# Patient Record
Sex: MALE | Race: WHITE | NOT HISPANIC OR LATINO | Employment: OTHER | URBAN - METROPOLITAN AREA
[De-identification: names, ages, dates, MRNs, and addresses within clinical notes are randomized per-mention and may not be internally consistent; named-entity substitution may affect disease eponyms.]

---

## 2017-01-19 ENCOUNTER — LAB REQUISITION (OUTPATIENT)
Dept: LAB | Facility: HOSPITAL | Age: 72
End: 2017-01-19
Payer: MEDICARE

## 2017-01-19 ENCOUNTER — ALLSCRIPTS OFFICE VISIT (OUTPATIENT)
Dept: OTHER | Facility: OTHER | Age: 72
End: 2017-01-19

## 2017-01-19 DIAGNOSIS — L98.9 DISORDER OF SKIN OR SUBCUTANEOUS TISSUE: ICD-10-CM

## 2017-01-19 PROCEDURE — 88305 TISSUE EXAM BY PATHOLOGIST: CPT | Performed by: DERMATOLOGY

## 2017-01-30 ENCOUNTER — GENERIC CONVERSION - ENCOUNTER (OUTPATIENT)
Dept: OTHER | Facility: OTHER | Age: 72
End: 2017-01-30

## 2017-02-20 ENCOUNTER — ALLSCRIPTS OFFICE VISIT (OUTPATIENT)
Dept: OTHER | Facility: OTHER | Age: 72
End: 2017-02-20

## 2017-03-21 ENCOUNTER — ALLSCRIPTS OFFICE VISIT (OUTPATIENT)
Dept: OTHER | Facility: OTHER | Age: 72
End: 2017-03-21

## 2017-07-27 ENCOUNTER — ALLSCRIPTS OFFICE VISIT (OUTPATIENT)
Dept: OTHER | Facility: OTHER | Age: 72
End: 2017-07-27

## 2017-07-27 PROCEDURE — 88305 TISSUE EXAM BY PATHOLOGIST: CPT | Performed by: DERMATOLOGY

## 2017-07-28 ENCOUNTER — LAB REQUISITION (OUTPATIENT)
Dept: LAB | Facility: HOSPITAL | Age: 72
End: 2017-07-28
Payer: MEDICARE

## 2017-07-28 DIAGNOSIS — L98.9 DISORDER OF SKIN OR SUBCUTANEOUS TISSUE: ICD-10-CM

## 2017-08-11 ENCOUNTER — GENERIC CONVERSION - ENCOUNTER (OUTPATIENT)
Dept: OTHER | Facility: OTHER | Age: 72
End: 2017-08-11

## 2017-10-04 ENCOUNTER — LAB REQUISITION (OUTPATIENT)
Dept: LAB | Facility: HOSPITAL | Age: 72
End: 2017-10-04
Payer: MEDICARE

## 2017-10-04 DIAGNOSIS — C44.619 BASAL CELL CARCINOMA OF SKIN OF LEFT UPPER LIMB, INCLUDING SHOULDER: ICD-10-CM

## 2017-10-04 PROCEDURE — 88305 TISSUE EXAM BY PATHOLOGIST: CPT | Performed by: DERMATOLOGY

## 2017-10-13 ENCOUNTER — GENERIC CONVERSION - ENCOUNTER (OUTPATIENT)
Dept: OTHER | Facility: OTHER | Age: 72
End: 2017-10-13

## 2017-10-16 PROCEDURE — 88305 TISSUE EXAM BY PATHOLOGIST: CPT | Performed by: DERMATOLOGY

## 2017-10-17 ENCOUNTER — LAB REQUISITION (OUTPATIENT)
Dept: LAB | Facility: HOSPITAL | Age: 72
End: 2017-10-17
Payer: MEDICARE

## 2017-10-17 DIAGNOSIS — L98.9 DISORDER OF SKIN OR SUBCUTANEOUS TISSUE: ICD-10-CM

## 2017-10-25 ENCOUNTER — GENERIC CONVERSION - ENCOUNTER (OUTPATIENT)
Dept: OTHER | Facility: OTHER | Age: 72
End: 2017-10-25

## 2017-11-14 ENCOUNTER — LAB REQUISITION (OUTPATIENT)
Dept: LAB | Facility: HOSPITAL | Age: 72
End: 2017-11-14
Payer: MEDICARE

## 2017-11-14 DIAGNOSIS — C44.629 SQUAMOUS CELL CARCINOMA OF SKIN OF LEFT UPPER LIMB, INCLUDING SHOULDER: ICD-10-CM

## 2017-11-14 PROCEDURE — 88305 TISSUE EXAM BY PATHOLOGIST: CPT | Performed by: DERMATOLOGY

## 2017-11-21 ENCOUNTER — GENERIC CONVERSION - ENCOUNTER (OUTPATIENT)
Dept: OTHER | Facility: OTHER | Age: 72
End: 2017-11-21

## 2017-11-28 ENCOUNTER — LAB REQUISITION (OUTPATIENT)
Dept: LAB | Facility: HOSPITAL | Age: 72
End: 2017-11-28
Payer: MEDICARE

## 2017-11-28 DIAGNOSIS — L98.9 DISORDER OF SKIN OR SUBCUTANEOUS TISSUE: ICD-10-CM

## 2017-11-28 PROCEDURE — 88305 TISSUE EXAM BY PATHOLOGIST: CPT | Performed by: DERMATOLOGY

## 2017-12-05 ENCOUNTER — GENERIC CONVERSION - ENCOUNTER (OUTPATIENT)
Dept: OTHER | Facility: OTHER | Age: 72
End: 2017-12-05

## 2018-01-12 NOTE — RESULT NOTES
Message   appt made     Verified Results  (1) TISSUE EXAM [id number] 03:37PM Vinita Mitchell Order Number: [id number]     Test Name Result Flag Reference   LAB AP CASE REPORT (Report)     Surgical Pathology Report             Case: [id number]                   Authorizing Provider: Zachary Shaffer MD     Collected:      01/19/2017 1537        Pathologist:      Jerry Stuart MD      Received:      01/23/2017 1152        Specimens:  A) - Skin, Other, Upper back                                      B) - Skin, Other, Lower back   LAB AP FINAL DIAGNOSIS (Report)     A  Skin, Upper back, shave biopsy:  - At least hypertrophic actinic keratosis with focal proliferative and   acantholytic features  See Note  -- Entire base of lesion not visualized; underlying malignancy cannot be   excluded  B  Skin, Lower back, shave biopsy:  - Basal cell carcinoma, nodular type, extending to the peripheral border   and base of biopsy  Interpretation performed at [place] [address]  Electronically signed by Jerry Stuart MD on 1/26/2017 at 11:40 AM   LAB AP NOTE (Report)     Seen only on the initial level in specimen A, there are focal   proliferative features incompletely visualized at the base of the biopsy  Early superficially invasive squamous cell carcinoma arising in a   hypertrophic actinic keratosis cannot be completely excluded  If lesion   remains or recurs, consideration for excision to ensure complete removal   of the lesion is recommended  LAB AP SURGICAL ADDITIONAL INFORMATION (Report)     These tests were developed and their performance characteristics   determined by [facility] or [employer]  They may not be cleared or approved by the [place] S  Food and   Drug Administration  The FDA has determined that such clearance or   approval is not necessary  These tests are used for clinical purposes     They should not be regarded as investigational or for research  This   laboratory has been approved by [place] [id number], designated as a high-complexity   laboratory and is qualified to perform these tests  LAB AP GROSS DESCRIPTION (Report)     A  The specimen is received in formalin, labeled with the patient's name   and hospital number, and is designated skin shave upper back  The   specimen consists of a tan superficial shave of skin measuring 0 7 x 0 4 x   0 1 cm  The skin surface exhibits a tan-yellow keratotic papule measuring   0 6 x 0 3 x 0 2 cm  The margin is inked blue  The skin surface is inked   red  The specimen is bisected lengthwise  Entirely submitted  One   cassette  B  The specimen is received in formalin, labeled with the patient's name   and hospital number, and is designated skin shave of lower back  The   specimen consists of a tan superficial shave of skin measuring 0 8 x 0 6 x   0 1 cm  The skin surface appears keratotic  The margin is inked blue  The   skin surface is inked red  The specimen is bisected lengthwise  Entirely   submitted  One cassette  Note: The estimated total formalin fixation time based upon information   provided by the submitting clinician and the standard processing schedule   is over 72 hours  MAC   LAB AP CLINICAL INFORMATION      TW Order Number: [id number]  A   R/O SCC  B  R/O BCC

## 2018-01-13 NOTE — RESULT NOTES
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,    extending to deep reticular dermis (2 mm deep, Zachary's level IV)  - No perineural or lymph-vascular invasion identified   - Scar and changes consistent with prior biopsy site  - Resection margins (peripheral and deep) negative for malignancy  - 7th Ed  AJCC Stage- at least Stage I- pT1, pNX    - Adjacent/associated hypertrophic actinic keratosis  Interpretation performed at [facility], [address] [place] [address]  Electronically signed by Kd Ang MD on 11/20/2017 at 8:24 PM   LAB AP SURGICAL ADDITIONAL INFORMATION (Report)     All controls performed with the immunohistochemical stains reported above   reacted appropriately  These tests were developed and their performance   characteristics determined by [address] [place] [address] or   [facility]  They may not be cleared or approved by the U S  Food and Drug Administration  The FDA has determined that such clearance   or approval is not necessary  These tests are used for clinical purposes  They should not be regarded as investigational or for research  This   laboratory has been approved by [place] [id number], designated as a high-complexity   laboratory and is qualified to perform these tests  LAB AP GROSS DESCRIPTION (Report)     A   The specimen is received in formalin, labeled with the patient's name   and hospital number, and is designated left elbow, is an unoriented   elliptical resection of skin with a minimal amount of attached fat   measuring 2 8 x 1 3 cm and is excised to a depth of 0 2 cm  The epidermal   surface is tan, wrinkled, and contains a 0 9 x 0 6 cm tan-white, firm, and   ill-defined nodule that is 0 3 cm away from the nearest resection margin   which is inked green  The specimen is serially sectioned parallel to the   short axis revealing tan cut surfaces  The specimen is entirely submitted   with the tips in cassette A1 and the remaining tissue in cassettes A2-A5  A3-A4 contain lesion  Each cassette contains two pieces  Note: The estimated total formalin fixation time based upon information   provided by the submitting clinician and the standard processing schedule   is less than 72 hours   Layla CORLEY AP CLINICAL INFORMATION      TW Order Number: [id number]  [facility] check margins

## 2018-01-16 NOTE — RESULT NOTES
Verified Results  (1) TISSUE EXAM [id number] 11:54AM Mame Knight Order Number: [id number]     Test Name Result Flag Reference   LAB AP CASE REPORT (Report)     Surgical Pathology Report             Case: [id number]                   Authorizing Provider: Javier Madden MD     Collected:      10/16/2017           Pathologist:      Tomás Mahan MD      Received:      10/18/2017 1313        Specimens:  A) - Skin, Other, Left elbow                                      B) - Skin, Other, Right forearm   LAB AP FINAL DIAGNOSIS (Report)     A  Skin, Left elbow, shave biopsy:  - Invasive squamous cell carcinoma, well to moderately differentiated,   present at    the peripheral border and base of biopsy  B  Skin, Right forearm, shave biopsy:  - 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 and   foci    suspicious for invasion, extending to base of biopsy  -- Entire base of lesion not visualized  Interpretation performed at [place] [address]  Electronically signed by Tomás Mahan MD on 10/20/2017 at 11:54 AM   LAB AP SURGICAL ADDITIONAL INFORMATION (Report)     All controls performed with the immunohistochemical stains reported above   reacted appropriately  These tests were developed and their performance   characteristics determined by [facility] or   [facility] [employer]  They may not be cleared or approved by the [place] S  Food and Drug Administration  The FDA has determined that such clearance   or approval is not necessary  These tests are used for clinical purposes  They should not be regarded as investigational or for research  This   laboratory has been approved by [place] [id number], designated as a high-complexity   laboratory and is qualified to perform these tests  LAB AP GROSS DESCRIPTION (Report)     A   The specimen is received in formalin, labeled with the patient's name   and hospital number, and is designated skin shave left elbow  The   specimen consists of a tan superficial shave of skin measuring 0 8 x 0 6 x   0 1 cm  The skin surface is covered by tan to pale tan keratotic   tissue/material averaging 2 mm in thickness  The margin of resection is   inked green  The skin surface is inked red  The specimen is bisected   lengthwise  Entirely submitted  One cassette  B  The specimen is received in formalin, labeled with the patient's name   and hospital number, and is designated skin shave right forearm  The   specimen consists of a tan superficial shave of skin measuring 0 8 x 0 6 x   0 1 cm  The skin surface exhibits a tan keratotic papule measuring 0 5 x   0 3 x 0 2 cm  The margin of resection is inked green  The skin surface is   inked red  The specimen is bisected lengthwise  Entirely submitted  One   cassette  Note: The estimated total formalin fixation time based upon information   provided by the submitting clinician and the standard processing schedule   is less than 72 hours   MAC   LAB AP CLINICAL INFORMATION      TW Order Number: [id number]  SCC suspected R/O

## 2018-01-23 NOTE — RESULT NOTES
Verified Results  (1) TISSUE EXAM [id number] 03:48PM Juana Martines Order Number: [id number]     Test Name Result Flag Reference   LAB AP CASE REPORT (Report)     Surgical Pathology Report             Case: [id number]                   Authorizing Provider: Keith Hatch MD     Collected:      11/28/2017 1548        Pathologist:      Glen Campos MD      Received:      11/30/2017 0935        Specimen:  Skin, Other, Left cheek   LAB AP FINAL DIAGNOSIS (Report)     A  Skin, Left cheek, shave biopsy:  - Predominant hypertrophic and proliferative actinic keratosis with   follicular extension    and few detached atypical squamous nests at the base of the biopsy,   cannot exclude   early/superficially invasive squamous cell carcinoma  Interpretation performed at [facility], [address] [place] [address]   [id number]  Electronically signed by Glen Campos MD on 12/5/2017 at 9:35 AM   LAB AP NOTE      Excision to ensure complete removal of the lesion is recommended  LAB AP SURGICAL ADDITIONAL INFORMATION (Report)     All controls performed with the immunohistochemical stains reported above   reacted appropriately  These tests were developed and their performance   characteristics determined by [facility] or   [address] [place] [address]  They may not be cleared or approved by the [place]  Food and Drug Administration  The FDA has determined that such clearance   or approval is not necessary  These tests are used for clinical purposes  They should not be regarded as investigational or for research  This   laboratory has been approved by [place] [id number], designated as a high-complexity   laboratory and is qualified to perform these tests  LAB AP GROSS DESCRIPTION (Report)     A  The specimen is received in formalin, labeled with the patient's name   and hospital number, and is designated left cheek  The specimen consists   of a white skin shave measuring 0 7 x 0 4 by less than 0 1 cm  The   epithelial surface exhibits a white papule measuring 0 1 x 0 1 cm which   abuts the nearest peripheral margin  Bisected and entirely submitted in   one cassette  Note: The estimated total formalin fixation time based upon information   provided by the submitting clinician and the standard processing schedule   is over 72 hours   [place]   LAB AP CLINICAL INFORMATION      TW Order Number: [id number]  R/O SCC

## 2018-03-14 ENCOUNTER — OFFICE VISIT (OUTPATIENT)
Dept: DERMATOLOGY | Facility: CLINIC | Age: 73
End: 2018-03-14
Payer: MEDICARE

## 2018-03-14 DIAGNOSIS — L57.0 ACTINIC KERATOSIS: Primary | ICD-10-CM

## 2018-03-14 DIAGNOSIS — Z85.828 HISTORY OF SKIN CANCER: ICD-10-CM

## 2018-03-14 DIAGNOSIS — L82.1 SEBORRHEIC KERATOSIS: ICD-10-CM

## 2018-03-14 DIAGNOSIS — Z13.89 SCREENING FOR SKIN CONDITION: ICD-10-CM

## 2018-03-14 DIAGNOSIS — L98.9 CHANGING SKIN LESION: ICD-10-CM

## 2018-03-14 PROCEDURE — 99213 OFFICE O/P EST LOW 20 MIN: CPT | Performed by: DERMATOLOGY

## 2018-03-14 PROCEDURE — 17003 DESTRUCT PREMALG LES 2-14: CPT | Performed by: DERMATOLOGY

## 2018-03-14 PROCEDURE — 88305 TISSUE EXAM BY PATHOLOGIST: CPT | Performed by: PATHOLOGY

## 2018-03-14 PROCEDURE — 17000 DESTRUCT PREMALG LESION: CPT | Performed by: DERMATOLOGY

## 2018-03-14 PROCEDURE — 11100 PR BIOPSY OF SKIN LESION: CPT | Performed by: DERMATOLOGY

## 2018-03-14 NOTE — PATIENT INSTRUCTIONS
Skin lesion probable recurrent basal cell carcinoma will plan on complete excision if positive  Actinic Keratosis:  Patient advised lesions are precancers  These should resolve with cryosurgery if not to let us know sun block recommended  Seborrheic keratosis patient reassured these are normal growths we acquire with age no treatment needed  History of skin cancer in no recurrence nothing else atypical sunblock recommended follow-up in 6 months  Screening for dermatologic disorders nothing else of concern noted on complete exam follow-up in 6 months  Wound care instructions given to patient  Treatment with Cryotherapy    The doctor has treated your skin with nitrogen, which is 320 degrees Fahrenheit below zero  He has given the treated area "frostbite "    Stinging should subside within a few hours  You can take Tylenol for pain, if needed  Over the next few days, it is normal if the area becomes reddened, a blood blister, or swollen with fluid  If the lesion treated was near the eye - you could get a swollen eye over the next few days  Do not panic! This is all temporary, and will resolve with time  There is no special treatment - just keep the area clean  Makeup and BandAids can be used, if preferred  When the area starts to dry up and peel off, using Vaseline can help healing  It usually takes up to a month for it to heal   Some lesions are recurrent and may require repeat treatments  If a lesion has not healed in one month, please don't hesitate to contact us  If you have any further questions that are not answered here, please call us  [phone]    Thank you for allowing us to care for you

## 2018-03-14 NOTE — PROGRESS NOTES
[address] [facility] DERMATOLOGY  [address] [place] [address]     MRN: [id number] : 1945  Encounter: [id number]  Patient Information: Felisa Adams  Chief complaint:  Skin cancer recheck as well as recheck of actinic keratosis biopsied in November    History of present illness:  26-year-old male with history of numerous skin cancers who had not seen since November  Patient had a lesion biopsied on the left cheek that showed an actinic keratosis was not able to come in because of the weather  Patient due for overall checkup as well  Past Medical History:   Diagnosis Date    Asthma      Past Surgical History:   Procedure Laterality Date    CATARACT EXTRACTION      CO REPAIR ING HERNIA,5+Y/O,REDUCIBL Left 2016    Procedure: INGUINAL HERNIA REPAIR ;  Surgeon: Jason Diaz MD;  Location: [facility];  Service: General     Social History   History   Alcohol Use No     History   Drug Use No     History   Smoking Status    Former Smoker   Smokeless Tobacco    Former User     No family history on file  Meds/Allergies   No Known Allergies    Meds:  Prior to Admission medications    Medication Sig Start Date End Date Taking? Authorizing Provider   ezetimibe (ZETIA) 10 mg tablet Take 10 mg by mouth daily  Yes Historical Provider, MD   fluticasone-salmeterol (ADVAIR) 250-50 mcg/dose inhaler Inhale 1 puff every 12 (twelve) hours  Yes Historical Provider, MD   rosuvastatin (CRESTOR) 10 MG tablet Take 10 mg by mouth daily     Yes Historical Provider, MD       Subjective:     Review of Systems:    General: negative for - chills, fatigue, fever,  weight gain or weight loss  Psychological: negative for - anxiety, behavioral disorder, concentration difficulties, decreased libido, depression, irritability, memory difficulties, mood swings, sleep disturbances or suicidal ideation  ENT: negative for - hearing difficulties , nasal congestion, nasal discharge, oral lesions, sinus pain, sneezing, sore throat  Allergy and Immunology: negative for - hives, insect bite sensitivity,  Hematological and Lymphatic: negative for - bleeding problems, blood clots,bruising, swollen lymph nodes  Endocrine: negative for - hair pattern changes, hot flashes, malaise/lethargy, mood swings, palpitations, polydipsia/polyuria, skin changes, temperature intolerance or unexpected weight change  Respiratory: negative for - cough, hemoptysis, orthopnea, shortness of breath, or wheezing  Cardiovascular: negative for - chest pain, dyspnea on exertion, edema,  Gastrointestinal: negative for - abdominal pain, nausea/vomiting  Genito-Urinary: negative for - dysuria, incontinence, irregular/heavy menses or urinary frequency/urgency  Musculoskeletal: negative for - gait disturbance, joint pain, joint stiffness, joint swelling, muscle pain, muscular weakness  Dermatological:  As in HPI  Neurological: negative for confusion, dizziness, headaches, impaired coordination/balance, memory loss, numbness/tingling, seizures, speech problems, tremors or weakness       Objective: There were no vitals taken for this visit      Physical Exam:    General Appearance:    Alert, cooperative, no distress   Head:    Normocephalic, without obvious abnormality, atraumatic           Skin:   A full skin exam was performed including scalp, head scalp, eyes, ears, nose, lips, neck, chest, axilla, abdomen, back, buttocks, bilateral upper extremities, bilateral lower extremities, hands, feet, fingers, toes, fingernails, and toenails 7 mm indurated keratotic pearly nodule noted on the mid back possibly at the site of the scar of the previous curettage scaling erythematous areas noted below normal keratotic papules with greasy stuck on appearance other lesions we had previously excised appear without recurrence nothing else atypical noted except overall dry skin     Physical Exam   Constitutional:       HENT:   Head: Shave Biopsy Procedure Note    Pre-operative Diagnosis: basal cell cancer    Plan:  1  Instructed to keep the wound dry and covered for 24 and clean thereafter  2  Warning signs of infection were reviewed  3  Recommended that the patient use OTC acetaminophen as needed for pain  4  Return  Pending results of biopsy(ies)    Locations:mid back    Indications: basal cell cancer    Anesthesia: Lidocaine 1% without epinephrine without added sodium bicarbonate    Procedure Details     Patient informed of the risks (including bleeding and infection) and benefits of the   procedure and Verbal informed consent obtained  The lesion and surrounding area were given a sterile prep using alcohol and draped in the usual sterile fashion  A Blue blade razor was used to obtain a specimen  Hemostasis achieved with aluminum chloride  Petrolatum and a sterile dressing applied  The specimen was sent for pathologic examination  The patient tolerated the procedure(s) well  Complications:  none  Cryotherapy Procedure Note    Pre-operative Diagnosis: actinic keratosis    Plan:  1  Instructed to keep the area dry and clean thereafter  Apply petrolatum if area gets crusty  2  Recommended that the patient use acetaminophen  as needed for pain  Locations: face arms    Indications: Destruction of actinic keratosis x 9    Patient informed of risks (permanent scarring, infection, light or dark discoloration, bleeding, infection, weakness, numbness and recurrence of the lesion) and benefits of the procedure and verbal informed consent obtained  The areas are treated with liquid nitrogen therapy, frozen until ice ball extended 2 mm beyond lesion, allowed to thaw, and treated again  The patient tolerated procedure well  The patient was instructed on post-op care, warned that there may be blister formation, redness and pain   Recommend OTC analgesia as needed for pain     Condition:  Stable    Complications:  none  Assessment:     1  Actinic keratosis     2  Seborrheic keratosis     3  Changing skin lesion     4  Screening for skin condition     5  History of skin cancer           Plan:    Skin lesion probable recurrent basal cell carcinoma will plan on complete excision if positive  Actinic Keratosis:  Patient advised lesions are precancers  These should resolve with cryosurgery if not to let us know sun block recommended  Seborrheic keratosis patient reassured these are normal growths we acquire with age no treatment needed  History of skin cancer in no recurrence nothing else atypical sunblock recommended follow-up in 6 months  Screening for dermatologic disorders nothing else of concern noted on complete exam follow-up in 6 months    Katelin Dominguez MD  3/14/2018,1:02 PM    Portions of the record may have been created with voice recognition software   Occasional wrong word or "sound a like" substitutions may have occurred due to the inherent limitations of voice recognition software   Read the chart carefully and recognize, using context, where substitutions have occurred

## 2018-04-09 ENCOUNTER — PROCEDURE VISIT (OUTPATIENT)
Dept: DERMATOLOGY | Facility: CLINIC | Age: 73
End: 2018-04-09
Payer: MEDICARE

## 2018-04-09 DIAGNOSIS — C44.529 SQUAMOUS CELL CARCINOMA OF BACK: Primary | ICD-10-CM

## 2018-04-09 PROCEDURE — 11602 EXC TR-EXT MAL+MARG 1.1-2 CM: CPT | Performed by: DERMATOLOGY

## 2018-04-09 PROCEDURE — 88305 TISSUE EXAM BY PATHOLOGIST: CPT | Performed by: PATHOLOGY

## 2018-04-09 PROCEDURE — 12032 INTMD RPR S/A/T/EXT 2.6-7.5: CPT | Performed by: DERMATOLOGY

## 2018-04-09 NOTE — PROGRESS NOTES
[address]ERMATOLOGY  [address]  [address] [place] [address]     MRN: [id number] : 1945  Encounter: [id number]  Patient Information: Mary Shafer    Subjective:     59-year-old male presents for excision of previously biopsied invasive squamous cell carcinoma mid     Objective: There were no vitals taken for this visit  Physical Exam:    General Appearance:    Alert, cooperative, no distress   Skin:    Previous area of biopsy noted     Procedure name: Excision with intermediate layered closure        Location:  Mid back  Diagnosis: Squamous cell carcinoma    Size of lesion: 7  mm    Margins:4  mm    Size + Margins: 15  mm    I explained the diagnosis to the patient and we recommend an excision of the lesion for diagnosis and/or treatment  Potential complications include, but are not limited to: scarring, bleeding, infection, incomplete removal, recurrence, and nerve damage  The risks, benefits, and alternatives were discussed with the patient in detail  The location of the tumor was identified, circled, and confirmed by the patient  The correct patient, site, and procedure were confirmed  Anesthesia: 1% xylocaine with 1:100,000 epinephrine 6 cc  The patient was brought into the room, prepped and draped sterilely in the usual manner and anesthesia was administered by local infiltration  A fusiform shape was drawn around the lesion, and the margins were incised to the level of the subcutaneous fat with a number 15cc Bard-Gwyn blade  The tissue was removed with sharp and blunt dissection  The lateral margins of the resulting defect were undermined with sharp and blunt dissection  Hemostasis was achieved with electrocautery  The deeper layers of the defect, including subcutaneous fat and fascia, had to be approximated to reduce tension on the suture line  Layered wound closure was performed    The wound was cleaned with saline, dried off, petroleum applied, and the wound was covered with a pressure dressing  The patient was given detailed verbal and written instructions on postoperative care  Suture for adipose/fascial/dermal layer closure: 4-0  vicryl 3 sutures interrupted    Suture used to approximate epidermis: 4-0  nylon  7sutures interrupted    Final wound length: 3 8 cm    Follow-up in: 2  weeks  Patient tolerated procedure well without complications  Assessment:     1  Squamous cell carcinoma of back           Plan:   Wound care instructions given to patient        Prior to Admission medications    Medication Sig Start Date End Date Taking? Authorizing Provider   ezetimibe (ZETIA) 10 mg tablet Take 10 mg by mouth daily  Historical Provider, MD   fluticasone-salmeterol (ADVAIR) 250-50 mcg/dose inhaler Inhale 1 puff every 12 (twelve) hours  Historical Provider, MD   rosuvastatin (CRESTOR) 10 MG tablet Take 10 mg by mouth daily  Historical Provider, MD     No Known Allergies    Cristy Bateman MD  4/9/2018,2:29 PM    Portions of the record may have been created with voice recognition software   Occasional wrong word or "sound a like" substitutions may have occurred due to the inherent limitations of voice recognition software   Read the chart carefully and recognize, using context, where substitutions have occurred

## 2018-04-23 ENCOUNTER — CLINICAL SUPPORT (OUTPATIENT)
Dept: DERMATOLOGY | Facility: CLINIC | Age: 73
End: 2018-04-23

## 2018-04-23 DIAGNOSIS — C44.529 SQUAMOUS CELL CARCINOMA OF BACK: Primary | ICD-10-CM

## 2018-04-23 PROCEDURE — 99024 POSTOP FOLLOW-UP VISIT: CPT | Performed by: DERMATOLOGY

## 2018-04-23 NOTE — PROGRESS NOTES
[address] [facility] [address] [facility] DERMATOLOGY  [address] [address]     MRN: [id number] : 1945  Encounter: [id number]  Patient Information: Ariana Marcelino    Subjective:     68-year-old male presents for follow-up for previous excised basal cell carcinoma mid back  Objective: There were no vitals taken for this visit  Physical Exam:    General Appearance:    Alert, cooperative, no distress   Skin:    Previous site of excision healing well  Procedure:  Suture removal  Site of excision:  Mid back  Wound was cleansed with saline  Wound is intact  Sutures removed  Steri-Strips applied  Biopsy revealed  Basal cell cancer margins clear     Assessment:     1  Squamous cell carcinoma of back           Plan:    previous site of excision healing well margins clear      Prior to Admission medications    Medication Sig Start Date End Date Taking? Authorizing Provider   ezetimibe (ZETIA) 10 mg tablet Take 10 mg by mouth daily  Historical Provider, MD   fluticasone-salmeterol (ADVAIR) 250-50 mcg/dose inhaler Inhale 1 puff every 12 (twelve) hours  Historical Provider, MD   rosuvastatin (CRESTOR) 10 MG tablet Take 10 mg by mouth daily  Historical Provider, MD     No Known Allergies    Jesusita Salomon MD  ,2:97 PM    Portions of the record may have been created with voice recognition software   Occasional wrong word or "sound a like" substitutions may have occurred due to the inherent limitations of voice recognition software   Read the chart carefully and recognize, using context, where substitutions have occurred

## 2018-04-23 NOTE — PATIENT INSTRUCTIONS
STERI-STRIPS (BUTTERFLY) POST SURGERY        Steri-Strips have been placed over your incision site to give added support  Please continue to bathe the area as usual     Eventually the Steri-Strips will begin to peel off on the ends  Snip the raised ends off with scissors and let the rest fall off on their own  If you have any questions, please call our office   [phone]

## 2018-08-14 ENCOUNTER — OFFICE VISIT (OUTPATIENT)
Dept: DERMATOLOGY | Facility: CLINIC | Age: 73
End: 2018-08-14
Payer: MEDICARE

## 2018-08-14 DIAGNOSIS — Z13.89 SCREENING FOR SKIN CONDITION: ICD-10-CM

## 2018-08-14 DIAGNOSIS — L82.1 VERRUCOUS KERATOSIS: Primary | ICD-10-CM

## 2018-08-14 DIAGNOSIS — L23.7 POISON IVY: ICD-10-CM

## 2018-08-14 DIAGNOSIS — Z85.828 HISTORY OF SKIN CANCER: ICD-10-CM

## 2018-08-14 DIAGNOSIS — L82.1 SEBORRHEIC KERATOSIS: ICD-10-CM

## 2018-08-14 PROCEDURE — 17110 DESTRUCTION B9 LES UP TO 14: CPT | Performed by: DERMATOLOGY

## 2018-08-14 PROCEDURE — 99213 OFFICE O/P EST LOW 20 MIN: CPT | Performed by: DERMATOLOGY

## 2018-08-14 RX ORDER — LANOLIN ALCOHOL/MO/W.PET/CERES
CREAM (GRAM) TOPICAL DAILY
COMMUNITY

## 2018-08-14 RX ORDER — MULTIVITAMIN
1 CAPSULE ORAL DAILY
COMMUNITY

## 2018-08-14 NOTE — PROGRESS NOTES
[address]  [address]  [place]  [phone]  [phone]     MRN: [id number] : 1945  Encounter: [id number]  Patient Information: Seth Mensah  Chief complaint:3 month checkup    History of present illness:  65-year-old male presents for overall skin check previous history numerous skin cancers complains of numerous growths on his skin also concerned regarding a rash that developed after doing yard work  Past Medical History:   Diagnosis Date    Asthma      Past Surgical History:   Procedure Laterality Date    CATARACT EXTRACTION      TN REPAIR ING HERNIA,5+Y/O,REDUCIBL Left 2016    Procedure: INGUINAL HERNIA REPAIR ;  Surgeon: Alexsandra Rendon MD;  [facility];  Service: General     Social History   History   Alcohol Use No     History   Drug Use No     History   Smoking Status    Former Smoker   Smokeless Tobacco    Former User     History reviewed  No pertinent family history  Meds/Allergies   No Known Allergies    Meds:  Prior to Admission medications    Medication Sig Start Date End Date Taking? Authorizing Provider   cyanocobalamin (VITAMIN B-12) 1,000 mcg tablet Take by mouth daily   Yes Historical Provider, MD   ezetimibe (ZETIA) 10 mg tablet Take 10 mg by mouth daily  Yes Historical Provider, MD   fluticasone-salmeterol (ADVAIR) 250-50 mcg/dose inhaler Inhale 1 puff every 12 (twelve) hours  Yes Historical Provider, MD   Multiple Vitamin (MULTIVITAMIN) capsule Take 1 capsule by mouth daily   Yes Historical Provider, MD   rosuvastatin (CRESTOR) 10 MG tablet Take 10 mg by mouth daily     Yes Historical Provider, MD       Subjective:     Review of Systems:    General: negative for - chills, fatigue, fever,  weight gain or weight loss  Psychological: negative for - anxiety, behavioral disorder, concentration difficulties, decreased libido, depression, irritability, memory difficulties, mood swings, sleep disturbances or suicidal ideation  ENT: negative for - hearing difficulties , nasal congestion, nasal discharge, oral lesions, sinus pain, sneezing, sore throat  Allergy and Immunology: negative for - hives, insect bite sensitivity,  Hematological and Lymphatic: negative for - bleeding problems, blood clots,bruising, swollen lymph nodes  Endocrine: negative for - hair pattern changes, hot flashes, malaise/lethargy, mood swings, palpitations, polydipsia/polyuria, skin changes, temperature intolerance or unexpected weight change  Respiratory: negative for - cough, hemoptysis, orthopnea, shortness of breath, or wheezing  Cardiovascular: negative for - chest pain, dyspnea on exertion, edema,  Gastrointestinal: negative for - abdominal pain, nausea/vomiting  Genito-Urinary: negative for - dysuria, incontinence, irregular/heavy menses or urinary frequency/urgency  Musculoskeletal: negative for - gait disturbance, joint pain, joint stiffness, joint swelling, muscle pain, muscular weakness  Dermatological:  As in HPI  Neurological: negative for confusion, dizziness, headaches, impaired coordination/balance, memory loss, numbness/tingling, seizures, speech problems, tremors or weakness       Objective: There were no vitals taken for this visit      Physical Exam:    General Appearance:    Alert, cooperative, no distress   Head:    Normocephalic, without obvious abnormality, atraumatic           Skin:   A full skin exam was performed including scalp, head scalp, eyes, ears, nose, lips, neck, chest, axilla, abdomen, back, buttocks, bilateral upper extremities, bilateral lower extremities, hands, feet, fingers, toes, fingernails, and toenails  Verrucous keratotic papules noted on the dorsum of his hands upper arms and right shin no mole keratotic papules with greasy stuck on appearance previous sites of skin cancer well healed without recurrence erythema scaling well-demarcated patches noted on the right arm streaking Cryotherapy Procedure Note    Pre-operative Diagnosis:  Verrucous keratosis    Plan:  1  Instructed to keep the area dry and clean thereafter  Apply petrolatum if area gets crusty  2  Recommended that the patient use acetaminophen  as needed for pain  Locations:  Arms dorsum of the  Hands and right shin    Indications: Destruction of  Verrucous keratosis x6    Patient informed of risks (permanent scarring, infection, light or dark discoloration, bleeding, infection, weakness, numbness and recurrence of the lesion) and benefits of the procedure and verbal informed consent obtained  The areas are treated with liquid nitrogen therapy, frozen until ice ball extended 2 mm beyond lesion, allowed to thaw, and treated again  The patient tolerated procedure well  The patient was instructed on post-op care, warned that there may be blister formation, redness and pain  Recommend OTC analgesia as needed for pain  Condition:  Stable    Complications:  none  Assessment:     1  Verrucous keratosis     2  Screening for skin condition     3  History of skin cancer     4  Poison ivy     5   Seborrheic keratosis           Plan:    verrucous keratosis lesion treated because the patient concern and irritation   poison ivy will go ahead treat with topical steroids follow-up if any worsening noted  Seborrheic keratosis patient reassured these are normal growths we acquire with age no treatment needed  History of skin cancer in no recurrence nothing else atypical sunblock recommended follow-up in 3 months  Screening for dermatologic disorders nothing else of concern noted on complete exam follow-up in 3 months    Sumaya Britt MD  8/14/2018,2:20 PM    Portions of the record may have been created with voice recognition software   Occasional wrong word or "sound a like" substitutions may have occurred due to the inherent limitations of voice recognition software   Read the chart carefully and recognize, using context, where substitutions have occurred

## 2018-08-14 NOTE — PATIENT INSTRUCTIONS
verrucous keratosis lesion treated because the patient concern and irritation   poison ivy will go ahead treat with topical steroids follow-up if any worsening noted  Seborrheic keratosis patient reassured these are normal growths we acquire with age no treatment needed  History of skin cancer in no recurrence nothing else atypical sunblock recommended follow-up in 3 months  Screening for dermatologic disorders nothing else of concern noted on complete exam follow-up in 3 months  Treatment with Cryotherapy    The doctor has treated your skin with nitrogen, which is 320 degrees Fahrenheit below zero  He has given the treated area "frostbite "    Stinging should subside within a few hours  You can take Tylenol for pain, if needed  Over the next few days, it is normal if the area becomes reddened, a blood blister, or swollen with fluid  If the lesion treated was near the eye - you could get a swollen eye over the next few days  Do not panic! This is all temporary, and will resolve with time  There is no special treatment - just keep the area clean  Makeup and BandAids can be used, if preferred  When the area starts to dry up and peel off, using Vaseline can help healing  It usually takes up to a month for it to heal   Some lesions are recurrent and may require repeat treatments  If a lesion has not healed in one month, please don't hesitate to contact us  If you have any further questions that are not answered here, please call us  [phone]    Thank you for allowing us to care for you

## 2019-05-17 ENCOUNTER — OFFICE VISIT (OUTPATIENT)
Dept: DERMATOLOGY | Facility: CLINIC | Age: 74
End: 2019-05-17
Payer: MEDICARE

## 2019-05-17 DIAGNOSIS — Z13.89 SCREENING FOR SKIN CONDITION: ICD-10-CM

## 2019-05-17 DIAGNOSIS — L98.9 UNKNOWN SKIN LESION: ICD-10-CM

## 2019-05-17 DIAGNOSIS — L82.1 SEBORRHEIC KERATOSIS: Primary | ICD-10-CM

## 2019-05-17 DIAGNOSIS — Z85.828 HISTORY OF SKIN CANCER: ICD-10-CM

## 2019-05-17 PROCEDURE — 88305 TISSUE EXAM BY PATHOLOGIST: CPT | Performed by: PATHOLOGY

## 2019-05-17 PROCEDURE — 99213 OFFICE O/P EST LOW 20 MIN: CPT | Performed by: DERMATOLOGY

## 2019-05-17 PROCEDURE — 11102 TANGNTL BX SKIN SINGLE LES: CPT | Performed by: DERMATOLOGY

## 2019-05-17 PROCEDURE — 11103 TANGNTL BX SKIN EA SEP/ADDL: CPT | Performed by: DERMATOLOGY

## 2019-05-22 ENCOUNTER — TELEPHONE (OUTPATIENT)
Dept: DERMATOLOGY | Facility: CLINIC | Age: 74
End: 2019-05-22

## 2019-06-12 ENCOUNTER — TELEPHONE (OUTPATIENT)
Dept: DERMATOLOGY | Facility: CLINIC | Age: 74
End: 2019-06-12

## 2019-06-19 ENCOUNTER — PROCEDURE VISIT (OUTPATIENT)
Dept: DERMATOLOGY | Facility: CLINIC | Age: 74
End: 2019-06-19
Payer: MEDICARE

## 2019-06-19 DIAGNOSIS — C44.629 SQUAMOUS CELL CANCER OF SKIN OF LEFT FOREARM: Primary | ICD-10-CM

## 2019-06-19 DIAGNOSIS — C44.619 BASAL CELL CARCINOMA OF LEFT FOREARM: ICD-10-CM

## 2019-06-19 PROCEDURE — 11602 EXC TR-EXT MAL+MARG 1.1-2 CM: CPT | Performed by: DERMATOLOGY

## 2019-06-19 PROCEDURE — 12034 INTMD RPR S/TR/EXT 7.6-12.5: CPT | Performed by: DERMATOLOGY

## 2019-06-19 PROCEDURE — 88305 TISSUE EXAM BY PATHOLOGIST: CPT | Performed by: PATHOLOGY

## 2019-06-19 PROCEDURE — 11603 EXC TR-EXT MAL+MARG 2.1-3 CM: CPT | Performed by: DERMATOLOGY

## 2019-06-28 ENCOUNTER — OFFICE VISIT (OUTPATIENT)
Dept: DERMATOLOGY | Facility: CLINIC | Age: 74
End: 2019-06-28

## 2019-06-28 DIAGNOSIS — D04.62 SQUAMOUS CELL CARCINOMA IN SITU (SCCIS) OF SKIN OF LEFT FOREARM: ICD-10-CM

## 2019-06-28 DIAGNOSIS — C44.619 BASAL CELL CARCINOMA (BCC) OF SKIN OF LEFT WRIST: Primary | ICD-10-CM

## 2019-06-28 PROCEDURE — 99024 POSTOP FOLLOW-UP VISIT: CPT | Performed by: DERMATOLOGY

## 2019-07-01 ENCOUNTER — TELEPHONE (OUTPATIENT)
Dept: DERMATOLOGY | Facility: CLINIC | Age: 74
End: 2019-07-01

## 2019-07-02 ENCOUNTER — TELEPHONE (OUTPATIENT)
Dept: DERMATOLOGY | Facility: CLINIC | Age: 74
End: 2019-07-02

## 2019-07-02 ENCOUNTER — PROCEDURE VISIT (OUTPATIENT)
Dept: DERMATOLOGY | Facility: CLINIC | Age: 74
End: 2019-07-02
Payer: MEDICARE

## 2019-07-02 DIAGNOSIS — C44.722 SQUAMOUS CELL CARCINOMA OF RIGHT LOWER LEG: ICD-10-CM

## 2019-07-02 DIAGNOSIS — D04.62 SQUAMOUS CELL CARCINOMA IN SITU (SCCIS) OF SKIN OF LEFT FOREARM: Primary | ICD-10-CM

## 2019-07-02 PROCEDURE — 17260 DSTRJ MAL LES T/A/L 0.5 CM/<: CPT | Performed by: DERMATOLOGY

## 2019-07-02 RX ORDER — SODIUM FLUORIDE 6.1 MG/ML
GEL, DENTIFRICE DENTAL
Refills: 6 | COMMUNITY
Start: 2019-05-15

## 2019-07-02 NOTE — PROGRESS NOTES
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 and minimally invasive squamous cell carcinoma right shin lesions appear non indurated and minimal insert     Objective: There were no vitals taken for this visit  Physical Exam:    General Appearance:    Alert, cooperative, no distress   Skin:   Previous sites of biopsy noted  Procedure: Curettage & Electrodessication of squamous cell carcinoma in situ/squamous cell carcinoma minimally invasive  The reasons for the procedure were explained to the patient  The benefits and risks of the procedure were explained to the patient, including bleeding, infection, incomplete removal, prolonged anesthesia (weeks to months) and rarely nerve damage  The patient is aware that a scar will result from the procedure  The consent for the procedure was obtained verbally and in writing  Lesion Site:  Left forearm Curetted Area Size (mm): 4                       Right shin                                                4  Using a sterile curette, the appropriate area was curetted  The area was curetted and electrodesiccated x3  Final defect size: 5/5 respectively    The wound was left to heal by secondary intention  The wound was cleansed then covered with a dressing  Wound care instructions were verbally given and in writing  I performed the entire procedure  Patient tolerated procedure well  Assessment:     1  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 Authorizing Provider   cyanocobalamin (VITAMIN B-12) 1,000 mcg tablet Take by mouth daily   Yes Historical Provider, MD   ezetimibe (ZETIA) 10 mg tablet Take 10 mg by mouth daily  Yes Historical Provider, MD   fluticasone-salmeterol (ADVAIR) 250-50 mcg/dose inhaler Inhale 1 puff every 12 (twelve) hours  Yes Historical Provider, MD   Multiple Vitamin (MULTIVITAMIN) capsule Take 1 capsule by mouth daily   Yes Historical Provider, MD   PREVIDENT 5000 DRY MOUTH 1 1 % GEL BRUSH IN PLACE OF YOUR REGULAR TOOTHPASTE AT NIGHT  5/15/19  Yes Historical Provider, MD   rosuvastatin (CRESTOR) 10 MG tablet Take 10 mg by mouth daily  Yes Historical Provider, MD   fluocinonide (LIDEX) 0 05 % cream Apply topically 2 (two) times a day To poison ivy until clear  Patient not taking: Reported on 7/2/2019 8/14/18   Karena Harper MD     No Known Allergies    Karena Harper MD  7/2/2019,2:33 PM    Portions of the record may have been created with voice recognition software   Occasional wrong word or "sound a like" substitutions may have occurred due to the inherent limitations of voice recognition software   Read the chart carefully and recognize, using context, where substitutions have occurred

## 2019-07-02 NOTE — PATIENT INSTRUCTIONS
Wound care instructions given to patient  Patient also will be going for Mohs surgery for the previously excised basal cell left wrist with margin involvement  Wound care instructions given to patient

## 2019-07-02 NOTE — TELEPHONE ENCOUNTER
Patient came in for a procedure today    I gave them the information about the Mohs surgery scheduled for July 23, 2019 at 9:00 am

## 2019-07-16 ENCOUNTER — TELEPHONE (OUTPATIENT)
Dept: DERMATOLOGY | Facility: CLINIC | Age: 74
End: 2019-07-16

## 2019-07-16 ENCOUNTER — OFFICE VISIT (OUTPATIENT)
Dept: DERMATOLOGY | Facility: CLINIC | Age: 74
End: 2019-07-16
Payer: MEDICARE

## 2019-07-16 DIAGNOSIS — C44.722 SQUAMOUS CELL CARCINOMA OF RIGHT LOWER LEG: ICD-10-CM

## 2019-07-16 DIAGNOSIS — C44.629 SQUAMOUS CELL CANCER OF SKIN OF LEFT FOREARM: ICD-10-CM

## 2019-07-16 DIAGNOSIS — L23.7 POISON IVY: Primary | ICD-10-CM

## 2019-07-16 PROCEDURE — 99212 OFFICE O/P EST SF 10 MIN: CPT | Performed by: DERMATOLOGY

## 2019-07-16 NOTE — PROGRESS NOTES
[address] [facility] DERMATOLOGY  [address] [place] [address]  [phone]  [phone]     MRN: [id number] : 1945  Encounter: [id number]  Patient Information: Sharif Mckinney  Chief complaint:  Patient presents for recheck of his previously curetted areas and  Poison ivy  History of present illness:  41-year-old male presents for concerns regarding previously curetted area on his left upper arm and right shin wife was concerned about potential nonhealing correctly   Also patient is concerned developed some poison ivy after pulling some weeds  Past Medical History:   Diagnosis Date    Asthma     Malignant neoplasm of skin     Nonmelanoma skin cancer     Last assessed 2017      Past Surgical History:   Procedure Laterality Date    CATARACT EXTRACTION      MI REPAIR ING HERNIA,5+Y/O,REDUCIBL Left 2016    Procedure: INGUINAL HERNIA REPAIR ;  Surgeon: Rob Patel MD;  [facility];  Service: General     Social History   Social History     Substance and Sexual Activity   Alcohol Use No     Social History     Substance and Sexual Activity   Drug Use No     Social History     Tobacco Use   Smoking Status Former Smoker   Smokeless Tobacco Former User     No family history on file  Meds/Allergies   No Known Allergies    Meds:  Prior to Admission medications    Medication Sig Start Date End Date Taking? Authorizing Provider   cyanocobalamin (VITAMIN B-12) 1,000 mcg tablet Take by mouth daily    Historical Provider, MD   ezetimibe (ZETIA) 10 mg tablet Take 10 mg by mouth daily  Historical Provider, MD   fluocinonide (LIDEX) 0 05 % cream Apply topically 2 (two) times a day To poison ivy until clear  Patient not taking: Reported on 2019   Leatha Martel MD   fluticasone-salmeterol (ADVAIR) 250-50 mcg/dose inhaler Inhale 1 puff every 12 (twelve) hours      Historical Provider, MD   Multiple Vitamin (MULTIVITAMIN) capsule Take 1 capsule by mouth daily    Historical Provider, MD   PREVIDENT 5000 DRY MOUTH 1 1 % GEL BRUSH IN PLACE OF YOUR REGULAR TOOTHPASTE AT NIGHT  5/15/19   Historical Provider, MD   rosuvastatin (CRESTOR) 10 MG tablet Take 10 mg by mouth daily  Historical Provider, MD       Subjective:     Review of Systems:    General: negative for - chills, fatigue, fever,  weight gain or weight loss  Psychological: negative for - anxiety, behavioral disorder, concentration difficulties, decreased libido, depression, irritability, memory difficulties, mood swings, sleep disturbances or suicidal ideation  ENT: negative for - hearing difficulties , nasal congestion, nasal discharge, oral lesions, sinus pain, sneezing, sore throat  Allergy and Immunology: negative for - hives, insect bite sensitivity,  Hematological and Lymphatic: negative for - bleeding problems, blood clots,bruising, swollen lymph nodes  Endocrine: negative for - hair pattern changes, hot flashes, malaise/lethargy, mood swings, palpitations, polydipsia/polyuria, skin changes, temperature intolerance or unexpected weight change  Respiratory: negative for - cough, hemoptysis, orthopnea, shortness of breath, or wheezing  Cardiovascular: negative for - chest pain, dyspnea on exertion, edema,  Gastrointestinal: negative for - abdominal pain, nausea/vomiting  Genito-Urinary: negative for - dysuria, incontinence, irregular/heavy menses or urinary frequency/urgency  Musculoskeletal: negative for - gait disturbance, joint pain, joint stiffness, joint swelling, muscle pain, muscular weakness  Dermatological:  As in HPI  Neurological: negative for confusion, dizziness, headaches, impaired coordination/balance, memory loss, numbness/tingling, seizures, speech problems, tremors or weakness       Objective: There were no vitals taken for this visit      Physical Exam:    General Appearance:    Alert, cooperative, no distress   Head:    Normocephalic, without obvious abnormality, atraumatic Skin:   A full skin exam was performed including scalp, head scalp, eyes, ears, nose, lips, neck, chest, axilla, abdomen, back, buttocks, bilateral upper extremities, bilateral lower extremities, hands, feet, fingers, toes, fingernails, and toenails previous site of curettage healing well without problems sign of infection scaling erythematous widespread areas of erythema scaling on the forearms and arms     Assessment:     1  Poison ivy     2  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     3  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

## 2019-07-16 NOTE — PATIENT INSTRUCTIONS
Previous sites of skin cancer healing well continue same treatment poison ivy will go ahead treat with topical steroids  Patient has of fluocinonide from previous eruption we also will  Plan  follow-up in a few weeks to see how things look

## 2019-07-29 ENCOUNTER — OFFICE VISIT (OUTPATIENT)
Dept: FAMILY MEDICINE CLINIC | Facility: MEDICAL CENTER | Age: 74
End: 2019-07-29
Payer: MEDICARE

## 2019-07-29 VITALS
HEIGHT: 63 IN | OXYGEN SATURATION: 98 % | HEART RATE: 72 BPM | DIASTOLIC BLOOD PRESSURE: 72 MMHG | WEIGHT: 122 LBS | BODY MASS INDEX: 21.62 KG/M2 | SYSTOLIC BLOOD PRESSURE: 110 MMHG

## 2019-07-29 DIAGNOSIS — Z13.1 SCREENING FOR DIABETES MELLITUS: ICD-10-CM

## 2019-07-29 DIAGNOSIS — Z12.5 SCREENING FOR PROSTATE CANCER: ICD-10-CM

## 2019-07-29 DIAGNOSIS — Z13.29 SCREENING FOR THYROID DISORDER: ICD-10-CM

## 2019-07-29 DIAGNOSIS — J45.40 MODERATE PERSISTENT ASTHMA WITHOUT COMPLICATION: Primary | ICD-10-CM

## 2019-07-29 DIAGNOSIS — E78.2 MIXED HYPERLIPIDEMIA: ICD-10-CM

## 2019-07-29 PROBLEM — Z85.828 HISTORY OF SKIN CANCER: Status: RESOLVED | Noted: 2018-03-14 | Resolved: 2019-07-29

## 2019-07-29 PROBLEM — L98.9 CHANGING SKIN LESION: Status: RESOLVED | Noted: 2017-01-19 | Resolved: 2019-07-29

## 2019-07-29 PROBLEM — L23.7 POISON IVY: Status: RESOLVED | Noted: 2018-08-14 | Resolved: 2019-07-29

## 2019-07-29 PROBLEM — Z13.89 SCREENING FOR SKIN CONDITION: Status: RESOLVED | Noted: 2018-03-14 | Resolved: 2019-07-29

## 2019-07-29 PROCEDURE — 99204 OFFICE O/P NEW MOD 45 MIN: CPT | Performed by: FAMILY MEDICINE

## 2019-07-29 NOTE — PROGRESS NOTES
Assessment/Plan:    No problem-specific Assessment & Plan notes found for this encounter  Diagnoses and all orders for this visit:    Moderate persistent asthma without complication  -     CBC and differential; Future    Mixed hyperlipidemia  -     Lipid Panel with Direct LDL reflex; Future  -     Comprehensive metabolic panel; Future    Screening for prostate cancer  -     PSA, Total Screen; Future    Screening for thyroid disorder  -     TSH, 3rd generation with Free T4 reflex; Future    Screening for diabetes mellitus  -     Comprehensive metabolic panel; Future          Subjective:      Patient ID: Renita Wan is a 75 y o  male  Retired , served in the [address] patient  Lives with wife  76 yo Exercises frequently    Past medical history, past surgical history, family medical history, social history, and medication list were all reviewed  The following portions of the patient's history were reviewed and updated as appropriate: allergies, current medications, past family history, past medical history, past social history, past surgical history and problem list     Review of Systems   Constitutional: Negative for activity change, fatigue and fever  HENT: Negative for congestion, ear discharge, ear pain, postnasal drip, rhinorrhea, sinus pain, sneezing and sore throat  Eyes: Negative for photophobia, pain, discharge and redness  Respiratory: Negative for apnea, cough, shortness of breath and wheezing  Cardiovascular: Negative for chest pain and palpitations  Gastrointestinal: Negative for abdominal pain, blood in stool, constipation, diarrhea, nausea and vomiting  Endocrine: Negative for polydipsia, polyphagia and polyuria  Genitourinary: Negative for decreased urine volume, difficulty urinating, discharge, dysuria, frequency, penile pain and urgency  Musculoskeletal: Negative for arthralgias, gait problem, joint swelling and neck pain     Skin: Negative for color change and rash  Neurological: Negative for dizziness, tremors, seizures, weakness and headaches  Psychiatric/Behavioral: Negative for agitation and sleep disturbance  The patient is not nervous/anxious  Objective:      /72 (BP Location: Left arm, Patient Position: Sitting, Cuff Size: Adult)   Pulse 72   Ht 5' 3" (1 6 m)   Wt 55 3 kg (122 lb)   SpO2 98%   BMI 21 61 kg/m²          Physical Exam   Constitutional: He is oriented to person, place, and time  Vital signs are normal  He appears well-developed and well-nourished  He is cooperative  HENT:   Head: Normocephalic  Right Ear: External ear normal    Left Ear: External ear normal    Nose: Nose normal    Mouth/Throat: Oropharynx is clear and moist    Eyes: Pupils are equal, round, and reactive to light  Conjunctivae, EOM and lids are normal    Neck: Normal range of motion  Neck supple  Carotid bruit is not present  No thyromegaly present  Cardiovascular: Normal rate, regular rhythm, S1 normal, S2 normal, normal heart sounds, intact distal pulses and normal pulses  No murmur heard  Pulmonary/Chest: Effort normal and breath sounds normal  No respiratory distress  He has no wheezes  He has no rales  Abdominal: Soft  Normal appearance and bowel sounds are normal  He exhibits no mass  There is no hepatosplenomegaly  There is no tenderness  Musculoskeletal: Normal range of motion  Lymphadenopathy:     He has no cervical adenopathy  Neurological: He is alert and oriented to person, place, and time  He has normal strength and normal reflexes  No cranial nerve deficit or sensory deficit  Skin: Skin is warm, dry and intact  No rash noted  No pallor  Psychiatric: He has a normal mood and affect  His behavior is normal  Judgment and thought content normal  Cognition and memory are normal    Nursing note and vitals reviewed

## 2019-07-30 ENCOUNTER — TELEPHONE (OUTPATIENT)
Dept: DERMATOLOGY | Facility: CLINIC | Age: 74
End: 2019-07-30

## 2019-07-31 NOTE — ASSESSMENT & PLAN NOTE
Patient currently uses Advair inhaler daily  He has very little to none use of rescue inhaler  Continue Advair, continue routine follow-up

## 2019-07-31 NOTE — ASSESSMENT & PLAN NOTE
Patient is taking rosuvastatin for hyperlipidemia  He is new patient for me  He is due for lipid profile and liver enzymes  Continue rosuvastatin, low-fat diet

## 2019-08-06 ENCOUNTER — OFFICE VISIT (OUTPATIENT)
Dept: DERMATOLOGY | Facility: CLINIC | Age: 74
End: 2019-08-06
Payer: MEDICARE

## 2019-08-06 DIAGNOSIS — C44.629 SQUAMOUS CELL CANCER OF SKIN OF LEFT FOREARM: ICD-10-CM

## 2019-08-06 DIAGNOSIS — L98.9 UNKNOWN SKIN LESION: Primary | ICD-10-CM

## 2019-08-06 DIAGNOSIS — C44.722 SQUAMOUS CELL CARCINOMA OF RIGHT LOWER LEG: ICD-10-CM

## 2019-08-06 PROCEDURE — 88305 TISSUE EXAM BY PATHOLOGIST: CPT | Performed by: PATHOLOGY

## 2019-08-06 PROCEDURE — 99212 OFFICE O/P EST SF 10 MIN: CPT | Performed by: DERMATOLOGY

## 2019-08-06 PROCEDURE — 11102 TANGNTL BX SKIN SINGLE LES: CPT | Performed by: DERMATOLOGY

## 2019-08-06 NOTE — PATIENT INSTRUCTIONS
Skin lesion of the looks unusual await results of biopsy to see if we get better delineate the process infiltrative malignancy    Squamous cell in situ of both the left forearm and right lower leg both appear to be healing well at present will monitor these closely for any recurrence in addition patient will be undergoing the Mohs surgery in a month for the left wrist   There are also numerous other actinic keratosis noted at present will hold off on intervention until lesion on the wrist is taking care

## 2019-08-06 NOTE — PROGRESS NOTES
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 as well as the 1 on the left forearm both areas appear to be healing  However he also concerned regarding lesion on the right upper arm that suddenly developed and has been itching patient denies any insect bites or any issue that has occurred  Also numerous other scaling areas noted on his forearms     Objective: There were no vitals taken for this visit  Physical Exam:    General Appearance:    Alert, cooperative, no distress   Skin:   Previous site of excision left wrist somewhat indurated area on the left forearm appears to be healing the several other scaling erythematous areas on both forearms indurated 6 mm nodule noted on the right upper arm and previous site of curettage right shin appears to be healed area still seem slightly indurated      Shave Biopsy Procedure Note    Pre-operative Diagnosis:  Rule out malignancy versus insect bite    Plan:  1  Instructed to keep the wound dry and covered for 24 and clean thereafter  2  Warning signs of infection were reviewed  3  Recommended that the patient use OTC acetaminophen as needed for pain  4  Return  Pending results of biopsy(ies)    Locations:  Right upper arm    Indications:  Itchy irritated new lesions    Anesthesia: Lidocaine 1% with epinephrine without added sodium bicarbonate    Procedure Details     Patient informed of the risks (including bleeding and infection) and benefits of the   procedure and Verbal informed consent obtained  The lesion and surrounding area were given a sterile prep using alcohol and draped in the usual sterile fashion   A Blue blade razor was used to obtain a specimen  Hemostasis achieved with aluminum chloride  Petrolatum and a sterile dressing applied  The specimen was sent for pathologic examination  The patient tolerated the procedure(s) well  Complications:  none  Assessment:     1  Unknown skin lesion     2  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     3  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           Plan:   Skin lesion of the looks unusual await results of biopsy to see if we get better delineate the process infiltrative malignancy  Squamous cell in situ of both the left forearm and right lower leg both appear to be healing well at present will monitor these closely for any recurrence in addition patient will be undergoing the Mohs surgery in a month for the left wrist   There are also numerous other actinic keratosis noted at present will hold off on intervention until lesion on the wrist is taking care      Prior to Admission medications    Medication Sig Start Date End Date Taking? Authorizing Provider   cyanocobalamin (VITAMIN B-12) 1,000 mcg tablet Take by mouth daily   Yes Historical Provider, MD   ezetimibe (ZETIA) 10 mg tablet Take 10 mg by mouth daily  Yes Historical Provider, MD   fluticasone-salmeterol (ADVAIR) 250-50 mcg/dose inhaler Inhale 1 puff every 12 (twelve) hours  Yes Historical Provider, MD   Multiple Vitamin (MULTIVITAMIN) capsule Take 1 capsule by mouth daily   Yes Historical Provider, MD   PREVIDENT 5000 DRY MOUTH 1 1 % GEL BRUSH IN PLACE OF YOUR REGULAR TOOTHPASTE AT NIGHT  5/15/19  Yes Historical Provider, MD   rosuvastatin (CRESTOR) 10 MG tablet Take 10 mg by mouth daily     Yes Historical Provider, MD   fluocinonide (LIDEX) 0 05 % cream APPLY TOPICALLY 2 (TWO) TIMES A DAY TO POISON JAQUAN UNTIL CLEAR 8/2/19   Historical Provider, MD     No Known Allergies    Ledy Carver MD  8/6/2019,3:04 PM    Portions of the record may have been created with voice recognition software   Occasional wrong word or "sound a like" substitutions may have occurred due to the inherent limitations of voice recognition software   Read the chart carefully and recognize, using context, where substitutions have occurred

## 2019-08-07 ENCOUNTER — APPOINTMENT (OUTPATIENT)
Dept: LAB | Facility: MEDICAL CENTER | Age: 74
End: 2019-08-07
Payer: MEDICARE

## 2019-08-07 DIAGNOSIS — Z13.29 SCREENING FOR THYROID DISORDER: ICD-10-CM

## 2019-08-07 DIAGNOSIS — E78.2 MIXED HYPERLIPIDEMIA: ICD-10-CM

## 2019-08-07 DIAGNOSIS — Z12.5 SCREENING FOR PROSTATE CANCER: ICD-10-CM

## 2019-08-07 DIAGNOSIS — Z13.1 SCREENING FOR DIABETES MELLITUS: ICD-10-CM

## 2019-08-07 DIAGNOSIS — J45.40 MODERATE PERSISTENT ASTHMA WITHOUT COMPLICATION: ICD-10-CM

## 2019-08-07 LAB
ALBUMIN SERPL BCP-MCNC: 3.2 G/DL (ref 3.5–5)
ALP SERPL-CCNC: 52 U/L (ref 46–116)
ALT SERPL W P-5'-P-CCNC: 41 U/L (ref 12–78)
ANION GAP SERPL CALCULATED.3IONS-SCNC: 4 MMOL/L (ref 4–13)
AST SERPL W P-5'-P-CCNC: 20 U/L (ref 5–45)
BASOPHILS # BLD AUTO: 0.08 THOUSANDS/ΜL (ref 0–0.1)
BASOPHILS NFR BLD AUTO: 1 % (ref 0–1)
BILIRUB SERPL-MCNC: 0.69 MG/DL (ref 0.2–1)
BUN SERPL-MCNC: 9 MG/DL (ref 5–25)
CALCIUM SERPL-MCNC: 8.9 MG/DL (ref 8.3–10.1)
CHLORIDE SERPL-SCNC: 104 MMOL/L (ref 100–108)
CHOLEST SERPL-MCNC: 125 MG/DL (ref 50–200)
CO2 SERPL-SCNC: 27 MMOL/L (ref 21–32)
CREAT SERPL-MCNC: 0.83 MG/DL (ref 0.6–1.3)
EOSINOPHIL # BLD AUTO: 0.35 THOUSAND/ΜL (ref 0–0.61)
EOSINOPHIL NFR BLD AUTO: 4 % (ref 0–6)
ERYTHROCYTE [DISTWIDTH] IN BLOOD BY AUTOMATED COUNT: 12.7 % (ref 11.6–15.1)
GFR SERPL CREATININE-BSD FRML MDRD: 86 ML/MIN/1.73SQ M
GLUCOSE P FAST SERPL-MCNC: 84 MG/DL (ref 65–99)
HCT VFR BLD AUTO: 40.7 % (ref 36.5–49.3)
HDLC SERPL-MCNC: 47 MG/DL (ref 40–60)
HGB BLD-MCNC: 13.2 G/DL (ref 12–17)
IMM GRANULOCYTES # BLD AUTO: 0.04 THOUSAND/UL (ref 0–0.2)
IMM GRANULOCYTES NFR BLD AUTO: 0 % (ref 0–2)
LDLC SERPL CALC-MCNC: 66 MG/DL (ref 0–100)
LYMPHOCYTES # BLD AUTO: 1.47 THOUSANDS/ΜL (ref 0.6–4.47)
LYMPHOCYTES NFR BLD AUTO: 16 % (ref 14–44)
MCH RBC QN AUTO: 31.8 PG (ref 26.8–34.3)
MCHC RBC AUTO-ENTMCNC: 32.4 G/DL (ref 31.4–37.4)
MCV RBC AUTO: 98 FL (ref 82–98)
MONOCYTES # BLD AUTO: 0.75 THOUSAND/ΜL (ref 0.17–1.22)
MONOCYTES NFR BLD AUTO: 8 % (ref 4–12)
NEUTROPHILS # BLD AUTO: 6.33 THOUSANDS/ΜL (ref 1.85–7.62)
NEUTS SEG NFR BLD AUTO: 71 % (ref 43–75)
NRBC BLD AUTO-RTO: 0 /100 WBCS
PLATELET # BLD AUTO: 162 THOUSANDS/UL (ref 149–390)
PMV BLD AUTO: 11 FL (ref 8.9–12.7)
POTASSIUM SERPL-SCNC: 4.3 MMOL/L (ref 3.5–5.3)
PROT SERPL-MCNC: 6.4 G/DL (ref 6.4–8.2)
PSA SERPL-MCNC: 0.4 NG/ML (ref 0–4)
RBC # BLD AUTO: 4.15 MILLION/UL (ref 3.88–5.62)
SODIUM SERPL-SCNC: 135 MMOL/L (ref 136–145)
TRIGL SERPL-MCNC: 61 MG/DL
TSH SERPL DL<=0.05 MIU/L-ACNC: 1.93 UIU/ML (ref 0.36–3.74)
WBC # BLD AUTO: 9.02 THOUSAND/UL (ref 4.31–10.16)

## 2019-08-07 PROCEDURE — 36415 COLL VENOUS BLD VENIPUNCTURE: CPT

## 2019-08-07 PROCEDURE — 80053 COMPREHEN METABOLIC PANEL: CPT

## 2019-08-07 PROCEDURE — 85025 COMPLETE CBC W/AUTO DIFF WBC: CPT

## 2019-08-07 PROCEDURE — 84443 ASSAY THYROID STIM HORMONE: CPT

## 2019-08-07 PROCEDURE — 80061 LIPID PANEL: CPT

## 2019-08-07 PROCEDURE — G0103 PSA SCREENING: HCPCS

## 2019-09-17 ENCOUNTER — PROCEDURE VISIT (OUTPATIENT)
Dept: DERMATOLOGY | Facility: CLINIC | Age: 74
End: 2019-09-17
Payer: MEDICARE

## 2019-09-17 DIAGNOSIS — C44.622 SQUAMOUS CELL SKIN CANCER, UPPER ARM, RIGHT: Primary | ICD-10-CM

## 2019-09-17 PROCEDURE — 11602 EXC TR-EXT MAL+MARG 1.1-2 CM: CPT | Performed by: DERMATOLOGY

## 2019-09-17 PROCEDURE — 88305 TISSUE EXAM BY PATHOLOGIST: CPT | Performed by: PATHOLOGY

## 2019-09-17 PROCEDURE — 12034 INTMD RPR S/TR/EXT 7.6-12.5: CPT | Performed by: DERMATOLOGY

## 2019-09-17 NOTE — PATIENT INSTRUCTIONS
Lesions x2 excise the 2nd lesion since it is large in since he was last year we elected to go ahead and remove it completely at this point prior to biopsy  Also lesion noted on the right dorsum hand will plan on biopsy at his next visit  Wound care instructions given to patient

## 2019-09-17 NOTE — PROGRESS NOTES
[address]  [address] [facility]  [place] [address]  [phone]  [phone]     MRN: [id number] : 1945  Encounter: [id number]  Patient Information: Sharif Mckinney    Subjective:     67-year-old male presents for follow-up previously biopsied squamous cell carcinoma right upper arm the lesion that was slightly superior that we noted last time appears to be growing and more active there also was another spot noted on the dorsum of his right hand patient also underwent Mohs surgery last week for the squamous cell carcinoma on left wrist     Objective: There were no vitals taken for this visit  Physical Exam:    General Appearance:    Alert, cooperative, no distress   Skin:   Previous site of biopsy noted in addition there is a 6 mm keratotic nodule noted more proximal and a 6 mm keratotic nodule noted on dorsum of right hand  We elected to go ahead and excise both lesions on the right upper arm at this time    Procedure name: Excision with intermediate layered closure        Location:  Distal right upper arm    Diagnosis: Squamous cell carcinoma    Size of lesion: 6 mm    Margins: 4 mm    Size + Margins: 14 mm    I explained the diagnosis to the patient and we recommend an excision of the lesion for diagnosis and/or treatment  Potential complications include, but are not limited to: scarring, bleeding, infection, incomplete removal, recurrence, and nerve damage  The risks, benefits, and alternatives were discussed with the patient in detail  The location of the tumor was identified, circled, and confirmed by the patient  The correct patient, site, and procedure were confirmed  Anesthesia: 1% xylocaine with 1:100,000 epinephrine 4 cc  The patient was brought into the room, prepped and draped sterilely in the usual manner and anesthesia was administered by local infiltration    A fusiform shape was drawn around the lesion, and the margins were incised to the level of the subcutaneous fat with a number 15cc Bard-Gwyn blade  The tissue was removed with sharp and blunt dissection  The lateral margins of the resulting defect were undermined with sharp and blunt dissection  Hemostasis was achieved with electrocautery  The deeper layers of the defect, including subcutaneous fat and fascia, had to be approximated to reduce tension on the suture line  Layered wound closure was performed  The wound was cleaned with saline, dried off, petroleum applied, and the wound was covered with a pressure dressing  The patient was given detailed verbal and written instructions on postoperative care  Suture for adipose/fascial/dermal layer closure: 4-0  vicryl 3 sutures interrupted    Suture used to approximate epidermis: 4-0  nylon 7 sutures interrupted    Final wound length: 4 5 cm    Follow-up in: 10 days  Patient tolerated procedure well without complications  Procedure name: Excision with intermediate layered closure        Location:  Right upper arm proximal    Diagnosis: Squamous cell carcinoma presumed    Size of lesion: 7  mm    Margins: 4 mm    Size + Margins: 15 mm    I explained the diagnosis to the patient and we recommend an excision of the lesion for diagnosis and/or treatment  Potential complications include, but are not limited to: scarring, bleeding, infection, incomplete removal, recurrence, and nerve damage  The risks, benefits, and alternatives were discussed with the patient in detail  The location of the tumor was identified, circled, and confirmed by the patient  The correct patient, site, and procedure were confirmed  Anesthesia: 1% xylocaine with 1:100,000 epinephrine 5 cc  The patient was brought into the room, prepped and draped sterilely in the usual manner and anesthesia was administered by local infiltration    A fusiform shape was drawn around the lesion, and the margins were incised to the level of the subcutaneous fat with a number 15cc Bard-Gwyn blade  The tissue was removed with sharp and blunt dissection  The lateral margins of the resulting defect were undermined with sharp and blunt dissection  Hemostasis was achieved with electrocautery  The deeper layers of the defect, including subcutaneous fat and fascia, had to be approximated to reduce tension on the suture line  Layered wound closure was performed  The wound was cleaned with saline, dried off, petroleum applied, and the wound was covered with a pressure dressing  The patient was given detailed verbal and written instructions on postoperative care  Suture for adipose/fascial/dermal layer closure: 4-0  vicryl 3 sutures interrupted    Suture used to approximate epidermis: 5-0  nylon 7 sutures interrupted    Final wound length: 4 3 cm    Follow-up in: 10 days  Patient tolerated procedure well without complications  Assessment:     1  Squamous cell skin cancer, upper arm, right           Plan:   Lesions x2 excise the 2nd lesion since it is large in since he was last year we elected to go ahead and remove it completely at this point prior to biopsy  Also lesion noted on the right dorsum hand will plan on biopsy at his next visit      Prior to Admission medications    Medication Sig Start Date End Date Taking? Authorizing Provider   cyanocobalamin (VITAMIN B-12) 1,000 mcg tablet Take by mouth daily   Yes Historical Provider, MD   ezetimibe (ZETIA) 10 mg tablet Take 10 mg by mouth daily  Yes Historical Provider, MD   fluocinonide (LIDEX) 0 05 % cream APPLY TOPICALLY 2 (TWO) TIMES A DAY TO POISON JAQUAN UNTIL CLEAR 8/2/19  Yes Historical Provider, MD   fluticasone-salmeterol (ADVAIR) 250-50 mcg/dose inhaler Inhale 1 puff every 12 (twelve) hours     Yes Historical Provider, MD   Multiple Vitamin (MULTIVITAMIN) capsule Take 1 capsule by mouth daily   Yes Historical Provider, MD   PREVIDENT 5000 DRY MOUTH 1 1 % GEL BRUSH IN PLACE OF YOUR REGULAR TOOTHPASTE AT NIGHT  5/15/19  Yes Historical Provider, MD   rosuvastatin (CRESTOR) 10 MG tablet Take 10 mg by mouth daily  Yes Historical Provider, MD     No Known Allergies    Liana Gitelman, MD  9/17/2019,2:38 PM    Portions of the record may have been created with voice recognition software   Occasional wrong word or "sound a like" substitutions may have occurred due to the inherent limitations of voice recognition software   Read the chart carefully and recognize, using context, where substitutions have occurred

## 2019-09-27 ENCOUNTER — CLINICAL SUPPORT (OUTPATIENT)
Dept: DERMATOLOGY | Facility: CLINIC | Age: 74
End: 2019-09-27
Payer: MEDICARE

## 2019-09-27 DIAGNOSIS — L98.9 UNKNOWN SKIN LESION: ICD-10-CM

## 2019-09-27 DIAGNOSIS — C44.629 SQUAMOUS CELL CANCER OF SKIN OF LEFT FOREARM: ICD-10-CM

## 2019-09-27 DIAGNOSIS — C44.622 SQUAMOUS CELL SKIN CANCER, UPPER ARM, RIGHT: ICD-10-CM

## 2019-09-27 PROCEDURE — 88305 TISSUE EXAM BY PATHOLOGIST: CPT | Performed by: PATHOLOGY

## 2019-09-27 PROCEDURE — 99024 POSTOP FOLLOW-UP VISIT: CPT | Performed by: DERMATOLOGY

## 2019-09-27 PROCEDURE — 11102 TANGNTL BX SKIN SINGLE LES: CPT | Performed by: DERMATOLOGY

## 2019-09-27 NOTE — PATIENT INSTRUCTIONS
Previous sites of excision patient is to continue same treatment await results of biopsy will discuss with him regarding the area on the arm when available and probable squamous cell carcinoma will go ahead and schedule him today for excision

## 2019-09-27 NOTE — PROGRESS NOTES
[address]  [address] [facility] [address]  [phone]  [phone]     MRN: [id number] : 1945  Encounter: [id number]  Patient Information: Ирина Garza    Subjective:     18-year-old male presents for suture removal from previously excised squamous cell carcinoma right upper also concerning regarding lesion we noted on his right hand and his previous Mohs surgical site     Objective: There were no vitals taken for this visit  Physical Exam:    General Appearance:    Alert, cooperative, no distress   Skin:   Previous site excision healing well keratotic 7 mm nodule noted on the right dorsum of the hand with a central core the area of previous excision shows a area of wound necrosis is some area of opening but overall no sign of infection        Procedure:  Suture removal  Site of excision:  Right upper arm x2  Wound was cleansed with saline  Wound is intact  Sutures removed  Steri-Strips applied  Biopsy still pending  Shave Biopsy Procedure Note    Pre-operative Diagnosis:  Squamous cell carcinoma  Plan:  1  Instructed to keep the wound dry and covered for 24 and clean thereafter  2  Warning signs of infection were reviewed  3  Recommended that the patient use OTC acetaminophen as needed for pain  4  Return  Pending results of biopsy(ies)    Locations:  Right dorsum of the hand    Indications:  Nonhealing lesion    Anesthesia: Lidocaine 1% with epinephrine without added sodium bicarbonate    Procedure Details     Patient informed of the risks (including bleeding and infection) and benefits of the   procedure and Verbal informed consent obtained  The lesion and surrounding area were given a sterile prep using alcohol and draped in the usual sterile fashion  A Blue blade razor was used to obtain a specimen  Hemostasis achieved with aluminum chloride  Petrolatum and a sterile dressing applied    The specimen was sent for pathologic examination  The patient tolerated the procedure(s) well  Complications:  none  Assessment:     1  Squamous cell skin cancer, upper arm, right     2  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 Authorizing Provider   cyanocobalamin (VITAMIN B-12) 1,000 mcg tablet Take by mouth daily   Yes Historical Provider, MD   ezetimibe (ZETIA) 10 mg tablet Take 10 mg by mouth daily  Yes Historical Provider, MD   fluocinonide (LIDEX) 0 05 % cream APPLY TOPICALLY 2 (TWO) TIMES A DAY TO POISON JAQUAN UNTIL CLEAR 8/2/19  Yes Historical Provider, MD   fluticasone-salmeterol (ADVAIR) 250-50 mcg/dose inhaler Inhale 1 puff every 12 (twelve) hours  Yes Historical Provider, MD   Multiple Vitamin (MULTIVITAMIN) capsule Take 1 capsule by mouth daily   Yes Historical Provider, MD   PREVIDENT 5000 DRY MOUTH 1 1 % GEL BRUSH IN PLACE OF YOUR REGULAR TOOTHPASTE AT NIGHT  5/15/19  Yes Historical Provider, MD   rosuvastatin (CRESTOR) 10 MG tablet Take 10 mg by mouth daily  Yes Historical Provider, MD     No Known Allergies    Nida Baeza MD  9/27/2019,12:38 PM    Portions of the record may have been created with voice recognition software   Occasional wrong word or "sound a like" substitutions may have occurred due to the inherent limitations of voice recognition software   Read the chart carefully and recognize, using context, where substitutions have occurred

## 2019-10-28 ENCOUNTER — PROCEDURE VISIT (OUTPATIENT)
Dept: DERMATOLOGY | Facility: CLINIC | Age: 74
End: 2019-10-28
Payer: MEDICARE

## 2019-10-28 DIAGNOSIS — C44.622 SQUAMOUS CELL CARCINOMA OF DORSUM OF RIGHT HAND: Primary | ICD-10-CM

## 2019-10-28 PROCEDURE — 88305 TISSUE EXAM BY PATHOLOGIST: CPT | Performed by: STUDENT IN AN ORGANIZED HEALTH CARE EDUCATION/TRAINING PROGRAM

## 2019-10-28 PROCEDURE — 11622 EXC S/N/H/F/G MAL+MRG 1.1-2: CPT | Performed by: DERMATOLOGY

## 2019-10-28 PROCEDURE — 12042 INTMD RPR N-HF/GENIT2.6-7.5: CPT | Performed by: DERMATOLOGY

## 2019-10-28 NOTE — PROGRESS NOTES
[address]  [address] [facility] [address]  [phone]  [phone]     MRN: [id number] : 1945  Encounter: [id number]  Patient Information: Cb Lucero    Subjective:     41-year-old male presents for planned excision of previously biopsied squamous cell carcinoma right dorsum of the hand   Objective: There were no vitals taken for this visit  Physical Exam:    General Appearance:    Alert, cooperative, no distress   Skin:   Previous site of biopsy noted    Procedure name: Excision with intermediate layered closure        Location:  Right dorsum of the hand    Diagnosis: Squamous cell carcinoma    Size of lesion: 7 mm    Margins: 4 mm    Size + Margins: 15 mm    I explained the diagnosis to the patient and we recommend an excision of the lesion for diagnosis and/or treatment  Potential complications include, but are not limited to: scarring, bleeding, infection, incomplete removal, recurrence, and nerve damage  The risks, benefits, and alternatives were discussed with the patient in detail  The location of the tumor was identified, circled, and confirmed by the patient  The correct patient, site, and procedure were confirmed  Anesthesia: 1% xylocaine with 1:100,000 epinephrine 6 cc  The patient was brought into the room, prepped and draped sterilely in the usual manner and anesthesia was administered by local infiltration  A fusiform shape was drawn around the lesion, and the margins were incised to the level of the subcutaneous fat with a number 15cc Bard-Gwyn blade  The tissue was removed with sharp and blunt dissection  The lateral margins of the resulting defect were undermined with sharp and blunt dissection  Hemostasis was achieved with electrocautery  The deeper layers of the defect, including subcutaneous fat and fascia, had to be approximated to reduce tension on the suture line    Layered wound closure was performed  The wound was cleaned with saline, dried off, petroleum applied, and the wound was covered with a pressure dressing  The patient was given detailed verbal and written instructions on postoperative care  Suture for adipose/fascial/dermal layer closure: 4-0  vicryl 3sutures interrupted    Suture used to approximate epidermis: 5-0  nylon 6sutures interrupted    Final wound length: 4 0 cm    Follow-up in: 10 days  Patient tolerated procedure well without complications  Assessment:     1  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 Authorizing Provider   cyanocobalamin (VITAMIN B-12) 1,000 mcg tablet Take by mouth daily    Historical Provider, MD   ezetimibe (ZETIA) 10 mg tablet Take 10 mg by mouth daily  Historical Provider, MD   fluocinonide (LIDEX) 0 05 % cream APPLY TOPICALLY 2 (TWO) TIMES A DAY TO POISON JAQUAN UNTIL CLEAR 8/2/19   Historical Provider, MD   fluticasone-salmeterol (ADVAIR) 250-50 mcg/dose inhaler Inhale 1 puff every 12 (twelve) hours  Historical Provider, MD   Multiple Vitamin (MULTIVITAMIN) capsule Take 1 capsule by mouth daily    Historical Provider, MD   PREVIDENT 5000 DRY MOUTH 1 1 % GEL BRUSH IN PLACE OF YOUR REGULAR TOOTHPASTE AT NIGHT  5/15/19   Historical Provider, MD   rosuvastatin (CRESTOR) 10 MG tablet Take 10 mg by mouth daily  Historical Provider, MD     No Known Allergies    Sammi Duggan MD  10/28/2019,2:28 PM    Portions of the record may have been created with voice recognition software   Occasional wrong word or "sound a like" substitutions may have occurred due to the inherent limitations of voice recognition software   Read the chart carefully and recognize, using context, where substitutions have occurred

## 2019-11-07 ENCOUNTER — CLINICAL SUPPORT (OUTPATIENT)
Dept: DERMATOLOGY | Facility: CLINIC | Age: 74
End: 2019-11-07
Payer: MEDICARE

## 2019-11-07 DIAGNOSIS — L57.0 ACTINIC KERATOSIS: ICD-10-CM

## 2019-11-07 DIAGNOSIS — C44.622 SQUAMOUS CELL CARCINOMA OF DORSUM OF RIGHT HAND: Primary | ICD-10-CM

## 2019-11-07 PROCEDURE — 99024 POSTOP FOLLOW-UP VISIT: CPT | Performed by: DERMATOLOGY

## 2019-11-07 PROCEDURE — 17000 DESTRUCT PREMALG LESION: CPT | Performed by: DERMATOLOGY

## 2019-11-07 PROCEDURE — 17003 DESTRUCT PREMALG LES 2-14: CPT | Performed by: DERMATOLOGY

## 2019-11-07 NOTE — PATIENT INSTRUCTIONS
Squamous cell carcinoma well-healed no further treatment needed  Actinic Keratosis:  Patient advised lesions are precancers  These should resolve with cryosurgery if not to let us know sun block recommended  Treatment with Cryotherapy    The doctor has treated your skin with nitrogen, which is 320 degrees Fahrenheit below zero  He has given the treated area "frostbite "    Stinging should subside within a few hours  You can take Tylenol for pain, if needed  Over the next few days, it is normal if the area becomes reddened, a blood blister, or swollen with fluid  If the lesion treated was near the eye - you could get a swollen eye over the next few days  Do not panic! This is all temporary, and will resolve with time  There is no special treatment - just keep the area clean  Makeup and BandAids can be used, if preferred  When the area starts to dry up and peel off, using Vaseline can help healing  It usually takes up to a month for it to heal   Some lesions are recurrent and may require repeat treatments  If a lesion has not healed in one month, please don't hesitate to contact us  If you have any further questions that are not answered here, please call us  [phone]    Thank you for allowing us to care for you

## 2019-11-07 NOTE — PROGRESS NOTES
[address]  [facility]  [place] [address]  [phone]  [phone]     MRN: [id number] : 1945  Encounter: [id number]  Patient Information: Ting Guzmán    Subjective:     41-year-old male presents for previously excised squamous cell carcinoma right dorsum of the hand  Also complaining numerous other scaling areas on his hand as well as on his arms     Objective: There were no vitals taken for this visit  Physical Exam:    General Appearance:    Alert, cooperative, no distress   Skin:  Previous site of excision healing well numerous scaling erythematous areas noted below normal keratotic papules with greasy stuck on appearance  Physical Exam   Constitutional:          Procedure:  Suture removal  Site of excision:  Right dorsum of the hand  Wound was cleansed with saline  Wound is intact  Sutures removed  Steri-Strips applied  Biopsy revealed squamous cell carcinoma margins clear    Cryotherapy Procedure Note    Pre-operative Diagnosis: actinic keratosis    Plan:  1  Instructed to keep the area dry and clean thereafter  Apply petrolatum if area gets crusty  2  Recommended that the patient use acetaminophen  as needed for pain  Locations:  Dorsum of the hands and dorsum of the arms    Indications: Destruction of actinic keratosis times 8    Patient informed of risks (permanent scarring, infection, light or dark discoloration, bleeding, infection, weakness, numbness and recurrence of the lesion) and benefits of the procedure and verbal informed consent obtained  The areas are treated with liquid nitrogen therapy, frozen until ice ball extended 2 mm beyond lesion, allowed to thaw, and treated again  The patient tolerated procedure well  The patient was instructed on post-op care, warned that there may be blister formation, redness and pain   Recommend OTC analgesia as needed for pain     Condition:  Stable    Complications:  none  Assessment:     1  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 Authorizing Provider   cyanocobalamin (VITAMIN B-12) 1,000 mcg tablet Take by mouth daily    Historical Provider, MD   ezetimibe (ZETIA) 10 mg tablet Take 10 mg by mouth daily  Historical Provider, MD   fluocinonide (LIDEX) 0 05 % cream APPLY TOPICALLY 2 (TWO) TIMES A DAY TO POISON JAQUAN UNTIL CLEAR 8/2/19   Historical Provider, MD   fluticasone-salmeterol (ADVAIR) 250-50 mcg/dose inhaler Inhale 1 puff every 12 (twelve) hours  Historical Provider, MD   Multiple Vitamin (MULTIVITAMIN) capsule Take 1 capsule by mouth daily    Historical Provider, MD   PREVIDENT 5000 DRY MOUTH 1 1 % GEL BRUSH IN PLACE OF YOUR REGULAR TOOTHPASTE AT NIGHT  5/15/19   Historical Provider, MD   rosuvastatin (CRESTOR) 10 MG tablet Take 10 mg by mouth daily  Historical Provider, MD     No Known Allergies    Emy Vital MD  11/7/2019,9:21 AM    Portions of the record may have been created with voice recognition software   Occasional wrong word or "sound a like" substitutions may have occurred due to the inherent limitations of voice recognition software   Read the chart carefully and recognize, using context, where substitutions have occurred

## 2020-02-12 ENCOUNTER — OFFICE VISIT (OUTPATIENT)
Dept: DERMATOLOGY | Facility: CLINIC | Age: 75
End: 2020-02-12
Payer: MEDICARE

## 2020-02-12 DIAGNOSIS — Z13.89 SCREENING FOR SKIN CONDITION: ICD-10-CM

## 2020-02-12 DIAGNOSIS — L82.1 SEBORRHEIC KERATOSIS: ICD-10-CM

## 2020-02-12 DIAGNOSIS — L98.9 UNKNOWN SKIN LESION: Primary | ICD-10-CM

## 2020-02-12 DIAGNOSIS — Z85.828 HISTORY OF SKIN CANCER: ICD-10-CM

## 2020-02-12 PROCEDURE — 88305 TISSUE EXAM BY PATHOLOGIST: CPT | Performed by: STUDENT IN AN ORGANIZED HEALTH CARE EDUCATION/TRAINING PROGRAM

## 2020-02-12 PROCEDURE — 11103 TANGNTL BX SKIN EA SEP/ADDL: CPT | Performed by: DERMATOLOGY

## 2020-02-12 PROCEDURE — 99213 OFFICE O/P EST LOW 20 MIN: CPT | Performed by: DERMATOLOGY

## 2020-02-12 PROCEDURE — 1160F RVW MEDS BY RX/DR IN RCRD: CPT | Performed by: DERMATOLOGY

## 2020-02-12 PROCEDURE — 11102 TANGNTL BX SKIN SINGLE LES: CPT | Performed by: DERMATOLOGY

## 2020-02-12 PROCEDURE — 1036F TOBACCO NON-USER: CPT | Performed by: DERMATOLOGY

## 2020-02-12 NOTE — PROGRESS NOTES
[facility] DERMATOLOGY  [address] [place] [address] [facility] [address]  [phone]  [phone]     MRN: [id number] : 1945  Encounter: [id number]  Patient Information: Andree Hilario  Chief complaint: Three-month checkup    History of present illness:  71-year-old male with previous history of multiple nonmelanoma skin cancers presents for overall checkup no specific complaints  Past Medical History:   Diagnosis Date    Asthma     Malignant neoplasm of skin     Nonmelanoma skin cancer     Last assessed 2017      Past Surgical History:   Procedure Laterality Date    CATARACT EXTRACTION      WV REPAIR ING HERNIA,5+Y/O,REDUCIBL Left 2016    Procedure: INGUINAL HERNIA REPAIR ;  Surgeon: Kika Mederos MD;  Location: [facility];  Service: General     Social History   Social History     Substance and Sexual Activity   Alcohol Use No     Social History     Substance and Sexual Activity   Drug Use No     Social History     Tobacco Use   Smoking Status Former Smoker   Smokeless Tobacco Former User     Family History   Problem Relation Age of Onset    Coronary artery disease Father     No Known Problems Sister     No Known Problems Brother      Meds/Allergies   No Known Allergies    Meds:  Prior to Admission medications    Medication Sig Start Date End Date Taking? Authorizing Provider   cyanocobalamin (VITAMIN B-12) 1,000 mcg tablet Take by mouth daily   Yes Historical Provider, MD   ezetimibe (ZETIA) 10 mg tablet Take 10 mg by mouth daily  Yes Historical Provider, MD   fluocinonide (LIDEX) 0 05 % cream APPLY TOPICALLY 2 (TWO) TIMES A DAY TO POISON JAQUAN UNTIL CLEAR 19  Yes Historical Provider, MD   fluticasone-salmeterol (ADVAIR) 250-50 mcg/dose inhaler Inhale 1 puff every 12 (twelve) hours     Yes Historical Provider, MD   Multiple Vitamin (MULTIVITAMIN) capsule Take 1 capsule by mouth daily   Yes Historical Provider, MD   PREVIDENT 5000 DRY MOUTH 1 1 % GEL BRUSH IN PLACE OF YOUR REGULAR TOOTHPASTE AT NIGHT  5/15/19  Yes Historical Provider, MD   rosuvastatin (CRESTOR) 10 MG tablet Take 10 mg by mouth daily  Yes Historical Provider, MD       Subjective:     Review of Systems:    General: negative for - chills, fatigue, fever,  weight gain or weight loss  Psychological: negative for - anxiety, behavioral disorder, concentration difficulties, decreased libido, depression, irritability, memory difficulties, mood swings, sleep disturbances or suicidal ideation  ENT: negative for - hearing difficulties , nasal congestion, nasal discharge, oral lesions, sinus pain, sneezing, sore throat  Allergy and Immunology: negative for - hives, insect bite sensitivity,  Hematological and Lymphatic: negative for - bleeding problems, blood clots,bruising, swollen lymph nodes  Endocrine: negative for - hair pattern changes, hot flashes, malaise/lethargy, mood swings, palpitations, polydipsia/polyuria, skin changes, temperature intolerance or unexpected weight change  Respiratory: negative for - cough, hemoptysis, orthopnea, shortness of breath, or wheezing  Cardiovascular: negative for - chest pain, dyspnea on exertion, edema,  Gastrointestinal: negative for - abdominal pain, nausea/vomiting  Genito-Urinary: negative for - dysuria, incontinence, irregular/heavy menses or urinary frequency/urgency  Musculoskeletal: negative for - gait disturbance, joint pain, joint stiffness, joint swelling, muscle pain, muscular weakness  Dermatological:  As in HPI  Neurological: negative for confusion, dizziness, headaches, impaired coordination/balance, memory loss, numbness/tingling, seizures, speech problems, tremors or weakness       Objective: There were no vitals taken for this visit      Physical Exam:    General Appearance:    Alert, cooperative, no distress   Head:    Normocephalic, without obvious abnormality, atraumatic           Skin:   A full skin exam was performed including scalp, head scalp, eyes, ears, nose, lips, neck, chest, axilla, abdomen, back, buttocks, bilateral upper extremities, bilateral lower extremities, hands, feet, fingers, toes, fingernails, and toenails 3 mm slightly indurated papule noted on the right forehead 4 mm keratotic papule noted on the right lower leg 23 mm pearly macule noted left shin and lastly a 24 mm pearly macule noted on the left back skin numerous other scaling areas seen normal keratotic papules greasy stuck appearance nothing else remarkable noted exam                Shave Biopsy Procedure Note    Pre-operative Diagnosis:  Basal cell/squamous cell carcinoma    Plan:  1  Instructed to keep the wound dry and covered for 24 and clean thereafter  2  Warning signs of infection were reviewed  3  Recommended that the patient use OTC acetaminophen as needed for pain  4  Return  Pending results of biopsy(ies)    Locations:  Right forehead right shin left shin and left back    Indications:  Suspicious lesions    Anesthesia: Lidocaine 1% without epinephrine without added sodium bicarbonate    Procedure Details     Patient informed of the risks (including bleeding and infection) and benefits of the   procedure and Verbal informed consent obtained  The lesion and surrounding area were given a sterile prep using alcohol and draped in the usual sterile fashion  A Blue blade razor was used to obtain a specimen  Hemostasis achieved with aluminum chloride  Petrolatum and a sterile dressing applied  The specimen was sent for pathologic examination  The patient tolerated the procedure(s) well  Complications:  none  Assessment:     1  Unknown skin lesion     2  Seborrheic keratosis     3  Screening for skin condition     4   History of skin cancer           Plan:   Skin lesions area on the right forehead would require excision the other lesions may be amenable to curettage even though they are quite large  Seborrheic keratosis patient reassured these are normal growths we acquire with age no treatment needed  History of skin cancer in no recurrence nothing else atypical sunblock recommended follow-up in 6 months  Screening for dermatologic disorders nothing else of concern noted on complete exam follow-up in 6 months    Breana Shaikh MD  2/12/2020,2:18 PM    Portions of the record may have been created with voice recognition software   Occasional wrong word or "sound a like" substitutions may have occurred due to the inherent limitations of voice recognition software   Read the chart carefully and recognize, using context, where substitutions have occurred

## 2020-03-18 ENCOUNTER — PROCEDURE VISIT (OUTPATIENT)
Dept: DERMATOLOGY | Facility: CLINIC | Age: 75
End: 2020-03-18
Payer: MEDICARE

## 2020-03-18 DIAGNOSIS — C44.519 BASAL CELL CARCINOMA (BCC) OF BACK: ICD-10-CM

## 2020-03-18 DIAGNOSIS — C44.719 BASAL CELL CARCINOMA (BCC) OF LEFT LOWER LEG: ICD-10-CM

## 2020-03-18 DIAGNOSIS — C44.329 SQUAMOUS CELL CARCINOMA OF FOREHEAD: Primary | ICD-10-CM

## 2020-03-18 DIAGNOSIS — C44.722 SQUAMOUS CELL CARCINOMA OF RIGHT LOWER LEG: ICD-10-CM

## 2020-03-18 PROCEDURE — 11642 EXC F/E/E/N/L MAL+MRG 1.1-2: CPT | Performed by: DERMATOLOGY

## 2020-03-18 PROCEDURE — 88305 TISSUE EXAM BY PATHOLOGIST: CPT | Performed by: STUDENT IN AN ORGANIZED HEALTH CARE EDUCATION/TRAINING PROGRAM

## 2020-03-18 PROCEDURE — 17260 DSTRJ MAL LES T/A/L 0.5 CM/<: CPT | Performed by: DERMATOLOGY

## 2020-03-18 PROCEDURE — 12052 INTMD RPR FACE/MM 2.6-5.0 CM: CPT | Performed by: DERMATOLOGY

## 2020-03-18 PROCEDURE — 17263 DSTRJ MAL LES T/A/L 2.1-3.0: CPT | Performed by: DERMATOLOGY

## 2020-03-18 NOTE — PROGRESS NOTES
[facility] DERMATOLOGY  [address] [place] [address]  [facility] [address]  [phone]  [phone]     MRN: [id number] : 1945  Encounter: [id number]  Patient Information: Judith Zarate    Subjective:     56-year-old male presents for planned treatment of previously biopsied squamous cell carcinoma right forehead invasive squamous cell cool right chin basal cell carcinoma left shin and basal cell carcinoma left back     Objective: There were no vitals taken for this visit  Physical Exam:    General Appearance:    Alert, cooperative, no distress   Skin:   Previous sites of biopsies noted none of the areas appear indurated may be slightly on the right forehead    Procedure name: Excision with intermediate layered closure        Location:  Right forehead    Diagnosis: Squamous cell carcinoma    Size of lesion: 3 mm    Margins: 4 mm    Size + Margins: 11 mm    I explained the diagnosis to the patient and we recommend an excision of the lesion for diagnosis and/or treatment  Potential complications include, but are not limited to: scarring, bleeding, infection, incomplete removal, recurrence, and nerve damage  The risks, benefits, and alternatives were discussed with the patient in detail  The location of the tumor was identified, circled, and confirmed by the patient  The correct patient, site, and procedure were confirmed  Anesthesia: 1% xylocaine with 1:100,000 epinephrine 4 cc  The patient was brought into the room, prepped and draped sterilely in the usual manner and anesthesia was administered by local infiltration  A fusiform shape was drawn around the lesion, and the margins were incised to the level of the subcutaneous fat with a number 15cc Bard-Gwyn blade  The tissue was removed with sharp and blunt dissection  The lateral margins of the resulting defect were undermined with sharp and blunt dissection    Hemostasis was achieved with electrocautery  The deeper layers of the defect, including subcutaneous fat and fascia, had to be approximated to reduce tension on the suture line  Layered wound closure was performed  The wound was cleaned with saline, dried off, petroleum applied, and the wound was covered with a pressure dressing  The patient was given detailed verbal and written instructions on postoperative care  Suture for adipose/fascial/dermal layer closure: 5-0  monocryl 2 sutures interrupted    Suture used to approximate epidermis: 5-0  monocryl 6sutures interrupted    Final wound length: 3 1 cm    Follow-up in: 7 days  Patient tolerated procedure well without complications  Procedure: Curettage & Electrodessication invasive squamous cell carcinoma right shin basal cell carcinoma left shin and basal cell carcinoma left  The reasons for the procedure were explained to the patient  The benefits and risks of the procedure were explained to the patient, including bleeding, infection, incomplete removal, prolonged anesthesia (weeks to months) and rarely nerve damage  The patient is aware that a scar will result from the procedure  The consent for the procedure was obtained verbally and in writing  Lesion Site:  Right shin Curetted Area Size (mm): 4                       Left kasper                                                23                       Left back                                              24  Using a sterile curette, the appropriate area was curetted  The area was curetted and electrodesiccated x3  Final defect size: 5/26/26mm respectively    The wound was left to heal by secondary intention  The wound was cleansed then covered with a dressing  Wound care instructions were verbally given and in writing  I performed the entire procedure  Patient tolerated procedure well  Assessment:     1  Squamous cell carcinoma of forehead     2  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     3   Basal cell carcinoma (BCC) of left lower leg     4  Basal cell carcinoma (BCC) of back           Plan:   Wound care instructions given to patient        Prior to Admission medications    Medication Sig Start Date End Date Taking? Authorizing Provider   cyanocobalamin (VITAMIN B-12) 1,000 mcg tablet Take by mouth daily   Yes Historical Provider, MD   ezetimibe (ZETIA) 10 mg tablet Take 10 mg by mouth daily  Yes Historical Provider, MD   fluocinonide (LIDEX) 0 05 % cream APPLY TOPICALLY 2 (TWO) TIMES A DAY TO POISON JAQUAN UNTIL CLEAR 8/2/19  Yes Historical Provider, MD   fluticasone-salmeterol (ADVAIR) 250-50 mcg/dose inhaler Inhale 1 puff every 12 (twelve) hours  Yes Historical Provider, MD   Multiple Vitamin (MULTIVITAMIN) capsule Take 1 capsule by mouth daily   Yes Historical Provider, MD   PREVIDENT 5000 DRY MOUTH 1 1 % GEL BRUSH IN PLACE OF YOUR REGULAR TOOTHPASTE AT NIGHT  5/15/19  Yes Historical Provider, MD   rosuvastatin (CRESTOR) 10 MG tablet Take 10 mg by mouth daily  Yes Historical Provider, MD     No Known Allergies    Nohemi Green MD  3/18/2020,12:08 PM    Portions of the record may have been created with voice recognition software   Occasional wrong word or "sound a like" substitutions may have occurred due to the inherent limitations of voice recognition software   Read the chart carefully and recognize, using context, where substitutions have occurred

## 2020-03-25 ENCOUNTER — OFFICE VISIT (OUTPATIENT)
Dept: DERMATOLOGY | Facility: CLINIC | Age: 75
End: 2020-03-25

## 2020-03-25 VITALS — TEMPERATURE: 98.3 F

## 2020-03-25 DIAGNOSIS — C44.519 BASAL CELL CARCINOMA (BCC) OF BACK: ICD-10-CM

## 2020-03-25 DIAGNOSIS — C44.329 SQUAMOUS CELL CARCINOMA OF FOREHEAD: Primary | ICD-10-CM

## 2020-03-25 DIAGNOSIS — C44.719 BASAL CELL CARCINOMA (BCC) OF LEFT LOWER LEG: ICD-10-CM

## 2020-03-25 DIAGNOSIS — C44.722 SQUAMOUS CELL CARCINOMA OF RIGHT LOWER LEG: ICD-10-CM

## 2020-03-25 PROCEDURE — 1160F RVW MEDS BY RX/DR IN RCRD: CPT | Performed by: DERMATOLOGY

## 2020-03-25 PROCEDURE — 99024 POSTOP FOLLOW-UP VISIT: CPT | Performed by: DERMATOLOGY

## 2020-03-25 NOTE — PROGRESS NOTES
[address]  [facility]  [place] [address]  [phone]  [phone]     MRN: [id number] : 1945  Encounter: [id number]  Patient Information: Eloina Rosa    Subjective:     39-year-old male presents for previously excised squamous cell carcinoma right forehead and suture removal also to recheck the wounds on both his shins and back from curettage     Objective:   Temp 98 3 °F (36 8 °C) (Tympanic)     Physical Exam:    General Appearance:    Alert, cooperative, no distress   Skin:  Previous site of excision healing well the other curetted areas are healing no sign of infection  Procedure:  Suture removal  Site of excision:  Right forehead  Wound was cleansed with saline  Wound is intact  Sutures removed  Steri-Strips applied  Biopsy revealed squamous cell carcinoma margins clear     Assessment:     1  Squamous cell carcinoma of forehead     2  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     3  Basal cell carcinoma (BCC) of left lower leg     4  Basal cell carcinoma (BCC) of back           Plan:   Excision site healing well no further treatment needed continue treatment for the other lesions until we see patient back in another month      Prior to Admission medications    Medication Sig Start Date End Date Taking? Authorizing Provider   cyanocobalamin (VITAMIN B-12) 1,000 mcg tablet Take by mouth daily   Yes Historical Provider, MD   ezetimibe (ZETIA) 10 mg tablet Take 10 mg by mouth daily  Yes Historical Provider, MD   fluocinonide (LIDEX) 0 05 % cream APPLY TOPICALLY 2 (TWO) TIMES A DAY TO POISON JAQUAN UNTIL CLEAR 19  Yes Historical Provider, MD   fluticasone-salmeterol (ADVAIR) 250-50 mcg/dose inhaler Inhale 1 puff every 12 (twelve) hours     Yes Historical Provider, MD   Multiple Vitamin (MULTIVITAMIN) capsule Take 1 capsule by mouth daily   Yes Historical Provider, MD   PREVIDENT 5000 DRY MOUTH 1 1 % GEL BRUSH IN PLACE OF YOUR REGULAR TOOTHPASTE AT NIGHT  5/15/19  Yes Historical Provider, MD   rosuvastatin (CRESTOR) 10 MG tablet Take 10 mg by mouth daily  Yes Historical Provider, MD     No Known Allergies    Giovanni Laboy MD  3/25/2020,10:47 AM    Portions of the record may have been created with voice recognition software   Occasional wrong word or "sound a like" substitutions may have occurred due to the inherent limitations of voice recognition software   Read the chart carefully and recognize, using context, where substitutions have occurred

## 2020-03-25 NOTE — PATIENT INSTRUCTIONS
Excision site healing well no further treatment needed continue treatment for the other lesions until we see patient back in another month  STERI-STRIPS (BUTTERFLY) POST SURGERY        Steri-Strips have been placed over your incision site to give added support  Please continue to bathe the area as usual     Eventually the Steri-Strips will begin to peel off on the ends  Snip the raised ends off with scissors and let the rest fall off on their own  If you have any questions, please call our office   [phone]

## 2020-03-30 ENCOUNTER — OFFICE VISIT (OUTPATIENT)
Dept: FAMILY MEDICINE CLINIC | Facility: MEDICAL CENTER | Age: 75
End: 2020-03-30
Payer: MEDICARE

## 2020-03-30 DIAGNOSIS — E78.2 MIXED HYPERLIPIDEMIA: ICD-10-CM

## 2020-03-30 DIAGNOSIS — J45.40 MODERATE PERSISTENT ASTHMA WITHOUT COMPLICATION: ICD-10-CM

## 2020-03-30 DIAGNOSIS — Z00.00 PREVENTATIVE HEALTH CARE: Primary | ICD-10-CM

## 2020-03-30 DIAGNOSIS — Z12.11 SCREENING FOR MALIGNANT NEOPLASM OF COLON: ICD-10-CM

## 2020-03-30 PROCEDURE — 99214 OFFICE O/P EST MOD 30 MIN: CPT | Performed by: FAMILY MEDICINE

## 2020-03-30 PROCEDURE — G0439 PPPS, SUBSEQ VISIT: HCPCS | Performed by: FAMILY MEDICINE

## 2020-03-30 PROCEDURE — 1036F TOBACCO NON-USER: CPT | Performed by: FAMILY MEDICINE

## 2020-03-30 RX ORDER — EZETIMIBE 10 MG/1
10 TABLET ORAL DAILY
Qty: 90 TABLET | Refills: 3 | Status: SHIPPED | OUTPATIENT
Start: 2020-03-30 | End: 2021-03-24

## 2020-03-30 RX ORDER — ROSUVASTATIN CALCIUM 10 MG/1
10 TABLET, COATED ORAL DAILY
Qty: 90 TABLET | Refills: 3 | Status: SHIPPED | OUTPATIENT
Start: 2020-03-30 | End: 2021-03-24

## 2020-04-07 DIAGNOSIS — J45.40 MODERATE PERSISTENT ASTHMA WITHOUT COMPLICATION: ICD-10-CM

## 2020-05-13 ENCOUNTER — TELEMEDICINE (OUTPATIENT)
Dept: DERMATOLOGY | Facility: CLINIC | Age: 75
End: 2020-05-13
Payer: MEDICARE

## 2020-05-13 DIAGNOSIS — C44.722 SQUAMOUS CELL CARCINOMA OF RIGHT LOWER LEG: Primary | ICD-10-CM

## 2020-05-13 DIAGNOSIS — C44.719 BASAL CELL CARCINOMA (BCC) OF LEFT LOWER LEG: ICD-10-CM

## 2020-05-13 DIAGNOSIS — C44.519 BASAL CELL CARCINOMA (BCC) OF BACK: ICD-10-CM

## 2020-05-13 PROCEDURE — 99213 OFFICE O/P EST LOW 20 MIN: CPT | Performed by: DERMATOLOGY

## 2020-06-09 ENCOUNTER — TELEPHONE (OUTPATIENT)
Dept: DERMATOLOGY | Facility: CLINIC | Age: 75
End: 2020-06-09

## 2020-06-09 ENCOUNTER — OFFICE VISIT (OUTPATIENT)
Dept: DERMATOLOGY | Facility: CLINIC | Age: 75
End: 2020-06-09
Payer: MEDICARE

## 2020-06-09 VITALS — TEMPERATURE: 99.5 F

## 2020-06-09 DIAGNOSIS — Z13.89 SCREENING FOR SKIN CONDITION: ICD-10-CM

## 2020-06-09 DIAGNOSIS — Z85.828 HISTORY OF SKIN CANCER: ICD-10-CM

## 2020-06-09 DIAGNOSIS — L82.1 SEBORRHEIC KERATOSIS: ICD-10-CM

## 2020-06-09 DIAGNOSIS — D69.2 SOLAR PURPURA (HCC): Primary | ICD-10-CM

## 2020-06-09 PROCEDURE — 99214 OFFICE O/P EST MOD 30 MIN: CPT | Performed by: DERMATOLOGY

## 2020-06-09 PROCEDURE — 1160F RVW MEDS BY RX/DR IN RCRD: CPT | Performed by: DERMATOLOGY

## 2020-06-09 PROCEDURE — 1036F TOBACCO NON-USER: CPT | Performed by: DERMATOLOGY

## 2020-09-28 NOTE — RESULT NOTES
2120 - Patient much more lethargic when came in to give medications. Would open eyes but couldn't keep them open. All vital signs stable.     2127 - Talked with Dr. Calle, he said to get ABG and BMP stat. Orders placed.    2245 - Talked with Dr. Calle about BMP and ABG results. He asked to have the ICU MD come see him.    2250 - Talked with Dr. Negron, he said he will come and look at patient.    2315 - Dr. Negron came to see patient with alert CASIMIRO Corea. Gave a dose of narcan, patient slightly more alert after dose. Dr. Negron also ordered repeat BMP, stat chest xray, and to hold all medications that can affect LOC. Also notified Dr. Negron of elevated temp. Orders entered.    0025 - Dr. Negron notified of lab results and completed chest xray. No new orders. Will continue to monitor     0315 - temp increased to 101.3, gave ice bath and placed ice packs. Patient too lethargic still to swallow medications.     0400 - temp continues to be 101.3. Talked with Dr. Calle he said ok to order tylenol suppository. Also asked for blood cultures. Orders carried out. Asked if Dr. Calle wanted ammonia level for the lethargy or KUB since patient's abdomen is very distended but he declined at this time. Alert CASIMIRO Corea updated on plan of care. Also ordered urine culture as well.    0550 - morning labs: procalcitonin 7.70, WBC 16.2, and creatinine 1.26. Talked with Dr. Calle, started zosyn Q8 hours. Updated Alert CASIMIRO Corea as well.    Verified Results  (1) TISSUE EXAM [id number] 11:02AM Sendy Ashby     Test Name Result Flag Reference   LAB AP CASE REPORT (Report)     Surgical Pathology Report             Case: [id number]                   Authorizing Provider: Cristy Bateman MD     Collected:      07/27/2017           Pathologist:      Maeve Richmond MD      Received:      07/28/2017 1314        Specimens:  A) - Skin, Other, Right Forehead                                    B) - Skin, Other, Left Forearm                                     C) - Skin, Other, Left Hand   LAB AP FINAL DIAGNOSIS (Report)     A  Skin, Right Forehead, shave biopsy:  - Few detached atypical squamous nests suspicious for early superficially   invasive    squamous cell carcinoma arising in a predominant proliferative and   acantholytic    actinic keratosis with adnexal extension and extending to peripheral   border and    base of biopsy  B  Skin, Left Forearm, shave biopsy:  - 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,    present at peripheral border and base of biopsy   - No invasive carcinoma identified  C  Skin, Left Hand, shave biopsy:  - Superficial/multicentric basal cell carcinoma, extending to peripheral   border and    base of biopsy  Interpretation performed at [facility], [address] [place] [address]  Electronically signed by Maeve Richmond MD on 8/1/2017 at 11:02 AM   LAB AP SURGICAL ADDITIONAL INFORMATION (Report)     All controls performed with the immunohistochemical stains reported above   reacted appropriately  These tests were developed and their performance   characteristics determined by [facility] or   [facility]  They may not be cleared or approved by the [place]  Food and Drug Administration  The FDA has determined that such clearance   or approval is not necessary  These tests are used for clinical purposes     They should not be regarded as investigational or for research  This   laboratory has been approved by [facility], designated as a high-complexity   laboratory and is qualified to perform these tests  LAB AP GROSS DESCRIPTION (Report)     A  The specimen is received in formalin, labeled with the patient's name   and hospital number, and is designated right forehead shave  The   specimen consists of one tan focally hairbearing shave of skin which   measures 0 5 x 0 5 x 0 1 cm  The surface exhibits a partially keratotic   and pigmented macule which measures 0 4 x 0 3 cm and comes within 0 1 cm   of nearest peripheral margin  The margin of resection is inked green, and   the surface tips are inked red  Entirely submitted  One cassette,   bisected  B  The specimen is received in formalin, labeled with the patient's name   and hospital number, and is designated left forearm shave  The specimen   consists of one tan focally hairbearing, keratotic skin papule which   measures 0 7 x 0 5 x 0 2 cm  The margin of resection is inked green, and   the surface tips are inked red  Entirely submitted  One cassette, bisected   lengthwise  C  The specimen is received in formalin, labeled with the patient's name   and hospital number, and is designated left hand shave  The specimen   consists of one tan non-hairbearing shave of skin which measures 0 8 x 0 5   x 0 1 cm  The apparent surface is partially keratotic  The margin of   resection is inked green, and the surface tips are inked red  Please note;   the specimen was found lodged in the lid of the container, and not fully   submerged in formalin  Entirely submitted  One cassette, bisected  Note: The estimated total formalin fixation time based upon information   provided by the submitting clinician and the standard processing schedule   is over 72 hours   MAS   LAB AP CLINICAL INFORMATION R/O SCC x 3

## 2020-10-27 ENCOUNTER — OFFICE VISIT (OUTPATIENT)
Dept: DERMATOLOGY | Facility: CLINIC | Age: 75
End: 2020-10-27
Payer: MEDICARE

## 2020-10-27 VITALS — TEMPERATURE: 97.5 F

## 2020-10-27 DIAGNOSIS — Z13.89 SCREENING FOR SKIN CONDITION: ICD-10-CM

## 2020-10-27 DIAGNOSIS — L98.9 UNKNOWN SKIN LESION: Primary | ICD-10-CM

## 2020-10-27 DIAGNOSIS — L82.1 SEBORRHEIC KERATOSIS: ICD-10-CM

## 2020-10-27 DIAGNOSIS — L57.0 ACTINIC KERATOSIS: ICD-10-CM

## 2020-10-27 DIAGNOSIS — Z85.828 HISTORY OF SKIN CANCER: ICD-10-CM

## 2020-10-27 PROCEDURE — 88305 TISSUE EXAM BY PATHOLOGIST: CPT | Performed by: STUDENT IN AN ORGANIZED HEALTH CARE EDUCATION/TRAINING PROGRAM

## 2020-10-27 PROCEDURE — 11102 TANGNTL BX SKIN SINGLE LES: CPT | Performed by: DERMATOLOGY

## 2020-10-27 PROCEDURE — 99213 OFFICE O/P EST LOW 20 MIN: CPT | Performed by: DERMATOLOGY

## 2020-10-27 PROCEDURE — 17003 DESTRUCT PREMALG LES 2-14: CPT | Performed by: DERMATOLOGY

## 2020-10-27 PROCEDURE — 17000 DESTRUCT PREMALG LESION: CPT | Performed by: DERMATOLOGY

## 2020-11-05 ENCOUNTER — PROCEDURE VISIT (OUTPATIENT)
Dept: DERMATOLOGY | Facility: CLINIC | Age: 75
End: 2020-11-05
Payer: MEDICARE

## 2020-11-05 VITALS — TEMPERATURE: 97.2 F

## 2020-11-05 DIAGNOSIS — D04.62 SQUAMOUS CELL CARCINOMA IN SITU (SCCIS) OF SKIN OF LEFT UPPER ARM: Primary | ICD-10-CM

## 2020-11-05 PROCEDURE — 12032 INTMD RPR S/A/T/EXT 2.6-7.5: CPT | Performed by: DERMATOLOGY

## 2020-11-05 PROCEDURE — 88305 TISSUE EXAM BY PATHOLOGIST: CPT | Performed by: STUDENT IN AN ORGANIZED HEALTH CARE EDUCATION/TRAINING PROGRAM

## 2020-11-05 PROCEDURE — 11602 EXC TR-EXT MAL+MARG 1.1-2 CM: CPT | Performed by: DERMATOLOGY

## 2020-11-12 ENCOUNTER — TELEPHONE (OUTPATIENT)
Dept: DERMATOLOGY | Facility: CLINIC | Age: 75
End: 2020-11-12

## 2020-11-16 ENCOUNTER — OFFICE VISIT (OUTPATIENT)
Dept: DERMATOLOGY | Facility: CLINIC | Age: 75
End: 2020-11-16

## 2020-11-16 VITALS — TEMPERATURE: 98.8 F

## 2020-11-16 DIAGNOSIS — D04.62 SQUAMOUS CELL CARCINOMA IN SITU (SCCIS) OF SKIN OF LEFT UPPER ARM: Primary | ICD-10-CM

## 2020-11-16 PROCEDURE — 99024 POSTOP FOLLOW-UP VISIT: CPT | Performed by: DERMATOLOGY

## 2021-01-23 DIAGNOSIS — Z23 ENCOUNTER FOR IMMUNIZATION: ICD-10-CM

## 2021-01-30 ENCOUNTER — IMMUNIZATIONS (OUTPATIENT)
Dept: FAMILY MEDICINE CLINIC | Facility: HOSPITAL | Age: 76
End: 2021-01-30

## 2021-01-30 DIAGNOSIS — Z23 ENCOUNTER FOR IMMUNIZATION: Primary | ICD-10-CM

## 2021-01-30 PROCEDURE — 91301 SARS-COV-2 / COVID-19 MRNA VACCINE (MODERNA) 100 MCG: CPT

## 2021-01-30 PROCEDURE — 0011A SARS-COV-2 / COVID-19 MRNA VACCINE (MODERNA) 100 MCG: CPT

## 2021-02-25 ENCOUNTER — IMMUNIZATIONS (OUTPATIENT)
Dept: FAMILY MEDICINE CLINIC | Facility: HOSPITAL | Age: 76
End: 2021-02-25

## 2021-02-25 DIAGNOSIS — Z23 ENCOUNTER FOR IMMUNIZATION: Primary | ICD-10-CM

## 2021-02-25 PROCEDURE — 91301 SARS-COV-2 / COVID-19 MRNA VACCINE (MODERNA) 100 MCG: CPT

## 2021-02-25 PROCEDURE — 0012A SARS-COV-2 / COVID-19 MRNA VACCINE (MODERNA) 100 MCG: CPT

## 2021-03-03 ENCOUNTER — OFFICE VISIT (OUTPATIENT)
Dept: DERMATOLOGY | Facility: CLINIC | Age: 76
End: 2021-03-03
Payer: MEDICARE

## 2021-03-03 VITALS — TEMPERATURE: 96.3 F

## 2021-03-03 DIAGNOSIS — Z13.89 SCREENING FOR SKIN CONDITION: ICD-10-CM

## 2021-03-03 DIAGNOSIS — L82.1 SEBORRHEIC KERATOSIS: ICD-10-CM

## 2021-03-03 DIAGNOSIS — Z85.828 HISTORY OF SKIN CANCER: ICD-10-CM

## 2021-03-03 DIAGNOSIS — L57.0 ACTINIC KERATOSIS: Primary | ICD-10-CM

## 2021-03-03 PROCEDURE — 99213 OFFICE O/P EST LOW 20 MIN: CPT | Performed by: DERMATOLOGY

## 2021-03-03 PROCEDURE — 17003 DESTRUCT PREMALG LES 2-14: CPT | Performed by: DERMATOLOGY

## 2021-03-03 PROCEDURE — 17000 DESTRUCT PREMALG LESION: CPT | Performed by: DERMATOLOGY

## 2021-03-03 NOTE — PROGRESS NOTES
[address]  1 [facility] [address] [address]  [phone]  [phone]     MRN: [id number] : 1945  Encounter: [id number]  Patient Information: Yue Veras  Chief complaint: 6 month check up    History of present illness:  34-year-old male with previous history of multiple non melanoma skin cancer presents for overall checkup  Past Medical History:   Diagnosis Date    Asthma     Malignant neoplasm of skin     Nonmelanoma skin cancer     Last assessed 2017      Past Surgical History:   Procedure Laterality Date    CATARACT EXTRACTION      RI REPAIR ING HERNIA,5+Y/O,REDUCIBL Left 2016    Procedure: INGUINAL HERNIA REPAIR ;  Surgeon: Michaela Bowden MD;  [facility];  Service: General     Social History   Social History     Substance and Sexual Activity   Alcohol Use No     Social History     Substance and Sexual Activity   Drug Use Never     Social History     Tobacco Use   Smoking Status Former Smoker   Smokeless Tobacco Former User     Family History   Problem Relation Age of Onset    Coronary artery disease Father     No Known Problems Sister     No Known Problems Brother     Prostate cancer Brother      Meds/Allergies   No Known Allergies    Meds:  Prior to Admission medications    Medication Sig Start Date End Date Taking? Authorizing Provider   cyanocobalamin (VITAMIN B-12) 1,000 mcg tablet Take by mouth daily   Yes Historical Provider, MD   ezetimibe (ZETIA) 10 mg tablet Take 1 tablet (10 mg total) by mouth daily 3/30/20  Yes Carol Ann Grant MD   fluticasone-salmeterol (ADVAIR, [facility]) 250-50 mcg/dose inhaler Inhale 1 puff every 12 (twelve) hours 3/30/20  Yes Carol Ann Grant MD   Multiple Vitamin (MULTIVITAMIN) capsule Take 1 capsule by mouth daily   Yes Historical Provider, MD   PREVIDENT 5000 DRY MOUTH 1 1 % GEL BRUSH IN PLACE OF YOUR REGULAR TOOTHPASTE AT NIGHT   5/15/19  Yes Historical Provider, MD   rosuvastatin (CRESTOR) 10 MG tablet Take 1 tablet (10 mg total) by mouth daily 3/30/20  Yes Sheryl Barajas MD   fluocinonide (LIDEX) 0 05 % cream APPLY TOPICALLY 2 (TWO) TIMES A DAY TO POISON JAQUAN UNTIL CLEAR 8/2/19   Historical Provider, MD   fluticasone-salmeterol (ADVAIR, WIXELA) 250-50 mcg/dose inhaler Inhale 1 puff 2 (two) times a day Rinse mouth after use    Patient not taking: Reported on 3/3/2021 4/8/20   Sheryl Barajas MD       Subjective:     Review of Systems:    General: negative for - chills, fatigue, fever,  weight gain or weight loss  Psychological: negative for - anxiety, behavioral disorder, concentration difficulties, decreased libido, depression, irritability, memory difficulties, mood swings, sleep disturbances or suicidal ideation  ENT: negative for - hearing difficulties , nasal congestion, nasal discharge, oral lesions, sinus pain, sneezing, sore throat  Allergy and Immunology: negative for - hives, insect bite sensitivity,  Hematological and Lymphatic: negative for - bleeding problems, blood clots,bruising, swollen lymph nodes  Endocrine: negative for - hair pattern changes, hot flashes, malaise/lethargy, mood swings, palpitations, polydipsia/polyuria, skin changes, temperature intolerance or unexpected weight change  Respiratory: negative for - cough, hemoptysis, orthopnea, shortness of breath, or wheezing  Cardiovascular: negative for - chest pain, dyspnea on exertion, edema,  Gastrointestinal: negative for - abdominal pain, nausea/vomiting  Genito-Urinary: negative for - dysuria, incontinence, irregular/heavy menses or urinary frequency/urgency  Musculoskeletal: negative for - gait disturbance, joint pain, joint stiffness, joint swelling, muscle pain, muscular weakness  Dermatological:  As in HPI  Neurological: negative for confusion, dizziness, headaches, impaired coordination/balance, memory loss, numbness/tingling, seizures, speech problems, tremors or weakness       Objective:   Temp (!) 96 3 °F (35 7 °C)     Physical Exam:    General Appearance:    Alert, cooperative, no distress   Head:    Normocephalic, without obvious abnormality, atraumatic           Skin:   A full skin exam was performed including scalp, head scalp, eyes, ears, nose, lips, neck, chest, axilla, abdomen, back, buttocks, bilateral upper extremities, bilateral lower extremities, hands, feet, fingers, toes, fingernails, and toenails previous sites skin cancer well healed without recurrence numerous scaling erythematous keratotic areas some slightly indurated noted on the dorsum of the arms hands and upper back area no distinct lesion that looks definitively atypical normal keratotic papules with greasy stuck on appearance nothing else of concern noted  Physical Exam  Constitutional:             Cryotherapy Procedure Note    Pre-operative Diagnosis: actinic keratosis    Plan:  1  Instructed to keep the area dry and clean thereafter  Apply petrolatum if area gets crusty  2  Recommended that the patient use acetaminophen  as needed for pain  Locations:  Dorsum of the arms chest and left upper back    Indications: Destruction of actinic keratoses times 11  Patient informed of risks (permanent scarring, infection, light or dark discoloration, bleeding, infection, weakness, numbness and recurrence of the lesion) and benefits of the procedure and verbal informed consent obtained  The areas are treated with liquid nitrogen therapy, frozen until ice ball extended 2 mm beyond lesion, allowed to thaw, and treated again  The patient tolerated procedure well  The patient was instructed on post-op care, warned that there may be blister formation, redness and pain  Recommend OTC analgesia as needed for pain  Condition:  Stable    Complications:  none  Assessment:     1  Actinic keratosis     2  Seborrheic keratosis     3  Screening for skin condition     4   History of skin cancer           Plan:   Actinic Keratosis:  Patient advised lesions are precancers  These should resolve with cryosurgery if not to let us know sun block recommended  Seborrheic keratosis patient reassured these are normal growths we acquire with age no treatment needed  History of skin cancer in no recurrence nothing else atypical sunblock recommended follow-up in 6 months  Screening for dermatologic disorders nothing else of concern noted on complete exam follow-up in 6 months    Oneyda Doyle MD  3/3/2021,2:17 PM    Portions of the record may have been created with voice recognition software   Occasional wrong word or "sound a like" substitutions may have occurred due to the inherent limitations of voice recognition software   Read the chart carefully and recognize, using context, where substitutions have occurred

## 2021-03-03 NOTE — PATIENT INSTRUCTIONS
Actinic Keratosis:  Patient advised lesions are precancers  These should resolve with cryosurgery if not to let us know sun block recommended  Seborrheic keratosis patient reassured these are normal growths we acquire with age no treatment needed  History of skin cancer in no recurrence nothing else atypical sunblock recommended follow-up in 6 months  Screening for dermatologic disorders nothing else of concern noted on complete exam follow-up in 6 months  Treatment with Cryotherapy    The doctor has treated your skin with nitrogen, which is 320 degrees Fahrenheit below zero  He has given the treated area "frostbite "    Stinging should subside within a few hours  You can take Tylenol for pain, if needed  Over the next few days, it is normal if the area becomes reddened, a blood blister, or swollen with fluid  If the lesion treated was near the eye - you could get a swollen eye over the next few days  Do not panic! This is all temporary, and will resolve with time  There is no special treatment - just keep the area clean  Makeup and BandAids can be used, if preferred  When the area starts to dry up and peel off, using Vaseline can help healing  It usually takes up to a month for it to heal   Some lesions are recurrent and may require repeat treatments  If a lesion has not healed in one month, please don't hesitate to contact us  If you have any further questions that are not answered here, please call us  [phone]    Thank you for allowing us to care for you

## 2021-03-24 DIAGNOSIS — Z12.5 PROSTATE CANCER SCREENING: ICD-10-CM

## 2021-03-24 DIAGNOSIS — R31.21 ASYMPTOMATIC MICROSCOPIC HEMATURIA: ICD-10-CM

## 2021-03-24 DIAGNOSIS — E78.2 MIXED HYPERLIPIDEMIA: ICD-10-CM

## 2021-03-24 DIAGNOSIS — Z12.5 SCREENING FOR PROSTATE CANCER: Primary | ICD-10-CM

## 2021-03-24 DIAGNOSIS — Z00.00 PREVENTATIVE HEALTH CARE: ICD-10-CM

## 2021-03-24 DIAGNOSIS — Z00.00 HEALTHCARE MAINTENANCE: ICD-10-CM

## 2021-03-24 DIAGNOSIS — Z84.2 FAMILY HISTORY OF PROSTATE PROBLEMS: ICD-10-CM

## 2021-03-24 DIAGNOSIS — Z13.1 SCREENING FOR DIABETES MELLITUS: ICD-10-CM

## 2021-03-24 RX ORDER — ROSUVASTATIN CALCIUM 10 MG/1
TABLET, COATED ORAL
Qty: 90 TABLET | Refills: 1 | Status: SHIPPED | OUTPATIENT
Start: 2021-03-24 | End: 2021-08-13

## 2021-03-24 RX ORDER — EZETIMIBE 10 MG/1
TABLET ORAL
Qty: 90 TABLET | Refills: 1 | Status: SHIPPED | OUTPATIENT
Start: 2021-03-24 | End: 2021-08-13

## 2021-03-25 NOTE — TELEPHONE ENCOUNTER
Per SB--I sent in his Rx but he is due for an appt and BW  Needs CBC, CMP,LP  TSh, PSA   Can make with Dr Hollis Pu

## 2021-03-29 ENCOUNTER — APPOINTMENT (OUTPATIENT)
Dept: LAB | Facility: MEDICAL CENTER | Age: 76
End: 2021-03-29
Payer: MEDICARE

## 2021-03-29 LAB
ALBUMIN SERPL BCP-MCNC: 4 G/DL (ref 3.5–5)
ALP SERPL-CCNC: 55 U/L (ref 46–116)
ALT SERPL W P-5'-P-CCNC: 43 U/L (ref 12–78)
ANION GAP SERPL CALCULATED.3IONS-SCNC: 5 MMOL/L (ref 4–13)
AST SERPL W P-5'-P-CCNC: 26 U/L (ref 5–45)
BASOPHILS # BLD AUTO: 0.1 THOUSANDS/ΜL (ref 0–0.1)
BASOPHILS NFR BLD AUTO: 2 % (ref 0–1)
BILIRUB SERPL-MCNC: 0.83 MG/DL (ref 0.2–1)
BUN SERPL-MCNC: 10 MG/DL (ref 5–25)
CALCIUM SERPL-MCNC: 9.3 MG/DL (ref 8.3–10.1)
CHLORIDE SERPL-SCNC: 104 MMOL/L (ref 100–108)
CHOLEST SERPL-MCNC: 166 MG/DL (ref 50–200)
CO2 SERPL-SCNC: 28 MMOL/L (ref 21–32)
CREAT SERPL-MCNC: 0.86 MG/DL (ref 0.6–1.3)
EOSINOPHIL # BLD AUTO: 0.48 THOUSAND/ΜL (ref 0–0.61)
EOSINOPHIL NFR BLD AUTO: 8 % (ref 0–6)
ERYTHROCYTE [DISTWIDTH] IN BLOOD BY AUTOMATED COUNT: 12.5 % (ref 11.6–15.1)
GFR SERPL CREATININE-BSD FRML MDRD: 84 ML/MIN/1.73SQ M
GLUCOSE P FAST SERPL-MCNC: 78 MG/DL (ref 65–99)
HCT VFR BLD AUTO: 46.3 % (ref 36.5–49.3)
HDLC SERPL-MCNC: 55 MG/DL
HGB BLD-MCNC: 15 G/DL (ref 12–17)
IMM GRANULOCYTES # BLD AUTO: 0.02 THOUSAND/UL (ref 0–0.2)
IMM GRANULOCYTES NFR BLD AUTO: 0 % (ref 0–2)
LDLC SERPL CALC-MCNC: 94 MG/DL (ref 0–100)
LYMPHOCYTES # BLD AUTO: 1.88 THOUSANDS/ΜL (ref 0.6–4.47)
LYMPHOCYTES NFR BLD AUTO: 29 % (ref 14–44)
MCH RBC QN AUTO: 31.4 PG (ref 26.8–34.3)
MCHC RBC AUTO-ENTMCNC: 32.4 G/DL (ref 31.4–37.4)
MCV RBC AUTO: 97 FL (ref 82–98)
MONOCYTES # BLD AUTO: 0.51 THOUSAND/ΜL (ref 0.17–1.22)
MONOCYTES NFR BLD AUTO: 8 % (ref 4–12)
NEUTROPHILS # BLD AUTO: 3.41 THOUSANDS/ΜL (ref 1.85–7.62)
NEUTS SEG NFR BLD AUTO: 53 % (ref 43–75)
NONHDLC SERPL-MCNC: 111 MG/DL
NRBC BLD AUTO-RTO: 0 /100 WBCS
PLATELET # BLD AUTO: 231 THOUSANDS/UL (ref 149–390)
PMV BLD AUTO: 10.6 FL (ref 8.9–12.7)
POTASSIUM SERPL-SCNC: 4.5 MMOL/L (ref 3.5–5.3)
PROT SERPL-MCNC: 7.8 G/DL (ref 6.4–8.2)
RBC # BLD AUTO: 4.78 MILLION/UL (ref 3.88–5.62)
SODIUM SERPL-SCNC: 137 MMOL/L (ref 136–145)
TRIGL SERPL-MCNC: 83 MG/DL
TSH SERPL DL<=0.05 MIU/L-ACNC: 2.18 UIU/ML (ref 0.36–3.74)
WBC # BLD AUTO: 6.4 THOUSAND/UL (ref 4.31–10.16)

## 2021-03-29 PROCEDURE — 80061 LIPID PANEL: CPT | Performed by: FAMILY MEDICINE

## 2021-03-29 PROCEDURE — 85025 COMPLETE CBC W/AUTO DIFF WBC: CPT | Performed by: FAMILY MEDICINE

## 2021-03-29 PROCEDURE — 36415 COLL VENOUS BLD VENIPUNCTURE: CPT | Performed by: FAMILY MEDICINE

## 2021-03-29 PROCEDURE — 80053 COMPREHEN METABOLIC PANEL: CPT | Performed by: FAMILY MEDICINE

## 2021-03-29 PROCEDURE — 84154 ASSAY OF PSA FREE: CPT | Performed by: FAMILY MEDICINE

## 2021-03-29 PROCEDURE — 84443 ASSAY THYROID STIM HORMONE: CPT | Performed by: FAMILY MEDICINE

## 2021-03-29 PROCEDURE — 84153 ASSAY OF PSA TOTAL: CPT | Performed by: FAMILY MEDICINE

## 2021-04-01 LAB
PSA FREE MFR SERPL: 30 %
PSA FREE SERPL-MCNC: 0.18 NG/ML
PSA SERPL-MCNC: 0.6 NG/ML (ref 0–4)

## 2021-04-07 DIAGNOSIS — J45.40 MODERATE PERSISTENT ASTHMA WITHOUT COMPLICATION: ICD-10-CM

## 2021-04-08 ENCOUNTER — OFFICE VISIT (OUTPATIENT)
Dept: FAMILY MEDICINE CLINIC | Facility: MEDICAL CENTER | Age: 76
End: 2021-04-08
Payer: MEDICARE

## 2021-04-08 VITALS
WEIGHT: 130.6 LBS | DIASTOLIC BLOOD PRESSURE: 68 MMHG | OXYGEN SATURATION: 98 % | HEART RATE: 58 BPM | TEMPERATURE: 98.7 F | BODY MASS INDEX: 22.3 KG/M2 | SYSTOLIC BLOOD PRESSURE: 106 MMHG | HEIGHT: 64 IN

## 2021-04-08 DIAGNOSIS — Z00.00 HEALTHCARE MAINTENANCE: ICD-10-CM

## 2021-04-08 DIAGNOSIS — Z12.5 PROSTATE CANCER SCREENING: ICD-10-CM

## 2021-04-08 DIAGNOSIS — E78.2 MIXED HYPERLIPIDEMIA: Primary | ICD-10-CM

## 2021-04-08 DIAGNOSIS — J45.40 MODERATE PERSISTENT ASTHMA WITHOUT COMPLICATION: ICD-10-CM

## 2021-04-08 PROCEDURE — 99214 OFFICE O/P EST MOD 30 MIN: CPT | Performed by: FAMILY MEDICINE

## 2021-04-08 PROCEDURE — 1124F ACP DISCUSS-NO DSCNMKR DOCD: CPT | Performed by: FAMILY MEDICINE

## 2021-04-08 NOTE — PROGRESS NOTES
Ashutosh Biggs is here for 6 month checkup  He has been doing very well overall  He uses Advair daily  No coughing   He has  no shortness of breath, cough, wheeze  He takes long walks and he does not get any shortness of breath at that time  Wife does not hear any coughing  He got his COVID vaccination  No problems  He continues to take his Zetia and Crestor 10 milligrams  No side effects  His diet is healthy  He exercises with walks once or twice a day  There is no record of pneumonia shots on the chart although wife believes that he did have them  No complaints  O: /78 (BP Location: Left arm, Patient Position: Sitting, Cuff Size: Adult)   Pulse 58   Temp 98 7 °F (37 1 °C)   Ht 5' 4" (1 626 m)   Wt 59 2 kg (130 lb 9 6 oz)   SpO2 98%   BMI 22 42 kg/m²     BP by me 106/68  Physical Exam  Neck:      Musculoskeletal: Normal range of motion and neck supple  Vascular: No carotid bruit  Comments: No thyromegaly  Cardiovascular:      Rate and Rhythm: Normal rate and regular rhythm  Pulses: Normal pulses  Heart sounds: Normal heart sounds  No murmur  Pulmonary:      Effort: Pulmonary effort is normal       Breath sounds: Normal breath sounds  Lymphadenopathy:      Cervical: No cervical adenopathy  Blood work  03/29/2021   CBC CMP lipid profile TSH PSA normal    Assessment   1  Health maintenance -up-to-date with prostate cancer screening  Colorectal cancer screening previously advised  Had COVID vaccination  Wife  Will check the status of his pneumonia vaccinations  Consider Shingrix  2  Hyperlipidemia -well controlled with Zetia and Crestor; Will continue   3  Asthma- presently asymptomatic  Will reduce his Advair to  Once daily  Consider weaning off entirely  Plan    As above  Recheck 6 months

## 2021-06-03 DIAGNOSIS — J45.40 MODERATE PERSISTENT ASTHMA WITHOUT COMPLICATION: ICD-10-CM

## 2021-08-02 DIAGNOSIS — J45.40 MODERATE PERSISTENT ASTHMA WITHOUT COMPLICATION: ICD-10-CM

## 2021-08-10 DIAGNOSIS — E78.2 MIXED HYPERLIPIDEMIA: ICD-10-CM

## 2021-08-13 RX ORDER — ROSUVASTATIN CALCIUM 10 MG/1
TABLET, COATED ORAL
Qty: 90 TABLET | Refills: 1 | Status: SHIPPED | OUTPATIENT
Start: 2021-08-13 | End: 2022-03-08

## 2021-08-13 RX ORDER — EZETIMIBE 10 MG/1
TABLET ORAL
Qty: 90 TABLET | Refills: 1 | Status: SHIPPED | OUTPATIENT
Start: 2021-08-13 | End: 2022-03-08

## 2021-09-21 ENCOUNTER — OFFICE VISIT (OUTPATIENT)
Dept: DERMATOLOGY | Facility: CLINIC | Age: 76
End: 2021-09-21
Payer: MEDICARE

## 2021-09-21 DIAGNOSIS — L82.1 SEBORRHEIC KERATOSIS: ICD-10-CM

## 2021-09-21 DIAGNOSIS — L57.0 ACTINIC KERATOSIS: Primary | ICD-10-CM

## 2021-09-21 DIAGNOSIS — Z13.89 SCREENING FOR SKIN CONDITION: ICD-10-CM

## 2021-09-21 DIAGNOSIS — Z85.828 HISTORY OF SKIN CANCER: ICD-10-CM

## 2021-09-21 PROCEDURE — 17003 DESTRUCT PREMALG LES 2-14: CPT | Performed by: DERMATOLOGY

## 2021-09-21 PROCEDURE — 99213 OFFICE O/P EST LOW 20 MIN: CPT | Performed by: DERMATOLOGY

## 2021-09-21 PROCEDURE — 17000 DESTRUCT PREMALG LESION: CPT | Performed by: DERMATOLOGY

## 2021-09-21 NOTE — PATIENT INSTRUCTIONS
Actinic Keratosis:  Patient advised lesions are precancers  These should resolve with cryosurgery if not to let us know sun block recommended  Seborrheic keratosis patient reassured these are normal growths we acquire with age no treatment needed  History of skin cancer in no recurrence nothing else atypical sunblock recommended follow-up in 6 months  Screening for dermatologic disorders nothing else of concern noted on complete exam follow-up in 6 months  Treatment with Cryotherapy    The doctor has treated your skin with nitrogen, which is 320 degrees Fahrenheit below zero  He has given the treated area "frostbite "    Stinging should subside within a few hours  You can take Tylenol for pain, if needed  Over the next few days, it is normal if the area becomes reddened, a blood blister, or swollen with fluid  If the lesion treated was near the eye - you could get a swollen eye over the next few days  Do not panic! This is all temporary, and will resolve with time  There is no special treatment - just keep the area clean  Makeup and BandAids can be used, if preferred  When the area starts to dry up and peel off, using Vaseline can help healing  It usually takes up to a month for it to heal   Some lesions are recurrent and may require repeat treatments  If a lesion has not healed in one month, please don't hesitate to contact us  If you have any further questions that are not answered here, please call us  [phone]    Thank you for allowing us to care for you

## 2021-09-21 NOTE — PROGRESS NOTES
[address]  1 [facility] [address]  [phone]  [phone]     MRN: [id number] : 1945  Encounter: [id number]  Patient Information: Mary Shafer  Chief complaint: 6 month check up    History of present illness:  50-year-old male with previous history actinic keratosis and nonmelanoma skin cancer presents for overall checkup no complaints noted  Past Medical History:   Diagnosis Date    Asthma     Malignant neoplasm of skin     Nonmelanoma skin cancer     Last assessed 2017      Past Surgical History:   Procedure Laterality Date    CATARACT EXTRACTION      EYE SURGERY      Cataracts    HERNIA REPAIR      [place] REPAIR ING HERNIA,5+Y/O,REDUCIBL Left 2016    Procedure: INGUINAL HERNIA REPAIR ;  Surgeon: Renay Kim MD;  [facility];  Service: General     Social History   Social History     Substance and Sexual Activity   Alcohol Use No     Social History     Substance and Sexual Activity   Drug Use No     Social History     Tobacco Use   Smoking Status Former Smoker    Packs/day: 0 00    Years: 0 00    Pack years: 0 00   Smokeless Tobacco Never Used   Tobacco Comment    [employer]     Family History   Problem Relation Age of Onset    Coronary artery disease Father     No Known Problems Sister     No Known Problems Brother     Prostate cancer Brother      Meds/Allergies   No Known Allergies    Meds:  Prior to Admission medications    Medication Sig Start Date End Date Taking?  Authorizing Provider   Advair Diskus 250-50 MCG/DOSE inhaler INHALE 1 PUFF 2 (TWO) TIMES A DAY RINSE MOUTH AFTER USE 8/3/21   Tangela Bowser MD   cyanocobalamin (VITAMIN B-12) 1,000 mcg tablet Take by mouth daily    Historical Provider, MD   ezetimibe (ZETIA) 10 mg tablet TAKE 1 TABLET BY MOUTH EVERY DAY 21   Tangela Bowser MD   fluocinonide (LIDEX) 0 05 % cream APPLY TOPICALLY 2 (TWO) TIMES A DAY TO POISON IVY UNTIL CLEAR 8/2/19   Historical Provider, MD   Multiple Vitamin (MULTIVITAMIN) capsule Take 1 capsule by mouth daily    Historical Provider, MD   PREVIDENT 5000 DRY MOUTH 1 1 % GEL BRUSH IN PLACE OF YOUR REGULAR TOOTHPASTE AT NIGHT  5/15/19   Historical Provider, MD   rosuvastatin (CRESTOR) 10 MG tablet TAKE 1 TABLET BY MOUTH EVERY DAY 8/13/21   Suellen Parikh MD       Subjective:     Review of Systems:    General: negative for - chills, fatigue, fever,  weight gain or weight loss  Psychological: negative for - anxiety, behavioral disorder, concentration difficulties, decreased libido, depression, irritability, memory difficulties, mood swings, sleep disturbances or suicidal ideation  ENT: negative for - hearing difficulties , nasal congestion, nasal discharge, oral lesions, sinus pain, sneezing, sore throat  Allergy and Immunology: negative for - hives, insect bite sensitivity,  Hematological and Lymphatic: negative for - bleeding problems, blood clots,bruising, swollen lymph nodes  Endocrine: negative for - hair pattern changes, hot flashes, malaise/lethargy, mood swings, palpitations, polydipsia/polyuria, skin changes, temperature intolerance or unexpected weight change  Respiratory: negative for - cough, hemoptysis, orthopnea, shortness of breath, or wheezing  Cardiovascular: negative for - chest pain, dyspnea on exertion, edema,  Gastrointestinal: negative for - abdominal pain, nausea/vomiting  Genito-Urinary: negative for - dysuria, incontinence, irregular/heavy menses or urinary frequency/urgency  Musculoskeletal: negative for - gait disturbance, joint pain, joint stiffness, joint swelling, muscle pain, muscular weakness  Dermatological:  As in HPI  Neurological: negative for confusion, dizziness, headaches, impaired coordination/balance, memory loss, numbness/tingling, seizures, speech problems, tremors or weakness       Objective: There were no vitals taken for this visit      Physical Exam:    General Appearance: Alert, cooperative, no distress   Head:    Normocephalic, without obvious abnormality, atraumatic           Skin:   A full skin exam was performed including scalp, head scalp, eyes, ears, nose, lips, neck, chest, axilla, abdomen, back, buttocks, bilateral upper extremities, bilateral lower extremities, hands, feet, fingers, toes, fingernails, and toenails previous sites skin cancer well healed without recurrence scaling erythematous areas noted below normal keratotic papules with greasy stuck appearance nothing else remarkable noted on complete exam  Physical Exam  Constitutional:        HENT:      Head:       Cryotherapy Procedure Note    Pre-operative Diagnosis: actinic keratosis    Plan:  1  Instructed to keep the area dry and clean thereafter  Apply petrolatum if area gets crusty  2  Recommended that the patient use acetaminophen  as needed for pain  Locations: The left side of face dorsum of the hands and arms    Indications: Destruction of actinic keratoses times 13    Patient informed of risks (permanent scarring, infection, light or dark discoloration, bleeding, infection, weakness, numbness and recurrence of the lesion) and benefits of the procedure and verbal informed consent obtained  The areas are treated with liquid nitrogen therapy, frozen until ice ball extended 2 mm beyond lesion, allowed to thaw, and treated again  The patient tolerated procedure well  The patient was instructed on post-op care, warned that there may be blister formation, redness and pain  Recommend OTC analgesia as needed for pain  Condition:  Stable    Complications:  none  Assessment:     1  Actinic keratosis     2  Seborrheic keratosis     3  History of skin cancer     4  Screening for skin condition           Plan:   Actinic Keratosis:  Patient advised lesions are precancers  These should resolve with cryosurgery if not to let us know sun block recommended    Seborrheic keratosis patient reassured these are normal growths we acquire with age no treatment needed  History of skin cancer in no recurrence nothing else atypical sunblock recommended follow-up in 6 months  Screening for dermatologic disorders nothing else of concern noted on complete exam follow-up in 6 months    Maricruz Burnham MD  9/21/2021,2:40 PM    Portions of the record may have been created with voice recognition software   Occasional wrong word or "sound a like" substitutions may have occurred due to the inherent limitations of voice recognition software   Read the chart carefully and recognize, using context, where substitutions have occurred

## 2021-10-04 DIAGNOSIS — J45.40 MODERATE PERSISTENT ASTHMA WITHOUT COMPLICATION: ICD-10-CM

## 2021-10-29 ENCOUNTER — IMMUNIZATIONS (OUTPATIENT)
Dept: FAMILY MEDICINE CLINIC | Facility: HOSPITAL | Age: 76
End: 2021-10-29

## 2021-10-29 DIAGNOSIS — Z23 ENCOUNTER FOR IMMUNIZATION: Primary | ICD-10-CM

## 2021-10-29 PROCEDURE — 91300 COVID-19 PFIZER VACC 0.3 ML: CPT

## 2021-10-29 PROCEDURE — 0001A COVID-19 PFIZER VACC 0.3 ML: CPT

## 2021-12-03 DIAGNOSIS — J45.40 MODERATE PERSISTENT ASTHMA WITHOUT COMPLICATION: ICD-10-CM

## 2022-02-01 DIAGNOSIS — J45.40 MODERATE PERSISTENT ASTHMA WITHOUT COMPLICATION: ICD-10-CM

## 2022-02-15 ENCOUNTER — OFFICE VISIT (OUTPATIENT)
Dept: FAMILY MEDICINE CLINIC | Facility: MEDICAL CENTER | Age: 77
End: 2022-02-15
Payer: MEDICARE

## 2022-02-15 VITALS
HEART RATE: 58 BPM | SYSTOLIC BLOOD PRESSURE: 130 MMHG | WEIGHT: 123.6 LBS | RESPIRATION RATE: 16 BRPM | HEIGHT: 64 IN | DIASTOLIC BLOOD PRESSURE: 80 MMHG | BODY MASS INDEX: 21.1 KG/M2 | TEMPERATURE: 97.8 F

## 2022-02-15 DIAGNOSIS — E78.2 MIXED HYPERLIPIDEMIA: Primary | ICD-10-CM

## 2022-02-15 DIAGNOSIS — Z13.1 SCREENING FOR DIABETES MELLITUS: ICD-10-CM

## 2022-02-15 DIAGNOSIS — Z12.5 SCREENING FOR PROSTATE CANCER: ICD-10-CM

## 2022-02-15 DIAGNOSIS — Z13.0 SCREENING FOR IRON DEFICIENCY ANEMIA: ICD-10-CM

## 2022-02-15 DIAGNOSIS — Z13.29 SCREENING FOR THYROID DISORDER: ICD-10-CM

## 2022-02-15 PROCEDURE — 99214 OFFICE O/P EST MOD 30 MIN: CPT | Performed by: FAMILY MEDICINE

## 2022-02-15 NOTE — PROGRESS NOTES
Assessment/Plan: Moderate persistent asthma without complication  Advair was adjusted to qd from b i d  He has not had increased symptoms  He never needs a rescue inhaler  Mixed hyperlipidemia  His last LDL was in March 2021  His LDL was 94  Continue rosuvastatin, continue low-fat diet  Continue exercising  Problem List Items Addressed This Visit        Other    Mixed hyperlipidemia - Primary     His last LDL was in March 2021  His LDL was 94  Continue rosuvastatin, continue low-fat diet  Continue exercising  Relevant Orders    Lipid Panel with Direct LDL reflex    Comprehensive metabolic panel      Other Visit Diagnoses     Screening for iron deficiency anemia        Relevant Orders    CBC and differential    Screening for diabetes mellitus        Relevant Orders    Comprehensive metabolic panel    Screening for thyroid disorder        Relevant Orders    TSH, 3rd generation with Free T4 reflex    Screening for prostate cancer        Relevant Orders    PSA, Total Screen            Subjective:      Patient ID: Natalie Crouch is a 65 y o  male  He lives with his wife  He is a retired maritime   He often takes walk with his wife  He needs a PSA  He is up-to-date with colorectal screening  The following portions of the patient's history were reviewed and updated as appropriate: allergies, current medications, past family history, past medical history, past social history, past surgical history and problem list     Review of Systems   Constitutional: Negative  HENT: Negative  Eyes: Negative  Respiratory: Negative  Cardiovascular: Negative  Gastrointestinal: Negative  Genitourinary: Negative  Musculoskeletal: Negative  Neurological: Negative  Psychiatric/Behavioral: Negative            Objective:      /80 (Cuff Size: Standard)   Pulse 58   Temp 97 8 °F (36 6 °C)   Resp 16   Ht 5' 4" (1 626 m)   Wt 56 1 kg (123 lb 9 6 oz) BMI 21 22 kg/m²          Physical Exam  Vitals and nursing note reviewed  Constitutional:       Appearance: Normal appearance  He is well-developed  HENT:      Head: Normocephalic  Right Ear: External ear normal       Left Ear: External ear normal       Nose: Nose normal    Eyes:      General: Lids are normal       Conjunctiva/sclera: Conjunctivae normal       Pupils: Pupils are equal, round, and reactive to light  Neck:      Thyroid: No thyromegaly  Vascular: No carotid bruit  Cardiovascular:      Rate and Rhythm: Normal rate and regular rhythm  Pulses: Normal pulses  Heart sounds: Normal heart sounds, S1 normal and S2 normal  No murmur heard  Pulmonary:      Effort: Pulmonary effort is normal  No respiratory distress  Breath sounds: Normal breath sounds  No wheezing or rales  Abdominal:      General: Bowel sounds are normal       Palpations: Abdomen is soft  There is no mass  Tenderness: There is no abdominal tenderness  Musculoskeletal:         General: Normal range of motion  Cervical back: Normal range of motion and neck supple  Lymphadenopathy:      Cervical: No cervical adenopathy  Skin:     General: Skin is warm and dry  Coloration: Skin is not pale  Findings: No rash  Neurological:      Mental Status: He is alert and oriented to person, place, and time  Cranial Nerves: No cranial nerve deficit  Sensory: No sensory deficit  Deep Tendon Reflexes: Reflexes are normal and symmetric  Psychiatric:         Behavior: Behavior normal  Behavior is cooperative  Thought Content:  Thought content normal          Judgment: Judgment normal

## 2022-02-15 NOTE — ASSESSMENT & PLAN NOTE
His last LDL was in March 2021  His LDL was 94  Continue rosuvastatin, continue low-fat diet  Continue exercising

## 2022-02-15 NOTE — ASSESSMENT & PLAN NOTE
Advair was adjusted to qd from b i d  He has not had increased symptoms  He never needs a rescue inhaler

## 2022-02-22 ENCOUNTER — APPOINTMENT (OUTPATIENT)
Dept: LAB | Facility: MEDICAL CENTER | Age: 77
End: 2022-02-22
Payer: MEDICARE

## 2022-02-22 DIAGNOSIS — E78.2 MIXED HYPERLIPIDEMIA: ICD-10-CM

## 2022-02-22 DIAGNOSIS — Z13.1 SCREENING FOR DIABETES MELLITUS: ICD-10-CM

## 2022-02-22 DIAGNOSIS — Z13.0 SCREENING FOR IRON DEFICIENCY ANEMIA: ICD-10-CM

## 2022-02-22 DIAGNOSIS — Z13.29 SCREENING FOR THYROID DISORDER: ICD-10-CM

## 2022-02-22 DIAGNOSIS — Z12.5 SCREENING FOR PROSTATE CANCER: ICD-10-CM

## 2022-02-22 LAB
ALBUMIN SERPL BCP-MCNC: 3.6 G/DL (ref 3.5–5)
ALP SERPL-CCNC: 60 U/L (ref 46–116)
ALT SERPL W P-5'-P-CCNC: 29 U/L (ref 12–78)
ANION GAP SERPL CALCULATED.3IONS-SCNC: 4 MMOL/L (ref 4–13)
AST SERPL W P-5'-P-CCNC: 16 U/L (ref 5–45)
BASOPHILS # BLD AUTO: 0.1 THOUSANDS/ΜL (ref 0–0.1)
BASOPHILS NFR BLD AUTO: 2 % (ref 0–1)
BILIRUB SERPL-MCNC: 0.46 MG/DL (ref 0.2–1)
BUN SERPL-MCNC: 7 MG/DL (ref 5–25)
CALCIUM SERPL-MCNC: 9.5 MG/DL (ref 8.3–10.1)
CHLORIDE SERPL-SCNC: 103 MMOL/L (ref 100–108)
CHOLEST SERPL-MCNC: 157 MG/DL
CO2 SERPL-SCNC: 27 MMOL/L (ref 21–32)
CREAT SERPL-MCNC: 0.8 MG/DL (ref 0.6–1.3)
EOSINOPHIL # BLD AUTO: 0.37 THOUSAND/ΜL (ref 0–0.61)
EOSINOPHIL NFR BLD AUTO: 7 % (ref 0–6)
ERYTHROCYTE [DISTWIDTH] IN BLOOD BY AUTOMATED COUNT: 12.4 % (ref 11.6–15.1)
GFR SERPL CREATININE-BSD FRML MDRD: 85 ML/MIN/1.73SQ M
GLUCOSE P FAST SERPL-MCNC: 88 MG/DL (ref 65–99)
HCT VFR BLD AUTO: 44.9 % (ref 36.5–49.3)
HDLC SERPL-MCNC: 51 MG/DL
HGB BLD-MCNC: 14.3 G/DL (ref 12–17)
IMM GRANULOCYTES # BLD AUTO: 0.01 THOUSAND/UL (ref 0–0.2)
IMM GRANULOCYTES NFR BLD AUTO: 0 % (ref 0–2)
LDLC SERPL CALC-MCNC: 90 MG/DL (ref 0–100)
LYMPHOCYTES # BLD AUTO: 1.74 THOUSANDS/ΜL (ref 0.6–4.47)
LYMPHOCYTES NFR BLD AUTO: 31 % (ref 14–44)
MCH RBC QN AUTO: 31 PG (ref 26.8–34.3)
MCHC RBC AUTO-ENTMCNC: 31.8 G/DL (ref 31.4–37.4)
MCV RBC AUTO: 97 FL (ref 82–98)
MONOCYTES # BLD AUTO: 0.62 THOUSAND/ΜL (ref 0.17–1.22)
MONOCYTES NFR BLD AUTO: 11 % (ref 4–12)
NEUTROPHILS # BLD AUTO: 2.7 THOUSANDS/ΜL (ref 1.85–7.62)
NEUTS SEG NFR BLD AUTO: 49 % (ref 43–75)
NRBC BLD AUTO-RTO: 0 /100 WBCS
PLATELET # BLD AUTO: 261 THOUSANDS/UL (ref 149–390)
PMV BLD AUTO: 9.8 FL (ref 8.9–12.7)
POTASSIUM SERPL-SCNC: 4.2 MMOL/L (ref 3.5–5.3)
PROT SERPL-MCNC: 7.2 G/DL (ref 6.4–8.2)
PSA SERPL-MCNC: 0.5 NG/ML (ref 0–4)
RBC # BLD AUTO: 4.62 MILLION/UL (ref 3.88–5.62)
SODIUM SERPL-SCNC: 134 MMOL/L (ref 136–145)
TRIGL SERPL-MCNC: 82 MG/DL
TSH SERPL DL<=0.05 MIU/L-ACNC: 2.55 UIU/ML (ref 0.36–3.74)
WBC # BLD AUTO: 5.54 THOUSAND/UL (ref 4.31–10.16)

## 2022-02-22 PROCEDURE — G0103 PSA SCREENING: HCPCS

## 2022-02-22 PROCEDURE — 84443 ASSAY THYROID STIM HORMONE: CPT

## 2022-02-22 PROCEDURE — 85025 COMPLETE CBC W/AUTO DIFF WBC: CPT

## 2022-02-22 PROCEDURE — 80053 COMPREHEN METABOLIC PANEL: CPT

## 2022-02-22 PROCEDURE — 36415 COLL VENOUS BLD VENIPUNCTURE: CPT

## 2022-02-22 PROCEDURE — 80061 LIPID PANEL: CPT

## 2022-03-08 DIAGNOSIS — E78.2 MIXED HYPERLIPIDEMIA: ICD-10-CM

## 2022-03-08 RX ORDER — EZETIMIBE 10 MG/1
TABLET ORAL
Qty: 90 TABLET | Refills: 1 | Status: SHIPPED | OUTPATIENT
Start: 2022-03-08

## 2022-03-08 RX ORDER — ROSUVASTATIN CALCIUM 10 MG/1
TABLET, COATED ORAL
Qty: 90 TABLET | Refills: 1 | Status: SHIPPED | OUTPATIENT
Start: 2022-03-08

## 2022-04-06 ENCOUNTER — OFFICE VISIT (OUTPATIENT)
Dept: DERMATOLOGY | Facility: CLINIC | Age: 77
End: 2022-04-06
Payer: MEDICARE

## 2022-04-06 VITALS — HEIGHT: 64 IN | TEMPERATURE: 98.1 F | WEIGHT: 123 LBS | BODY MASS INDEX: 21 KG/M2

## 2022-04-06 DIAGNOSIS — J45.40 MODERATE PERSISTENT ASTHMA WITHOUT COMPLICATION: ICD-10-CM

## 2022-04-06 DIAGNOSIS — Z85.828 HISTORY OF SKIN CANCER: ICD-10-CM

## 2022-04-06 DIAGNOSIS — L98.9 UNKNOWN SKIN LESION: Primary | ICD-10-CM

## 2022-04-06 DIAGNOSIS — Z13.89 SCREENING FOR SKIN CONDITION: ICD-10-CM

## 2022-04-06 DIAGNOSIS — L82.1 SEBORRHEIC KERATOSIS: ICD-10-CM

## 2022-04-06 PROCEDURE — 88305 TISSUE EXAM BY PATHOLOGIST: CPT | Performed by: STUDENT IN AN ORGANIZED HEALTH CARE EDUCATION/TRAINING PROGRAM

## 2022-04-06 PROCEDURE — 11103 TANGNTL BX SKIN EA SEP/ADDL: CPT | Performed by: DERMATOLOGY

## 2022-04-06 PROCEDURE — 11102 TANGNTL BX SKIN SINGLE LES: CPT | Performed by: DERMATOLOGY

## 2022-04-06 PROCEDURE — 99214 OFFICE O/P EST MOD 30 MIN: CPT | Performed by: DERMATOLOGY

## 2022-04-06 NOTE — PATIENT INSTRUCTIONS
Skin lesions x2 both appear to be consistent with squamous cell carcinoma await results of biopsy and plan on complete excision  Seborrheic keratosis patient reassured these are normal growths we acquire with age no treatment needed  History of skin cancer in no recurrence nothing else atypical sunblock recommended follow-up in 6 months  Screening for dermatologic disorders nothing else of concern noted on complete exam follow-up in 6 months  Wound care instructions given to patient

## 2022-04-06 NOTE — PROGRESS NOTES
[address]  [address] [facility] [address]  [phone]  [phone]     MRN: [id number] : 1945  Encounter: [id number]  Patient Information: George Portillo  Chief complaint: 6 Month checkup    History of present illness:  65-year-old male with previous history of nonmelanoma skin cancer presents for overall checkup no specific concerns noted except for painful lesion on his back  Past Medical History:   Diagnosis Date    Asthma     Malignant neoplasm of skin     Nonmelanoma skin cancer     Last assessed 2017      Past Surgical History:   Procedure Laterality Date    CATARACT EXTRACTION      EYE SURGERY      Cataracts    HERNIA REPAIR      PA REPAIR ING HERNIA,5+Y/O,REDUCIBL Left 2016    Procedure: INGUINAL HERNIA REPAIR ;  Surgeon: Edison Leiva MD;  Location: [facility];  Service: General     Social History   Social History     Substance and Sexual Activity   Alcohol Use No     Social History     Substance and Sexual Activity   Drug Use No     Social History     Tobacco Use   Smoking Status Former Smoker    Packs/day: 0 00    Years: 0 00    Pack years: 0 00   Smokeless Tobacco Never Used   Tobacco Comment    [employer]     Family History   Problem Relation Age of Onset    Coronary artery disease Father     No Known Problems Sister     No Known Problems Brother     Prostate cancer Brother     No Known Problems Son      Meds/Allergies   No Known Allergies    Meds:  Prior to Admission medications    Medication Sig Start Date End Date Taking?  Authorizing Provider   Advair Diskus 250-50 MCG/DOSE inhaler INHALE 1 PUFF 2 TIMES A DAY RINSE MOUTH AFTER USE  22  Yes Ayaan Obrien MD   cyanocobalamin (VITAMIN B-12) 1,000 mcg tablet Take by mouth daily   Yes Historical Provider, MD   ezetimibe (ZETIA) 10 mg tablet TAKE 1 TABLET BY MOUTH EVERY DAY 3/8/22  Yes Ayaan Obrien MD   fluocinonide (LIDEX) 0 05 % cream APPLY TOPICALLY 2 (TWO) TIMES A DAY TO POISON JAQUAN UNTIL CLEAR 8/2/19  Yes Historical Provider, MD   Multiple Vitamin (MULTIVITAMIN) capsule Take 1 capsule by mouth daily   Yes Historical Provider, MD   PREVIDENT 5000 DRY MOUTH 1 1 % GEL BRUSH IN PLACE OF YOUR REGULAR TOOTHPASTE AT NIGHT   5/15/19  Yes Historical Provider, MD   rosuvastatin (CRESTOR) 10 MG tablet TAKE 1 TABLET BY MOUTH EVERY DAY 3/8/22  Yes Linda Zavaleta MD   Advair Diskus 250-50 MCG/DOSE inhaler INHALE 1 PUFF 2 TIMES A DAY RINSE MOUTH AFTER USE  2/1/22 4/6/22  Linda Zavaleta MD       Subjective:     Review of Systems:    General: negative for - chills, fatigue, fever,  weight gain or weight loss  Psychological: negative for - anxiety, behavioral disorder, concentration difficulties, decreased libido, depression, irritability, memory difficulties, mood swings, sleep disturbances or suicidal ideation  ENT: negative for - hearing difficulties , nasal congestion, nasal discharge, oral lesions, sinus pain, sneezing, sore throat  Allergy and Immunology: negative for - hives, insect bite sensitivity,  Hematological and Lymphatic: negative for - bleeding problems, blood clots,bruising, swollen lymph nodes  Endocrine: negative for - hair pattern changes, hot flashes, malaise/lethargy, mood swings, palpitations, polydipsia/polyuria, skin changes, temperature intolerance or unexpected weight change  Respiratory: negative for - cough, hemoptysis, orthopnea, shortness of breath, or wheezing  Cardiovascular: negative for - chest pain, dyspnea on exertion, edema,  Gastrointestinal: negative for - abdominal pain, nausea/vomiting  Genito-Urinary: negative for - dysuria, incontinence, irregular/heavy menses or urinary frequency/urgency  Musculoskeletal: negative for - gait disturbance, joint pain, joint stiffness, joint swelling, muscle pain, muscular weakness  Dermatological:  As in HPI  Neurological: negative for confusion, dizziness, headaches, impaired coordination/balance, memory loss, numbness/tingling, seizures, speech problems, tremors or weakness       Objective:   Temp 98 1 °F (36 7 °C) (Temporal)   Ht 5' 4" (1 626 m)   Wt 55 8 kg (123 lb)   BMI 21 11 kg/m²     Physical Exam:    General Appearance:    Alert, cooperative, no distress   Head:    Normocephalic, without obvious abnormality, atraumatic           Skin:   A full skin exam was performed including scalp, head scalp, eyes, ears, nose, lips, neck, chest, axilla, abdomen, back, buttocks, bilateral upper extremities, bilateral lower extremities, hands, feet, fingers, toes, fingernails, and toenails 16 mm keratotic nodule noted on the lower back 8 mm keratotic nodule noted right elbow numerous other scaling areas noted on his hands areas arms and face previous sites skin cancer well healed without recurrence normal keratotic papules with greasy stuck appearance else remarkable noted on exam          Shave Biopsy Procedure Note    Pre-operative Diagnosis:  Rule squamous cell carcinoma    Plan:  1  Instructed to keep the wound dry and covered for 24 and clean thereafter  2  Warning signs of infection were reviewed  3  Recommended that the patient use OTC acetaminophen as needed for pain  4  Return  Pending results of biopsy(ies)    Locations:  Right elbow and lower back    Indications:  Suspicious lesions    Anesthesia: Lidocaine 1% with epinephrine without added sodium bicarbonate    Procedure Details     Patient informed of the risks (including bleeding and infection) and benefits of the   procedure and Verbal informed consent obtained  The lesion and surrounding area were given a sterile prep using alcohol and draped in the usual sterile fashion  A Blue blade razor was used to obtain a specimen  Hemostasis achieved with aluminum chloride  Petrolatum and a sterile dressing applied  The specimen was sent for pathologic examination   The patient tolerated the procedure(s) well     Complications:  none  Assessment:     1  Unknown skin lesion     2  Seborrheic keratosis     3  History of skin cancer     4  Screening for skin condition           Plan:   Skin lesions x2 both appear to be consistent with squamous cell carcinoma await results of biopsy and plan on complete excision  Seborrheic keratosis patient reassured these are normal growths we acquire with age no treatment needed  History of skin cancer in no recurrence nothing else atypical sunblock recommended follow-up in 6 months  Screening for dermatologic disorders nothing else of concern noted on complete exam follow-up in 6 months    Latia Martin MD  4/6/2022,2:30 PM    Portions of the record may have been created with voice recognition software   Occasional wrong word or "sound a like" substitutions may have occurred due to the inherent limitations of voice recognition software   Read the chart carefully and recognize, using context, where substitutions have occurred

## 2022-05-31 ENCOUNTER — PROCEDURE VISIT (OUTPATIENT)
Dept: DERMATOLOGY | Facility: CLINIC | Age: 77
End: 2022-05-31
Payer: MEDICARE

## 2022-05-31 VITALS — HEIGHT: 64 IN | WEIGHT: 123 LBS | BODY MASS INDEX: 21 KG/M2

## 2022-05-31 DIAGNOSIS — C44.529 SQUAMOUS CELL CARCINOMA OF LOWER BACK: Primary | ICD-10-CM

## 2022-05-31 DIAGNOSIS — D04.61: ICD-10-CM

## 2022-05-31 PROCEDURE — 88305 TISSUE EXAM BY PATHOLOGIST: CPT | Performed by: STUDENT IN AN ORGANIZED HEALTH CARE EDUCATION/TRAINING PROGRAM

## 2022-05-31 PROCEDURE — 11603 EXC TR-EXT MAL+MARG 2.1-3 CM: CPT | Performed by: DERMATOLOGY

## 2022-05-31 PROCEDURE — 12032 INTMD RPR S/A/T/EXT 2.6-7.5: CPT | Performed by: DERMATOLOGY

## 2022-05-31 NOTE — PROGRESS NOTES
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     Objective:   Ht 5' 4" (1 626 m)   Wt 55 8 kg (123 lb)   BMI 21 11 kg/m²     Physical Exam:    General Appearance:    Alert, cooperative, no distress   Skin:  Previous sites of biopsy noted the 1 on the back appears to be getting larger lesion on the arm appears to be minimally present with not much the induration    Procedure name: Excision with intermediate layered closure        Location:  Right lower back    Diagnosis: Squamous cell carcinoma    Size of lesion: 13 mm    Margins: 4 mm    Size + Margins: 21 mm    I explained the diagnosis to the patient and we recommend an excision of the lesion for diagnosis and/or treatment  Potential complications include, but are not limited to: scarring, bleeding, infection, incomplete removal, recurrence, and nerve damage  The risks, benefits, and alternatives were discussed with the patient in detail  The location of the tumor was identified, circled, and confirmed by the patient  The correct patient, site, and procedure were confirmed  Anesthesia: 1% xylocaine with 1:100,000 epinephrine 6 cc  The patient was brought into the room, prepped and draped sterilely in the usual manner and anesthesia was administered by local infiltration  A fusiform shape was drawn around the lesion, and the margins were incised to the level of the subcutaneous fat with a number 15cc Bard-Gwyn blade  The tissue was removed with sharp and blunt dissection  The lateral margins of the resulting defect were undermined with sharp and blunt dissection  Hemostasis was achieved with electrocautery  The deeper layers of the defect, including subcutaneous fat and fascia, had to be approximated to reduce tension on the suture line  Layered wound closure was performed  The wound was cleaned with saline, dried off, petroleum applied, and the wound was covered with a pressure dressing  The patient was given detailed verbal and written instructions on postoperative care  Suture for adipose/fascial/dermal layer closure: 4-0  vicryl 4 sutures interrupted    Suture used to approximate epidermis: 4-0  nylon 8 sutures interrupted    Final wound length: 4 5 cm    Follow-up in: 2weeks  Patient tolerated procedure well without complications  Assessment:     1  Squamous cell carcinoma of lower back     2  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 Authorizing Provider   Advair Diskus 250-50 MCG/DOSE inhaler INHALE 1 PUFF 2 TIMES A DAY RINSE MOUTH AFTER USE  4/6/22   Lurdes Juarez MD   cyanocobalamin (VITAMIN B-12) 1,000 mcg tablet Take by mouth daily    Historical Provider, MD   ezetimibe (ZETIA) 10 mg tablet TAKE 1 TABLET BY MOUTH EVERY DAY 3/8/22   Lurdes Juarez MD   fluocinonide (LIDEX) 0 05 % cream APPLY TOPICALLY 2 (TWO) TIMES A DAY TO POISON JAQUAN UNTIL CLEAR 8/2/19   Historical Provider, MD   Multiple Vitamin (MULTIVITAMIN) capsule Take 1 capsule by mouth daily    Historical Provider, MD   PREVIDENT 5000 DRY MOUTH 1 1 % GEL BRUSH IN PLACE OF YOUR REGULAR TOOTHPASTE AT NIGHT   5/15/19   Historical Provider, MD   rosuvastatin (CRESTOR) 10 MG tablet TAKE 1 TABLET BY MOUTH EVERY DAY 3/8/22   Lurdes Juarez MD     No Known Allergies    Annamary Res, MD  5/31/2022,2:45 PM    Portions of the record may have been created with voice recognition software   Occasional wrong word or "sound a like" substitutions may have occurred due to the inherent limitations of voice recognition software   Read the chart carefully and recognize, using context, where substitutions have occurred

## 2022-05-31 NOTE — PATIENT INSTRUCTIONS
Squamous cell carcinoma excised await results of biopsy for margin control  Squamous cell carcinoma right elbow in-situ will plan on field therapy because is numerous other lesions noted in the area

## 2022-06-03 DIAGNOSIS — J45.40 MODERATE PERSISTENT ASTHMA WITHOUT COMPLICATION: ICD-10-CM

## 2022-06-03 RX ORDER — FLUTICASONE PROPIONATE AND SALMETEROL 250; 50 UG/1; UG/1
1 POWDER RESPIRATORY (INHALATION) 2 TIMES DAILY
Qty: 60 BLISTER | Refills: 1 | Status: SHIPPED | OUTPATIENT
Start: 2022-06-03 | End: 2022-08-08 | Stop reason: SDUPTHER

## 2022-06-08 ENCOUNTER — TELEPHONE (OUTPATIENT)
Dept: DERMATOLOGY | Facility: CLINIC | Age: 77
End: 2022-06-08

## 2022-06-08 NOTE — TELEPHONE ENCOUNTER
----- Message from Giovanni Laboy MD sent at 6/8/2022  4:54 PM EDT -----  Call patient if not coming in shortly for suture removal to let him know that the margins were clear

## 2022-06-13 NOTE — PATIENT INSTRUCTIONS
Patient Education     Folliculitis  Folliculitis is an infection of a hair follicle. A hair follicle is the little pocket where a hair grows out of the skin. Bacteria normally live on the skin. But sometimes bacteria can get trapped in a follicle and cause infection. This causes a bumpy rash. The area over the follicles is red and raised. It may itch or be painful. The bumps may have fluid (pus) inside. The pus may leak and then form crusts. Sores can spread to other areas of the body. Once it goes away, folliculitis can come back at any time. Severe cases may cause lasting (permanent) hair loss and scarring.   Folliculitis can happen anywhere on the body where hair grows. It can be caused by rubbing from tight clothing. Ingrown hairs can cause it. Soaking in a hot tub or swimming pool that has bacteria in the water can cause it. It may also occur if a hair follicle is blocked by a bandage.   Sores often go away in a few days with no treatment. In some cases, medicine may be given. A small piece of skin or pus may be taken to find the type of bacteria causing the infection.   Home care  The healthcare provider may prescribe an antibiotic cream or ointment. Antibiotics taken by mouth (oral) may also be prescribed. Or you may be told to use an over-the-counter antibiotic cream. Follow all instructions when using any of these medicines.   General care  · Apply warm, moist compresses to the sores for 20 minutes up to 3 times a day. You can make a compress by soaking a cloth in warm water. Squeeze out excess water.  · Don’t cut, poke, or squeeze the sores. This can be painful and spread infection.  · Don’t scratch the affected area. Scratching can delay healing.  · Don’t shave the areas affected by folliculitis.  · If the sores leak fluid, cover the area with a nonstick gauze bandage. Use as little tape as possible. Carefully get rid of all soiled bandages.  · Dress in loose cotton clothing.  · Change sheets and  Skin lesions area on the right forehead would require excision the other lesions may be amenable to curettage even though they are quite large  Seborrheic keratosis patient reassured these are normal growths we acquire with age no treatment needed  History of skin cancer in no recurrence nothing else atypical sunblock recommended follow-up in 6 months  Screening for dermatologic disorders nothing else of concern noted on complete exam follow-up in 6 months  Wound care instructions given to patient blankets if they are soiled by pus. Wash all clothes, towels, sheets, and cloth diapers in soap and hot water. Don't share clothes, towels, or sheets with other family members.  · Don't soak the sores in bath water. This can spread infection. Instead keep the area clean by gently washing sores with soap and warm water.  · Wash your hands or use antibacterial gels often to prevent spreading the bacteria.    Follow-up care  Follow up with your healthcare provider, or as advised.  When to get medical advice  Call your healthcare provider right away if any of these occur:  · Fever of 100.4°F (38°C) or higher, or as directed by your provider  · Rash spreads  · Rash does not get better with treatment  · Redness or swelling that gets worse  · Rash becomes more painful  · Foul-smelling fluid leaking from the skin  · Rash improves, but then comes back   Cheng last reviewed this educational content on 8/1/2019  © 8835-0482 [employer]. All rights reserved. This information is not intended as a substitute for professional medical care. Always follow your healthcare professional's instructions.           Patient Education     Sty (or Stye)  A sty is when the oil gland of the eyelid becomes inflamed. It may develop into an infection with a small pocket of pus (an abscess). This can cause pain, redness, and swelling. In early stages, a sty is treated with antibiotic cream, eye drops, or a small towel soaked in warm water (a warm compress). More severe cases may need to be opened and drained by a healthcare provider.   Home care  · Eye drops or ointment are often prescribed to treat the infection. Use these as directed.   · Artificial tears may also be used to lubricate the eye and make it more comfortable. You can buy these over the counter without a prescription. Talk with your healthcare provider before using any over-the-counter treatment for a sty.  · Apply a warm, damp towel to the affected area for at least  5 minutes, 3 to 4 times a day for a week. Warm compresses open the pores and speed the healing. Make sure the compresses are not too hot, as they may burn your eyelid.  · Sometimes the sty will drain with this treatment alone. If this happens, keep using the antibiotic until all the redness and swelling are gone.  · Wash your hands before and after touching the infected eyelid.  · Don’t squeeze or try to break open the sty.    Follow-up care  Follow up with your healthcare provider, or as advised.   When to seek medical advice  Call your healthcare provider or seek medical care right away if any of these occur:   · Increase in swelling or redness around the eyelid after 48 to 72 hours  · Increase in eye pain or the eyelid blisters  · Increase in warmth--the eyelid feels hot  · Drainage of blood or thick pus from the sty  · Blister on the eyelid  · Inability to open the eyelid due to swelling  · Fever of 100.4°F (38°C) or above, or as directed by your provider  · Vision changes  · Headache or stiff neck  · The sty comes back  Zazuba last reviewed this educational content on 6/1/2020  © 0259-7230 [employer]. All rights reserved. This information is not intended as a substitute for professional medical care. Always follow your healthcare professional's instructions.

## 2022-06-14 ENCOUNTER — OFFICE VISIT (OUTPATIENT)
Dept: DERMATOLOGY | Facility: CLINIC | Age: 77
End: 2022-06-14
Payer: MEDICARE

## 2022-06-14 DIAGNOSIS — L57.0 ACTINIC KERATOSIS: ICD-10-CM

## 2022-06-14 DIAGNOSIS — C44.529 SQUAMOUS CELL CARCINOMA OF LOWER BACK: Primary | ICD-10-CM

## 2022-06-14 DIAGNOSIS — D04.61: ICD-10-CM

## 2022-06-14 PROCEDURE — 17000 DESTRUCT PREMALG LESION: CPT | Performed by: DERMATOLOGY

## 2022-06-14 PROCEDURE — 17003 DESTRUCT PREMALG LES 2-14: CPT | Performed by: DERMATOLOGY

## 2022-06-14 NOTE — PATIENT INSTRUCTIONS
Squamous cell carcinoma well-healed no further treatment needed however the lesion on the elbow which we really can not discern the exact site because it has healed so well from the previous biopsy we elected to go ahead treat several areas including this other actinic keratosis x5 with liquid nitrogen cryosurgery will have patient come back in 3 months at which point we will discuss field therapy if there is still lesions present  Treatment with Cryotherapy    The doctor has treated your skin with nitrogen, which is 320 degrees Fahrenheit below zero  He has given the treated area "frostbite "    Stinging should subside within a few hours  You can take Tylenol for pain, if needed  Over the next few days, it is normal if the area becomes reddened, a blood blister, or swollen with fluid  If the lesion treated was near the eye - you could get a swollen eye over the next few days  Do not panic! This is all temporary, and will resolve with time  There is no special treatment - just keep the area clean  Makeup and BandAids can be used, if preferred  When the area starts to dry up and peel off, using Vaseline can help healing  It usually takes up to a month for it to heal   Some lesions are recurrent and may require repeat treatments  If a lesion has not healed in one month, please don't hesitate to contact us  If you have any further questions that are not answered here, please call us  [phone]  STERI-STRIPS (BUTTERFLY) POST SURGERY        Steri-Strips have been placed over your incision site to give added support  Please continue to bathe the area as usual     Eventually the Steri-Strips will begin to peel off on the ends  Snip the raised ends off with scissors and let the rest fall off on their own  If you have any questions, please call our office  [phone]      Thank you for allowing us to care for you

## 2022-06-14 NOTE — PROGRESS NOTES
[facility]  [address] [place] [id number]  [phone]  [phone]     MRN: [id number] : 1945  Encounter: [id number]  Patient Information: Brittni Donato    Subjective:     69-year-old male presents for suture removal from previous excised squamous cell carcinoma right lower back  No problems noted     Objective: There were no vitals taken for this visit  Physical Exam:    General Appearance:    Alert, cooperative, no distress   Skin:   Previous site of excision healing well  Procedure:  Suture removal  Site of excision:  Right lower back  Wound was cleansed with saline  Wound is intact  Sutures removed  Steri-Strips applied  Biopsy revealed invasive squamous cell carcinoma margins clear  Physical Exam  Constitutional:              Cryotherapy Procedure Note    Pre-operative Diagnosis: actinic keratosis    Plan:  1  Instructed to keep the area dry and clean thereafter  Apply petrolatum if area gets crusty  2  Recommended that the patient use acetaminophen  as needed for pain  Locations:  Right dorsum of the hand right elbow and right upper arm     Indications: Destruction of actinic keratoses x5    Patient informed of risks (permanent scarring, infection, light or dark discoloration, bleeding, infection, weakness, numbness and recurrence of the lesion) and benefits of the procedure and verbal informed consent obtained  The areas are treated with liquid nitrogen therapy, frozen until ice ball extended 2 mm beyond lesion, allowed to thaw, and treated again  The patient tolerated procedure well  The patient was instructed on post-op care, warned that there may be blister formation, redness and pain  Recommend OTC analgesia as needed for pain  Condition:  Stable    Complications:  none  Assessment:     1  Squamous cell carcinoma of lower back     2   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 Authorizing Provider   Fluticasone-Salmeterol (Advair Diskus) 250-50 mcg/dose inhaler Inhale 1 puff 2 (two) times a day Rinse mouth after use  6/3/22   Reynaldo Day MD   cyanocobalamin (VITAMIN B-12) 1,000 mcg tablet Take by mouth daily    Historical Provider, MD   ezetimibe (ZETIA) 10 mg tablet TAKE 1 TABLET BY MOUTH EVERY DAY 3/8/22   Reynaldo Day MD   fluocinonide (LIDEX) 0 05 % cream APPLY TOPICALLY 2 (TWO) TIMES A DAY TO POISON JAQUAN UNTIL CLEAR 8/2/19   Historical Provider, MD   Multiple Vitamin (MULTIVITAMIN) capsule Take 1 capsule by mouth daily    Historical Provider, MD   PREVIDENT 5000 DRY MOUTH 1 1 % GEL BRUSH IN PLACE OF YOUR REGULAR TOOTHPASTE AT NIGHT  5/15/19   Historical Provider, MD   rosuvastatin (CRESTOR) 10 MG tablet TAKE 1 TABLET BY MOUTH EVERY DAY 3/8/22   Reynaldo Day MD     No Known Allergies    Min Maldonado MD  6/14/2022,3:27 PM    Portions of the record may have been created with voice recognition software   Occasional wrong word or "sound a like" substitutions may have occurred due to the inherent limitations of voice recognition software   Read the chart carefully and recognize, using context, where substitutions have occurred

## 2022-08-08 DIAGNOSIS — J45.40 MODERATE PERSISTENT ASTHMA WITHOUT COMPLICATION: ICD-10-CM

## 2022-08-08 RX ORDER — FLUTICASONE PROPIONATE AND SALMETEROL 250; 50 UG/1; UG/1
1 POWDER RESPIRATORY (INHALATION) 2 TIMES DAILY
Qty: 60 BLISTER | Refills: 1 | Status: SHIPPED | OUTPATIENT
Start: 2022-08-08 | End: 2022-09-14 | Stop reason: SDUPTHER

## 2022-09-03 DIAGNOSIS — E78.2 MIXED HYPERLIPIDEMIA: ICD-10-CM

## 2022-09-06 RX ORDER — ROSUVASTATIN CALCIUM 10 MG/1
TABLET, COATED ORAL
Qty: 90 TABLET | Refills: 1 | Status: SHIPPED | OUTPATIENT
Start: 2022-09-06

## 2022-09-06 RX ORDER — EZETIMIBE 10 MG/1
TABLET ORAL
Qty: 90 TABLET | Refills: 1 | Status: SHIPPED | OUTPATIENT
Start: 2022-09-06

## 2022-09-14 ENCOUNTER — OFFICE VISIT (OUTPATIENT)
Dept: FAMILY MEDICINE CLINIC | Facility: MEDICAL CENTER | Age: 77
End: 2022-09-14
Payer: MEDICARE

## 2022-09-14 VITALS
DIASTOLIC BLOOD PRESSURE: 82 MMHG | SYSTOLIC BLOOD PRESSURE: 124 MMHG | HEART RATE: 60 BPM | HEIGHT: 64 IN | WEIGHT: 118 LBS | BODY MASS INDEX: 20.14 KG/M2 | OXYGEN SATURATION: 98 %

## 2022-09-14 DIAGNOSIS — E78.2 MIXED HYPERLIPIDEMIA: ICD-10-CM

## 2022-09-14 DIAGNOSIS — L82.1 SEBORRHEIC KERATOSIS: ICD-10-CM

## 2022-09-14 DIAGNOSIS — L57.0 ACTINIC KERATOSIS: ICD-10-CM

## 2022-09-14 DIAGNOSIS — Z23 IMMUNIZATION DUE: ICD-10-CM

## 2022-09-14 DIAGNOSIS — Z00.00 PREVENTATIVE HEALTH CARE: Primary | ICD-10-CM

## 2022-09-14 DIAGNOSIS — J45.40 MODERATE PERSISTENT ASTHMA WITHOUT COMPLICATION: ICD-10-CM

## 2022-09-14 PROCEDURE — 99214 OFFICE O/P EST MOD 30 MIN: CPT | Performed by: FAMILY MEDICINE

## 2022-09-14 PROCEDURE — G0439 PPPS, SUBSEQ VISIT: HCPCS | Performed by: FAMILY MEDICINE

## 2022-09-14 RX ORDER — FLUTICASONE PROPIONATE AND SALMETEROL 250; 50 UG/1; UG/1
1 POWDER RESPIRATORY (INHALATION) 2 TIMES DAILY
Qty: 60 BLISTER | Refills: 1 | Status: SHIPPED | OUTPATIENT
Start: 2022-09-14

## 2022-09-14 NOTE — ASSESSMENT & PLAN NOTE
His last LDL was 90  He is taking rosuvastatin and Zetia  He eats a low-fat diet      Continue low-fat diet, continue exercise, continue Zetia and rosuvastatin

## 2022-09-14 NOTE — PROGRESS NOTES
Assessment and Plan:     Problem List Items Addressed This Visit        Respiratory    Moderate persistent asthma without complication     He is doing well with Advair  He does not need a rescue inhaler  He uses it every day  Continue Advair  Relevant Medications    Fluticasone-Salmeterol (Advair Diskus) 250-50 mcg/dose inhaler       Musculoskeletal and Integument    Actinic keratosis    Seborrheic keratosis       Other    Mixed hyperlipidemia     His last LDL was 90  He is taking rosuvastatin and Zetia  He eats a low-fat diet  Continue low-fat diet, continue exercise, continue Zetia and rosuvastatin           Other Visit Diagnoses     Preventative health care    -  Primary    Immunization due               Preventive health issues were discussed with patient, and age appropriate screening tests were ordered as noted in patient's After Visit Summary  Personalized health advice and appropriate referrals for health education or preventive services given if needed, as noted in patient's After Visit Summary  History of Present Illness:     Patient presents for a Medicare Wellness Visit    This is a delightful 78-year-old retired   He is living with his long-term girlfriend  They like to walk every day with her dogs  He has a bad time with the skin cancer and precancerous lesions  He follows up with Dermatology  Patient Care Team:  Andre Cunningham MD as PCP - General (Family Medicine)  MD Omid Pond MD     Review of Systems:     Review of Systems   Constitutional: Negative for activity change, fatigue and fever  HENT: Negative for congestion, ear discharge, ear pain, postnasal drip, rhinorrhea, sinus pain, sneezing and sore throat  Eyes: Negative for photophobia, pain, discharge and redness  Respiratory: Negative for apnea, cough, shortness of breath and wheezing  Cardiovascular: Negative for chest pain and palpitations     Gastrointestinal: Negative for abdominal pain, blood in stool, constipation (Occasionally ), diarrhea, nausea and vomiting  Endocrine: Negative for polydipsia, polyphagia and polyuria  Genitourinary: Negative for decreased urine volume, difficulty urinating, dysuria, frequency, penile discharge, penile pain and urgency  Musculoskeletal: Negative for arthralgias, gait problem, joint swelling and neck pain  Skin: Negative for color change and rash  Neurological: Negative for dizziness, tremors, seizures, weakness and headaches  Psychiatric/Behavioral: Negative for agitation and sleep disturbance  The patient is not nervous/anxious           Problem List:     Patient Active Problem List   Diagnosis    Actinic keratosis    Seborrheic keratosis    Squamous cell carcinoma of back    Moderate persistent asthma without complication    Mixed hyperlipidemia      Past Medical and Surgical History:     Past Medical History:   Diagnosis Date    Asthma     Malignant neoplasm of skin     Nonmelanoma skin cancer     Last assessed 6/27/2017      Past Surgical History:   Procedure Laterality Date    CATARACT EXTRACTION      EYE SURGERY  2010    Cataracts    HERNIA REPAIR  2016    OR REPAIR ING HERNIA,5+Y/O,REDUCIBL Left 7/21/2016    Procedure: INGUINAL HERNIA REPAIR ;  Surgeon: Vanesa Jolly MD;  Location: [facility];  Service: General      Family History:     Family History   Problem Relation Age of Onset    Coronary artery disease Father     No Known Problems Sister     No Known Problems Brother     Prostate cancer Brother     No Known Problems Son       Social History:     Social History     Socioeconomic History    Marital status: /Civil Union     Spouse name: None    Number of children: None    Years of education: None    Highest education level: None   Occupational History    None   Tobacco Use    Smoking status: Former Smoker     Packs/day: 0 00     Years: 0 00     Pack years: 0 00    Smokeless tobacco: Never Used    Tobacco comment: [employer] service   Substance and Sexual Activity    Alcohol use: No    Drug use: No    Sexual activity: Yes     Partners: Female     Comment:    Other Topics Concern    None   Social History Narrative    Never a smoker - As per [employer]      Social Determinants of Health     Financial Resource Strain: Not on file   Food Insecurity: Not on file   Transportation Needs: Not on file   Physical Activity: Not on file   Stress: Not on file   Social Connections: Not on file   Intimate Partner Violence: Not on file   Housing Stability: Not on file      Medications and Allergies:     Current Outpatient Medications   Medication Sig Dispense Refill    cyanocobalamin (VITAMIN B-12) 1,000 mcg tablet Take by mouth daily      ezetimibe (ZETIA) 10 mg tablet TAKE 1 TABLET BY MOUTH EVERY DAY 90 tablet 1    Fluticasone-Salmeterol (Advair Diskus) 250-50 mcg/dose inhaler Inhale 1 puff 2 (two) times a day Rinse mouth after use  60 blister 1    Multiple Vitamin (MULTIVITAMIN) capsule Take 1 capsule by mouth daily      rosuvastatin (CRESTOR) 10 MG tablet TAKE 1 TABLET BY MOUTH EVERY DAY 90 tablet 1     No current facility-administered medications for this visit  No Known Allergies   Immunizations:     Immunization History   Administered Date(s) Administered    COVID-19 MODERNA VACC 0 5 ML IM 01/30/2021, 02/25/2021    COVID-19 PFIZER VACCINE 0 3 ML IM 10/29/2021    INFLUENZA 09/17/2020    Influenza Split High Dose Preservative Free IM 10/15/2019    Pneumococcal Polysaccharide PPV23 11/03/2018      Health Maintenance:         Topic Date Due    Hepatitis C Screening  Never done         Topic Date Due    Pneumococcal Vaccine: 65+ Years (2 - PCV) 11/03/2019    COVID-19 Vaccine (4 - Booster for Moderna series) 02/28/2022    Influenza Vaccine (1) 09/01/2022      Medicare Screening Tests and Risk Assessments:     Petty Bethea is here for his Subsequent Wellness visit       Health Risk Assessment: Patient rates overall health as very good  Patient feels that their physical health rating is same  Patient is satisfied with their life  Eyesight was rated as same  Hearing was rated as same  Patient feels that their emotional and mental health rating is same  Patients states they are never, rarely angry  Patient states they are never, rarely unusually tired/fatigued  Pain experienced in the last 7 days has been none  Patient states that he has experienced no weight loss or gain in last 6 months  Fall Risk Screening: In the past year, patient has experienced: no history of falling in past year      Home Safety:  Patient does not have trouble with stairs inside or outside of their home  Patient has working smoke alarms and has working carbon monoxide detector  Home safety hazards include: none  Nutrition:   Current diet is Low Cholesterol and Low Saturated Fat  Medications:   Patient is currently taking over-the-counter supplements  OTC medications include: calcium, zinc, probiotic, vitamin d, vitamin c, vitamin b1, vitamin b12, coq10, fish oil  Patient is able to manage medications  Activities of Daily Living (ADLs)/Instrumental Activities of Daily Living (IADLs):   Walk and transfer into and out of bed and chair?: Yes  Dress and groom yourself?: Yes    Bathe or shower yourself?: Yes    Feed yourself?  Yes  Do your laundry/housekeeping?: Yes  Manage your money, pay your bills and track your expenses?: Yes  Make your own meals?: Yes    Do your own shopping?: Yes    Previous Hospitalizations:   Any hospitalizations or ED visits within the last 12 months?: No      Advance Care Planning:   Living will: No    Durable POA for healthcare: No    Advanced directive: No      PREVENTIVE SCREENINGS      Cardiovascular Screening:    General: Screening Not Indicated and History Lipid Disorder      Diabetes Screening:     General: Screening Current      Prostate Cancer Screening:    General: Screening Not Indicated      Abdominal Aortic Aneurysm (AAA) Screening:    Risk factors include: tobacco use        Lung Cancer Screening:     General: Screening Not Indicated    Screening, Brief Intervention, and Referral to Treatment (SBIRT)    Screening  Typical number of drinks in a day: 0  Typical number of drinks in a week: 0  Interpretation: Low risk drinking behavior  AUDIT-C Screenin) How often did you have a drink containing alcohol in the past year? never  2) How many drinks did you have on a typical day when you were drinking in the past year? 0  3) How often did you have 6 or more drinks on one occasion in the past year? never    AUDIT-C Score: 0  Interpretation: Score 0-3 (male): Negative screen for alcohol misuse    Single Item Drug Screening:  How often have you used an illegal drug (including marijuana) or a prescription medication for non-medical reasons in the past year? never    Single Item Drug Screen Score: 0  Interpretation: Negative screen for possible drug use disorder    No exam data present     Physical Exam:     /82 (BP Location: Left arm, Patient Position: Sitting, Cuff Size: Adult)   Pulse 60   Ht 5' 4" (1 626 m)   Wt 53 5 kg (118 lb)   SpO2 98%   BMI 20 25 kg/m²     Physical Exam  Vitals and nursing note reviewed  Constitutional:       General: He is not in acute distress  Appearance: He is well-developed and underweight  He is not ill-appearing  HENT:      Head: Normocephalic and atraumatic  Right Ear: Tympanic membrane normal       Left Ear: Tympanic membrane normal       Nose: Nose normal       Mouth/Throat:      Mouth: Mucous membranes are moist       Pharynx: No oropharyngeal exudate or posterior oropharyngeal erythema  Eyes:      Extraocular Movements: Extraocular movements intact  Conjunctiva/sclera: Conjunctivae normal       Pupils: Pupils are equal, round, and reactive to light  Cardiovascular:      Rate and Rhythm: Normal rate and regular rhythm  Pulses: Normal pulses  Heart sounds: Normal heart sounds  No murmur heard  Pulmonary:      Effort: Pulmonary effort is normal  No respiratory distress  Breath sounds: Normal breath sounds  No wheezing or rhonchi  Abdominal:      General: Abdomen is flat  Palpations: Abdomen is soft  There is no mass  Tenderness: There is no abdominal tenderness  Musculoskeletal:         General: Normal range of motion  Cervical back: Normal range of motion and neck supple  Skin:     General: Skin is warm and dry  Comments: He has a lot of sun damaged skin, a lot of seborrheic keratoses and actinic keratosis and multiple skin procedures  Neurological:      General: No focal deficit present  Mental Status: He is alert  Psychiatric:         Mood and Affect: Mood normal          Behavior: Behavior normal          Thought Content:  Thought content normal           Holly Bustillo MD

## 2022-09-14 NOTE — ASSESSMENT & PLAN NOTE
He is doing well with Advair  He does not need a rescue inhaler  He uses it every day  Continue Advair

## 2022-10-11 ENCOUNTER — OFFICE VISIT (OUTPATIENT)
Dept: DERMATOLOGY | Facility: CLINIC | Age: 77
End: 2022-10-11

## 2022-10-11 VITALS — BODY MASS INDEX: 20.14 KG/M2 | HEIGHT: 64 IN | WEIGHT: 118 LBS

## 2022-10-11 DIAGNOSIS — L57.0 ACTINIC KERATOSIS: ICD-10-CM

## 2022-10-11 DIAGNOSIS — L82.1 SEBORRHEIC KERATOSIS: ICD-10-CM

## 2022-10-11 DIAGNOSIS — L98.9 UNKNOWN SKIN LESION: Primary | ICD-10-CM

## 2022-10-11 DIAGNOSIS — Z85.828 HISTORY OF SKIN CANCER: ICD-10-CM

## 2022-10-11 DIAGNOSIS — Z13.89 SCREENING FOR SKIN CONDITION: ICD-10-CM

## 2022-10-11 PROCEDURE — 88305 TISSUE EXAM BY PATHOLOGIST: CPT | Performed by: STUDENT IN AN ORGANIZED HEALTH CARE EDUCATION/TRAINING PROGRAM

## 2022-10-11 NOTE — PROGRESS NOTES
[address]  [address] [facility] [address]  [phone]  [phone]     MRN: [id number] : 1945  Encounter: [id number]  Patient Information: Nahun Elmore  Chief complaint:  Skin lesions six-month checkup    History of present illness: 65-year-old male with history of multiple nonmelanoma skin cancers and actinic keratosis presents for overall checkup no specific complaints  Past Medical History:   Diagnosis Date   • Asthma    • Malignant neoplasm of skin    • Nonmelanoma skin cancer     Last assessed 2017      Past Surgical History:   Procedure Laterality Date   • CATARACT EXTRACTION     • EYE SURGERY      Cataracts   • HERNIA REPAIR     • IA REPAIR ING HERNIA,5+Y/O,REDUCIBL Left 2016    Procedure: INGUINAL HERNIA REPAIR ;  Surgeon: Walker Gill MD;  [facility];  Service: General     Social History   Social History     Substance and Sexual Activity   Alcohol Use No     Social History     Substance and Sexual Activity   Drug Use No     Social History     Tobacco Use   Smoking Status Former Smoker   • Packs/day: 0 00   • Years: 0 00   • Pack years: 0 00   Smokeless Tobacco Never Used   Tobacco Comment    [employer] service     Family History   Problem Relation Age of Onset   • Coronary artery disease Father    • No Known Problems Sister    • No Known Problems Brother    • Prostate cancer Brother    • No Known Problems Son      Meds/Allergies   No Known Allergies    Meds:  Prior to Admission medications    Medication Sig Start Date End Date Taking? Authorizing Provider   cyanocobalamin (VITAMIN B-12) 1,000 mcg tablet Take by mouth daily   Yes Historical Provider, MD   ezetimibe (ZETIA) 10 mg tablet TAKE 1 TABLET BY MOUTH EVERY DAY 22  Yes Miguel Rosa MD   Fluticasone-Salmeterol (Advair Diskus) 250-50 mcg/dose inhaler Inhale 1 puff 2 (two) times a day Rinse mouth after use   22  Yes Miguel Rosa MD   Multiple Vitamin (MULTIVITAMIN) capsule Take 1 capsule by mouth daily   Yes Historical Provider, MD   rosuvastatin (CRESTOR) 10 MG tablet TAKE 1 TABLET BY MOUTH EVERY DAY 9/6/22  Yes Curtis Orona MD       Subjective:     Review of Systems:    General: negative for - chills, fatigue, fever,  weight gain or weight loss  Psychological: negative for - anxiety, behavioral disorder, concentration difficulties, decreased libido, depression, irritability, memory difficulties, mood swings, sleep disturbances or suicidal ideation  ENT: negative for - hearing difficulties , nasal congestion, nasal discharge, oral lesions, sinus pain, sneezing, sore throat  Allergy and Immunology: negative for - hives, insect bite sensitivity,  Hematological and Lymphatic: negative for - bleeding problems, blood clots,bruising, swollen lymph nodes  Endocrine: negative for - hair pattern changes, hot flashes, malaise/lethargy, mood swings, palpitations, polydipsia/polyuria, skin changes, temperature intolerance or unexpected weight change  Respiratory: negative for - cough, hemoptysis, orthopnea, shortness of breath, or wheezing  Cardiovascular: negative for - chest pain, dyspnea on exertion, edema,  Gastrointestinal: negative for - abdominal pain, nausea/vomiting  Genito-Urinary: negative for - dysuria, incontinence, irregular/heavy menses or urinary frequency/urgency  Musculoskeletal: negative for - gait disturbance, joint pain, joint stiffness, joint swelling, muscle pain, muscular weakness  Dermatological:  As in HPI  Neurological: negative for confusion, dizziness, headaches, impaired coordination/balance, memory loss, numbness/tingling, seizures, speech problems, tremors or weakness       Objective:   Ht 5' 4" (1 626 m)   Wt 53 5 kg (118 lb)   BMI 20 25 kg/m²     Physical Exam:    General Appearance:    Alert, cooperative, no distress   Head:    Normocephalic, without obvious abnormality, atraumatic           Skin:   A full skin exam was performed including scalp, head scalp, eyes, ears, nose, lips, neck, chest, axilla, abdomen, back, buttocks, bilateral upper extremities, bilateral lower extremities, hands, feet, fingers, toes, fingernails, and toenails 10 x 14mm keratotic nodule noted on the right posterior calf  Scaling erythematous areas noted below normal keratotic papules greasy stuck on appearance previous sites skin cancer well healed without recurrence nothing else atypical noted exam  Physical Exam  Constitutional:        HENT:      Head:       Shave Biopsy Procedure Note    Pre-operative Diagnosis:  Rule out squamous cell carcinoma    Plan:  1  Instructed to keep the wound dry and covered for 24 and clean thereafter  2  Warning signs of infection were reviewed  3  Recommended that the patient use OTC acetaminophen as needed for pain  4  Return  Pending results of biopsy(ies)    Locations:  Right calf    Indications:  Suspicious lesion    Anesthesia: Lidocaine 1% with epinephrine without added sodium bicarbonate    Procedure Details     Patient informed of the risks (including bleeding and infection) and benefits of the   procedure and Verbal informed consent obtained  The lesion and surrounding area were given a sterile prep using alcohol and draped in the usual sterile fashion  A Blue blade razor was used to obtain a specimen  Hemostasis achieved with aluminum chloride  Petrolatum and a sterile dressing applied  The specimen was sent for pathologic examination  The patient tolerated the procedure(s) well  Complications:  None  Cryotherapy Procedure Note    Pre-operative Diagnosis: actinic keratosis    Plan:  1  Instructed to keep the area dry and clean thereafter  Apply petrolatum if area gets crusty  2  Recommended that the patient use acetaminophen  as needed for pain       Locations:  Dorsum of the hands and right calf    Indications: Destruction of actinic keratoses x4    Patient informed of risks (permanent scarring, infection, light or dark discoloration, bleeding, infection, weakness, numbness and recurrence of the lesion) and benefits of the procedure and verbal informed consent obtained  The areas are treated with liquid nitrogen therapy, frozen until ice ball extended 2 mm beyond lesion, allowed to thaw, and treated again  The patient tolerated procedure well  The patient was instructed on post-op care, warned that there may be blister formation, redness and pain  Recommend OTC analgesia as needed for pain  Condition:  Stable    Complications:  none  Assessment:     1  Unknown skin lesion     2  Actinic keratosis     3  History of skin cancer     4  Seborrheic keratosis     5  Screening for skin condition           Plan:   Skin lesion possible squamous cell carcinoma await results of biopsy would require complete excision  Actinic Keratosis:  Patient advised lesions are precancers  These should resolve with cryosurgery if not to let us know sun block recommended  Seborrheic keratosis patient reassured these are normal growths we acquire with age no treatment needed  History of skin cancer in no recurrence nothing else atypical sunblock recommended follow-up in 6 months  Screening for dermatologic disorders nothing else of concern noted on complete exam follow-up in 6 months  Wound care instructions given to patient      Natalia Connolly  10/11/2022,3:06 PM    Portions of the record may have been created with voice recognition software   Occasional wrong word or "sound a like" substitutions may have occurred due to the inherent limitations of voice recognition software   Read the chart carefully and recognize, using context, where substitutions have occurred

## 2022-10-11 NOTE — PATIENT INSTRUCTIONS
Skin lesion possible squamous cell carcinoma await results of biopsy would require complete excision  Actinic Keratosis:  Patient advised lesions are precancers  These should resolve with cryosurgery if not to let us know sun block recommended  Seborrheic keratosis patient reassured these are normal growths we acquire with age no treatment needed  History of skin cancer in no recurrence nothing else atypical sunblock recommended follow-up in 6 months  Screening for dermatologic disorders nothing else of concern noted on complete exam follow-up in 6 months  Dr Chapis Kelly Shave/Punch Biopsy After Care Instructions      Remove bandage the next day  Keep bandage dry  Shower or Bathe as usual the next day  Cleanse the area twice daily with saline or hydrogen peroxide  Apply Vaseline  WE ADVISE YOU NOT TO USE NEOSPORIN OR ANY TOPICAL ANTIBIOTIC UNLESS INSTRUCTED BY THE DOCTOR  Cover area with a dressing or Band-Aid if possible  Continue treatment until completely healed  (Skin appears in pink)  Try to avoid scab formation  Slight bleeding may occur after the Band-Aid/Dressing is removed or the first few days after the procedure was done  Don't panic!! Apply continuous, direct pressure on the dressing over the wound for 15-20 minutes  DO NOT remove dressing  HINT:  Set a timer for 15-20 minutes to make sure you press on the wound long enough  SOAKING: the dressing before you remove it should decrease chances of bleeding  If the wound is on the legs or arms, swelling may occur  Elevating the arm or leg above the level of the heart as much as possible will also decrease swelling, promote healing and decrease chances of bleeding  ANY QUESTION PLEASE CALL OUR OFFICE  [id number]  IF AFTER HOURS, THE ANSWERING SERVICE WILL GET A HOLD OF THE DOCTOR  PLEASE BE ADVISED THAT BIOPSY RESULTS CAN TAKE UP TO 1 TO 2 WEEKS   YOU WILL RECEIVE THE RESULTS IN [id number], BUT PLEASE WAIT FOR THE DOCTOR OR STAFF TO NOTIFY YOU  Ora Karimi!! Treatment with Cryotherapy    The doctor has treated your skin with nitrogen, which is 320 degrees Fahrenheit below zero  He has given the treated area "frostbite "    Stinging should subside within a few hours  You can take Tylenol for pain, if needed  Over the next few days, it is normal if the area becomes reddened, a blood blister, or swollen with fluid  If the lesion treated was near the eye - you could get a swollen eye over the next few days  Do not panic! This is all temporary, and will resolve with time  There is no special treatment - just keep the area clean  Makeup and BandAids can be used, if preferred  When the area starts to dry up and peel off, using Vaseline can help healing  It usually takes up to a month for it to heal   Some lesions are recurrent and may require repeat treatments  If a lesion has not healed in one month, please don't hesitate to contact us  If you have any further questions that are not answered here, please call us  [phone]    Thank you for allowing us to care for you

## 2022-10-26 ENCOUNTER — PROCEDURE VISIT (OUTPATIENT)
Dept: DERMATOLOGY | Facility: CLINIC | Age: 77
End: 2022-10-26

## 2022-10-26 VITALS — WEIGHT: 118 LBS | HEIGHT: 64 IN | BODY MASS INDEX: 20.14 KG/M2

## 2022-10-26 DIAGNOSIS — C44.712 BASAL CELL CARCINOMA (BCC) OF RIGHT LOWER LEG: Primary | ICD-10-CM

## 2022-10-26 NOTE — PATIENT INSTRUCTIONS
Wound care instructions given to patient  Dr Halle Melgar - Surgery with Sutures After Care Instructions    WOUND CARE AFTER SURGERY:    Do NOT to remove the pressure bandage for 48 hours  Keep the area clean and dry while this bandage is on  After removing the bandage for the first time, gently clean the area with soap and water  If the bandage is difficult to remove, getting the bandage wet in the shower will sometimes help soften the adhesive and allow it to be removed more easily  You will now need to cleanse this area daily in the shower with gentle soap  There is no need to scrub the area  Apply plain Vaseline ointment (this is over the counter and not a prescription) to the site followed by a clean appropriately sized bandage to area  Non stick dressing and paper tape (or Hypafix) are recommended for sensitive skin but a bandaid is fine if it covers the area well  DO NOT USE NEOSPORIN, BACITRACIN OR ANY TOPICAL ANTIBIOTIC UNLESS INSTRUCTED BY THE DOCTOR  You will need to continue the above daily wound care until you return for suture removal in 14 days (generally 7 days for the face, 10-14 days off the face)      RESTRICTIONS:     For two DAYS:   - You will need to take it very easy as this time is highest risk for bleeding  Being a "couch potato" during these two days is generally recommended  - For surgeries on the face/neck/scalp: Avoid leaning down to pick things up off the floor as this brings blood up to your head  Instead, squat down to pick things up  For two WEEKS:   - No heavy lifting (anything greater than 10 pounds)   - You can start to do slow, gentle activities such as slow walking but nothing to increase your heart rate and blood pressure too much (such as cardiovascular exercise)  It is important to take it easy as there is still a risk for bleeding and a high risk popping of stitches open during this time       If we did surgery near the eyes (including the nose, forehead, front part of your scalp, cheeks): It is VERY common to get a large amount of swelling around your eyes (puffy eyes)  Although less frequent, this can be enough to swell your eyes shut and can also come along with bruising  This should not hurt and is very expected and normal  It is typically worst at ~ 3 days out from your surgery and dramatically better 1 week post-operatively  If we did surgery around your nose: No blowing your nose as this puts you at higher risk of popping stitches durign this time  Instead dab under your nose with a tissue or use a Q-tip inside your nose  If we did surgery on the skin above or bellow your lip or your lip itself: No sipping from straws as this uses a lot of the muscles around your mouth and increases the risk of popping stitches during this time  MANAGING YOUR PAIN AFTER SURGERY     You can expect to have some pain after surgery  This is normal  The pain is typically worse the first two days after surgery, and quickly begins to get better  The best strategy for controlling your pain after surgery is around the clock pain control  You can take over the counter Acetaminophen (Tylenol) for discomfort, if no contraindications  If you are taking this at the maximum dose, you can alternate this with Motrin (ibuprofen or Advil) as well  Alternating these medications with each other allows you to maximize your pain control  In addition to Tylenol and Motrin, you can use heating pads or ice packs on your incisions to help reduce your pain  How will I alternate your regular strength over-the-counter pain medication? You will take a dose of pain medication every three hours  Start by taking 650 mg of Tylenol (2 pills of 325 mg)   3 hours later take 600 mg of Motrin (3 pills of 200 mg)   3 hours after taking the Motrin take 650 mg of Tylenol   3 hours after that take 600 mg of Motrin      See example - if your first dose of Tylenol is at 12:00 PM     12:00 PM Tylenol 650 mg (2 pills of 325 mg)    3:00 PM  Motrin 600 mg (3 pills of 200 mg)    6:00 PM  Tylenol 650 mg (2 pills of 325 mg)    9:00 PM  Motrin 600 mg (3 pills of 200 mg)    Continue alternating every 3 hours      Important:   Do not take more than 4000mg of Tylenol or 3200mg of Motrin in a 24-hour period  CALL OUR OFFICE IMMEDIATELY FOR ANY SIGNS OF INFECTION:    This includes fever, chills, increased redness, warmth, tenderness, severe discomfort/pain, or pus or foul smell coming from the wound  [facility] directly at [phone]  IF BLEEDING IS NOTICED:    Place a clean cloth over the area and apply firm pressure for thirty minutes  Check the wound ONLY after 30 minutes of direct pressure; do not cheat and sneak a peak, as that does not count  If bleeding persists after 30 minutes of legitimate direct pressure, then try one more round of direct pressure to the area  Should the bleeding become heavier or not stop after the second attempt, call [facility] directly at [phone]  Your call will get routed to the dermatology surgeon on call even after hours

## 2022-11-09 ENCOUNTER — CLINICAL SUPPORT (OUTPATIENT)
Dept: DERMATOLOGY | Facility: CLINIC | Age: 77
End: 2022-11-09

## 2022-11-09 VITALS — BODY MASS INDEX: 20.14 KG/M2 | HEIGHT: 64 IN | WEIGHT: 118 LBS

## 2022-11-09 DIAGNOSIS — C44.712 BASAL CELL CARCINOMA (BCC) OF RIGHT LOWER LEG: Primary | ICD-10-CM

## 2022-11-09 NOTE — PATIENT INSTRUCTIONS
Wound care instructions given to patient  STERI-STRIPS (BUTTERFLY) POST SURGERY        Steri-Strips have been placed over your incision site to give added support  Please continue to bathe the area as usual     Eventually the Steri-Strips will begin to peel off on the ends  Snip the raised ends off with scissors and let the rest fall off on their own  If you have any questions, please call our office   [phone]

## 2022-11-09 NOTE — PROGRESS NOTES
[address]  [address] [facility] [address] [address]  [phone]  [phone]     MRN: [id number] : 1945  Encounter: [id number]  Patient Information: Andree Hilario    Subjective:     43-year-old male presents for suture removal from previous excised basal cell carcinoma right lower leg     Objective:   Ht 5' 4" (1 626 m)   Wt 53 5 kg (118 lb)   BMI 20 25 kg/m²     Physical Exam:    General Appearance:    Alert, cooperative, no distress   Skin:  Previous site of excision healing well  Procedure:  Suture removal  Site of excision:  Right posterior  Wound was cleansed with saline  Wound is intact  Sutures removed  Steri-Strips applied  Biopsy pending       Assessment:     1  Basal cell carcinoma (BCC) of right lower leg           Plan:   Wound care instructions given to patient        Prior to Admission medications    Medication Sig Start Date End Date Taking? Authorizing Provider   cyanocobalamin (VITAMIN B-12) 1,000 mcg tablet Take by mouth daily    Historical Provider, MD   ezetimibe (ZETIA) 10 mg tablet TAKE 1 TABLET BY MOUTH EVERY DAY 22   Sheryl Barajas MD   Fluticasone-Salmeterol (Advair Diskus) 250-50 mcg/dose inhaler Inhale 1 puff 2 (two) times a day Rinse mouth after use  22   Sheryl Barajas MD   Multiple Vitamin (MULTIVITAMIN) capsule Take 1 capsule by mouth daily    Historical Provider, MD   rosuvastatin (CRESTOR) 10 MG tablet TAKE 1 TABLET BY MOUTH EVERY DAY 22   Sheryl Barajas MD     No Known Allergies    Monserrat Kline MD  2022,3:06 PM    Portions of the record may have been created with voice recognition software   Occasional wrong word or "sound a like" substitutions may have occurred due to the inherent limitations of voice recognition software   Read the chart carefully and recognize, using context, where substitutions have occurred

## 2022-11-14 ENCOUNTER — TELEPHONE (OUTPATIENT)
Dept: DERMATOLOGY | Facility: CLINIC | Age: 77
End: 2022-11-14

## 2022-11-14 NOTE — TELEPHONE ENCOUNTER
----- Message from Francesco Dunham MD sent at 11/11/2022  1:25 PM EST -----  Call patient if not coming in shortly for suture removal to let him know that the margins were clear    11/14/22 @ 01:49pm - Informed pt of normal path report

## 2023-01-09 DIAGNOSIS — J45.40 MODERATE PERSISTENT ASTHMA WITHOUT COMPLICATION: ICD-10-CM

## 2023-01-09 RX ORDER — FLUTICASONE PROPIONATE AND SALMETEROL 250; 50 UG/1; UG/1
1 POWDER RESPIRATORY (INHALATION) 2 TIMES DAILY
Qty: 60 BLISTER | Refills: 1 | Status: SHIPPED | OUTPATIENT
Start: 2023-01-09

## 2023-03-03 DIAGNOSIS — E78.2 MIXED HYPERLIPIDEMIA: ICD-10-CM

## 2023-03-06 RX ORDER — EZETIMIBE 10 MG/1
TABLET ORAL
Qty: 90 TABLET | Refills: 1 | Status: SHIPPED | OUTPATIENT
Start: 2023-03-06

## 2023-03-06 RX ORDER — ROSUVASTATIN CALCIUM 10 MG/1
TABLET, COATED ORAL
Qty: 90 TABLET | Refills: 1 | Status: SHIPPED | OUTPATIENT
Start: 2023-03-06

## 2023-03-09 DIAGNOSIS — J45.40 MODERATE PERSISTENT ASTHMA WITHOUT COMPLICATION: ICD-10-CM

## 2023-03-09 RX ORDER — FLUTICASONE PROPIONATE AND SALMETEROL 50; 250 UG/1; UG/1
POWDER RESPIRATORY (INHALATION)
Qty: 60 BLISTER | Refills: 1 | Status: SHIPPED | OUTPATIENT
Start: 2023-03-09

## 2023-03-10 ENCOUNTER — RA CDI HCC (OUTPATIENT)
Dept: OTHER | Facility: HOSPITAL | Age: 78
End: 2023-03-10

## 2023-03-10 NOTE — PROGRESS NOTES
[address]  coding opportunities       Chart reviewed, no opportunity found: CHART REVIEWED, NO OPPORTUNITY FOUND        Patients Insurance     Medicare Insurance: Medicare

## 2023-03-15 ENCOUNTER — OFFICE VISIT (OUTPATIENT)
Dept: FAMILY MEDICINE CLINIC | Facility: MEDICAL CENTER | Age: 78
End: 2023-03-15

## 2023-03-15 VITALS
HEIGHT: 64 IN | BODY MASS INDEX: 20.76 KG/M2 | WEIGHT: 121.6 LBS | HEART RATE: 56 BPM | RESPIRATION RATE: 16 BRPM | TEMPERATURE: 99 F

## 2023-03-15 DIAGNOSIS — J45.40 MODERATE PERSISTENT ASTHMA WITHOUT COMPLICATION: ICD-10-CM

## 2023-03-15 DIAGNOSIS — Z13.29 SCREENING FOR THYROID DISORDER: ICD-10-CM

## 2023-03-15 DIAGNOSIS — J30.1 CHRONIC SEASONAL ALLERGIC RHINITIS DUE TO POLLEN: ICD-10-CM

## 2023-03-15 DIAGNOSIS — Z13.1 SCREENING FOR DIABETES MELLITUS: ICD-10-CM

## 2023-03-15 DIAGNOSIS — Z13.0 SCREENING FOR IRON DEFICIENCY ANEMIA: ICD-10-CM

## 2023-03-15 DIAGNOSIS — Z13.220 SCREENING FOR LIPID DISORDERS: ICD-10-CM

## 2023-03-15 DIAGNOSIS — Z12.5 SCREENING FOR PROSTATE CANCER: ICD-10-CM

## 2023-03-15 DIAGNOSIS — E78.2 MIXED HYPERLIPIDEMIA: Primary | ICD-10-CM

## 2023-03-15 RX ORDER — FLUTICASONE PROPIONATE 50 MCG
1-2 SPRAY, SUSPENSION (ML) NASAL DAILY
Qty: 16 G | Refills: 3 | Status: SHIPPED | OUTPATIENT
Start: 2023-03-15

## 2023-03-15 NOTE — ASSESSMENT & PLAN NOTE
He is using Advair every day  He is not having any symptoms like wheezing, shortness of breath, chest pain  Continue Advair  We will also start Flonase to get his allergic rhinitis under control

## 2023-03-15 NOTE — ASSESSMENT & PLAN NOTE
His wife complains of constant runny nose  No fever, no cough, no sore throat  I am sure its allergic rhinitis because he also has a history of asthma  We will try Flonase every day  As needed Zyrtec or something like that

## 2023-03-15 NOTE — PROGRESS NOTES
Name: Toy Jacob      : 1945      MRN: [id number]  Encounter Provider: Son Adrian MD  Encounter Date: 3/15/2023   Encounter department: [address] [place] [address]    Assessment & Plan     1  Mixed hyperlipidemia  Assessment & Plan:  He is due for a lipid panel  He is taking Crestor and Zetia  Continue those medications, I will not recommend a low-fat diet because he is underweight  However he should probably avoid sausage, martines, processed meats  Orders:  -     Comprehensive metabolic panel; Future    2  Moderate persistent asthma without complication  Assessment & Plan:  He is using Advair every day  He is not having any symptoms like wheezing, shortness of breath, chest pain  Continue Advair  We will also start Flonase to get his allergic rhinitis under control  3  Chronic seasonal allergic rhinitis due to pollen  Assessment & Plan:  His wife complains of constant runny nose  No fever, no cough, no sore throat  I am sure its allergic rhinitis because he also has a history of asthma  We will try Flonase every day  As needed Zyrtec or something like that  Orders:  -     fluticasone (FLONASE) 50 mcg/act nasal spray; 1-2 sprays into each nostril daily    4  Screening for prostate cancer  -     PSA, Total Screen; Future    5  Screening for thyroid disorder  -     TSH, 3rd generation with Free T4 reflex; Future    6  Screening for lipid disorders  -     Lipid Panel with Direct LDL reflex; Future    7  Screening for iron deficiency anemia  -     CBC and differential; Future    8  Screening for diabetes mellitus  -     Comprehensive metabolic panel; Future           Subjective      This is a pleasant 50-year-old man  He lives with his wife  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[employer]   He is retired  Review of Systems   Constitutional: Negative for activity change, fatigue and fever     HENT: Negative for congestion, ear discharge, ear pain, postnasal drip, rhinorrhea, sinus pain, sneezing and sore throat  Eyes: Negative for photophobia, pain, discharge and redness  Respiratory: Negative for apnea, cough, shortness of breath and wheezing  Cardiovascular: Negative for chest pain and palpitations  Gastrointestinal: Negative for abdominal pain, blood in stool, constipation, diarrhea, nausea and vomiting  Endocrine: Negative for polydipsia, polyphagia and polyuria  Genitourinary: Negative for decreased urine volume, difficulty urinating, dysuria, frequency, penile discharge, penile pain and urgency  Musculoskeletal: Negative for arthralgias, gait problem, joint swelling and neck pain  Skin: Negative for color change and rash  He follows up with dermatologist, he has history of squamous cell carcinoma  He has a lot of AK's and dry skin  Neurological: Negative for dizziness, tremors, seizures, weakness and headaches  Psychiatric/Behavioral: Negative for agitation and sleep disturbance  The patient is not nervous/anxious  Current Outpatient Medications on File Prior to Visit   Medication Sig   • Advair Diskus 250-50 MCG/ACT inhaler INHALE 1 PUFF 2 TIMES A DAY RINSE MOUTH AFTER USE  • cyanocobalamin (VITAMIN B-12) 1,000 mcg tablet Take by mouth daily   • ezetimibe (ZETIA) 10 mg tablet TAKE 1 TABLET BY MOUTH EVERY DAY   • Multiple Vitamin (MULTIVITAMIN) capsule Take 1 capsule by mouth daily   • rosuvastatin (CRESTOR) 10 MG tablet TAKE 1 TABLET BY MOUTH EVERY DAY       Objective     Pulse 56   Temp 99 °F (37 2 °C)   Resp 16   Ht 5' 4" (1 626 m)   Wt 55 2 kg (121 lb 9 6 oz)   BMI 20 87 kg/m²     Physical Exam  Vitals and nursing note reviewed  Constitutional:       General: He is not in acute distress  Appearance: He is well-developed  He is not ill-appearing  HENT:      Head: Normocephalic and atraumatic        Right Ear: Tympanic membrane, ear canal and external ear normal       Left Ear: Tympanic membrane, ear canal and external ear normal  Nose: Nose normal  No congestion or rhinorrhea  Mouth/Throat:      Mouth: Mucous membranes are moist       Pharynx: No oropharyngeal exudate or posterior oropharyngeal erythema  Comments: Poor dentition  Eyes:      General: Lids are normal       Extraocular Movements: Extraocular movements intact  Conjunctiva/sclera: Conjunctivae normal       Pupils: Pupils are equal, round, and reactive to light  Neck:      Thyroid: No thyromegaly  Vascular: No carotid bruit  Cardiovascular:      Rate and Rhythm: Normal rate and regular rhythm  Pulses: Normal pulses  Heart sounds: Normal heart sounds, S1 normal and S2 normal  No murmur heard  Pulmonary:      Effort: Pulmonary effort is normal  No respiratory distress  Breath sounds: Normal breath sounds  No wheezing or rales  Abdominal:      General: Bowel sounds are normal       Palpations: Abdomen is soft  There is no mass  Tenderness: There is no abdominal tenderness  Musculoskeletal:         General: Normal range of motion  Cervical back: Normal range of motion and neck supple  Lymphadenopathy:      Cervical: No cervical adenopathy  Skin:     General: Skin is warm and dry  Coloration: Skin is not pale  Findings: No rash  Neurological:      Mental Status: He is alert and oriented to person, place, and time  Cranial Nerves: No cranial nerve deficit  Sensory: No sensory deficit  Deep Tendon Reflexes: Reflexes are normal and symmetric  Psychiatric:         Behavior: Behavior normal  Behavior is cooperative  Thought Content:  Thought content normal          Judgment: Judgment normal        Ashu Reagan MD

## 2023-03-20 ENCOUNTER — APPOINTMENT (OUTPATIENT)
Dept: LAB | Facility: MEDICAL CENTER | Age: 78
End: 2023-03-20

## 2023-03-20 DIAGNOSIS — Z13.29 SCREENING FOR THYROID DISORDER: ICD-10-CM

## 2023-03-20 DIAGNOSIS — Z13.0 SCREENING FOR IRON DEFICIENCY ANEMIA: ICD-10-CM

## 2023-03-20 DIAGNOSIS — E78.2 MIXED HYPERLIPIDEMIA: ICD-10-CM

## 2023-03-20 DIAGNOSIS — Z13.1 SCREENING FOR DIABETES MELLITUS: ICD-10-CM

## 2023-03-20 DIAGNOSIS — Z12.5 SCREENING FOR PROSTATE CANCER: ICD-10-CM

## 2023-03-20 DIAGNOSIS — Z13.220 SCREENING FOR LIPID DISORDERS: ICD-10-CM

## 2023-03-20 LAB
ALBUMIN SERPL BCP-MCNC: 3.5 G/DL (ref 3.5–5)
ALP SERPL-CCNC: 64 U/L (ref 46–116)
ALT SERPL W P-5'-P-CCNC: 27 U/L (ref 12–78)
ANION GAP SERPL CALCULATED.3IONS-SCNC: 1 MMOL/L (ref 4–13)
AST SERPL W P-5'-P-CCNC: 19 U/L (ref 5–45)
BASOPHILS # BLD AUTO: 0.08 THOUSANDS/ÂΜL (ref 0–0.1)
BASOPHILS NFR BLD AUTO: 1 % (ref 0–1)
BILIRUB SERPL-MCNC: 0.4 MG/DL (ref 0.2–1)
BUN SERPL-MCNC: 14 MG/DL (ref 5–25)
CALCIUM SERPL-MCNC: 9.1 MG/DL (ref 8.3–10.1)
CHLORIDE SERPL-SCNC: 108 MMOL/L (ref 96–108)
CHOLEST SERPL-MCNC: 140 MG/DL
CO2 SERPL-SCNC: 28 MMOL/L (ref 21–32)
CREAT SERPL-MCNC: 0.71 MG/DL (ref 0.6–1.3)
EOSINOPHIL # BLD AUTO: 0.48 THOUSAND/ÂΜL (ref 0–0.61)
EOSINOPHIL NFR BLD AUTO: 8 % (ref 0–6)
ERYTHROCYTE [DISTWIDTH] IN BLOOD BY AUTOMATED COUNT: 12.2 % (ref 11.6–15.1)
GFR SERPL CREATININE-BSD FRML MDRD: 89 ML/MIN/1.73SQ M
GLUCOSE P FAST SERPL-MCNC: 80 MG/DL (ref 65–99)
HCT VFR BLD AUTO: 43.4 % (ref 36.5–49.3)
HDLC SERPL-MCNC: 55 MG/DL
HGB BLD-MCNC: 14.2 G/DL (ref 12–17)
IMM GRANULOCYTES # BLD AUTO: 0.02 THOUSAND/UL (ref 0–0.2)
IMM GRANULOCYTES NFR BLD AUTO: 0 % (ref 0–2)
LDLC SERPL CALC-MCNC: 73 MG/DL (ref 0–100)
LYMPHOCYTES # BLD AUTO: 1.71 THOUSANDS/ÂΜL (ref 0.6–4.47)
LYMPHOCYTES NFR BLD AUTO: 28 % (ref 14–44)
MCH RBC QN AUTO: 31.6 PG (ref 26.8–34.3)
MCHC RBC AUTO-ENTMCNC: 32.7 G/DL (ref 31.4–37.4)
MCV RBC AUTO: 97 FL (ref 82–98)
MONOCYTES # BLD AUTO: 0.6 THOUSAND/ÂΜL (ref 0.17–1.22)
MONOCYTES NFR BLD AUTO: 10 % (ref 4–12)
NEUTROPHILS # BLD AUTO: 3.23 THOUSANDS/ÂΜL (ref 1.85–7.62)
NEUTS SEG NFR BLD AUTO: 53 % (ref 43–75)
NRBC BLD AUTO-RTO: 0 /100 WBCS
PLATELET # BLD AUTO: 243 THOUSANDS/UL (ref 149–390)
PMV BLD AUTO: 10 FL (ref 8.9–12.7)
POTASSIUM SERPL-SCNC: 4.5 MMOL/L (ref 3.5–5.3)
PROT SERPL-MCNC: 7.2 G/DL (ref 6.4–8.4)
PSA SERPL-MCNC: 0.6 NG/ML (ref 0–4)
RBC # BLD AUTO: 4.49 MILLION/UL (ref 3.88–5.62)
SODIUM SERPL-SCNC: 137 MMOL/L (ref 135–147)
TRIGL SERPL-MCNC: 60 MG/DL
TSH SERPL DL<=0.05 MIU/L-ACNC: 3.32 UIU/ML (ref 0.45–4.5)
WBC # BLD AUTO: 6.12 THOUSAND/UL (ref 4.31–10.16)

## 2023-04-27 ENCOUNTER — OFFICE VISIT (OUTPATIENT)
Age: 78
End: 2023-04-27

## 2023-04-27 VITALS — WEIGHT: 121 LBS | HEIGHT: 64 IN | BODY MASS INDEX: 20.66 KG/M2 | TEMPERATURE: 98.1 F

## 2023-04-27 DIAGNOSIS — Z85.828 HISTORY OF SKIN CANCER: ICD-10-CM

## 2023-04-27 DIAGNOSIS — L82.1 SEBORRHEIC KERATOSIS: Primary | ICD-10-CM

## 2023-04-27 DIAGNOSIS — Z13.89 SCREENING FOR SKIN CONDITION: ICD-10-CM

## 2023-04-27 NOTE — PATIENT INSTRUCTIONS
Seborrheic keratosis patient reassured these are normal growths we acquire with age no treatment needed  History of skin cancer in no recurrence nothing else atypical sunblock recommended follow-up in 6 months  Screening for dermatologic disorders nothing else of concern noted on complete exam follow-up in 6 months lesion on the upper arm looks more like an irritant than a potential skin cancer at this time we will keep an eye on this if it does not resolve or gets larger we will need to biopsy

## 2023-04-27 NOTE — PROGRESS NOTES
[facility]ERMATOLOGY [facility]  [address] [facility] [address]  [phone]  [phone]     MRN: [id number] : 1945  Encounter: [id number]  Patient Information: Fred Lazaro  Chief complaint: 6 month check up    History of present illness: 58-year-old male with previous history of nonmelanoma skin cancer presents for overall checkup  No specific complaints noted  Past Medical History:   Diagnosis Date   • Asthma    • Malignant neoplasm of skin    • Nonmelanoma skin cancer     Last assessed 2017      Past Surgical History:   Procedure Laterality Date   • CATARACT EXTRACTION     • EYE SURGERY      Cataracts   • HERNIA REPAIR     • VA RPR 1ST INGUN HRNA AGE 5 YRS/> REDUCIBLE Left 2016    Procedure: INGUINAL HERNIA REPAIR ;  Surgeon: Elvira Carr MD;  Location: [facility];  Service: General     Social History   Social History     Substance and Sexual Activity   Alcohol Use No     Social History     Substance and Sexual Activity   Drug Use No     Social History     Tobacco Use   Smoking Status Former   • Packs/day: 0 00   • Years: 0 00   • Pack years: 0 00   • Types: Cigarettes   Smokeless Tobacco Never   Tobacco Comments    [employer] service     Family History   Problem Relation Age of Onset   • Coronary artery disease Father    • No Known Problems Sister    • No Known Problems Brother    • Prostate cancer Brother    • No Known Problems Son      Meds/Allergies   No Known Allergies    Meds:  Prior to Admission medications    Medication Sig Start Date End Date Taking?  Authorizing Provider   Advair Diskus 250-50 MCG/ACT inhaler INHALE 1 PUFF 2 TIMES A DAY RINSE MOUTH AFTER USE  3/9/23  Yes Tonya Medel MD   cyanocobalamin (VITAMIN B-12) 1,000 mcg tablet Take by mouth daily   Yes Historical Provider, MD   ezetimibe (ZETIA) 10 mg tablet TAKE 1 TABLET BY MOUTH EVERY DAY 3/6/23  Yes Tonya Medel MD   fluticasone (FLONASE) 50 mcg/act nasal spray 1-2 sprays into each nostril daily "3/15/23  Yes Gloria Adler MD   Multiple Vitamin (MULTIVITAMIN) capsule Take 1 capsule by mouth daily   Yes Historical Provider, MD   rosuvastatin (CRESTOR) 10 MG tablet TAKE 1 TABLET BY MOUTH EVERY DAY 3/6/23  Yes Gloria Adler MD       Subjective:     Review of Systems:    General: negative for - chills, fatigue, fever,  weight gain or weight loss  Psychological: negative for - anxiety, behavioral disorder, concentration difficulties, decreased libido, depression, irritability, memory difficulties, mood swings, sleep disturbances or suicidal ideation  ENT: negative for - hearing difficulties , nasal congestion, nasal discharge, oral lesions, sinus pain, sneezing, sore throat  Allergy and Immunology: negative for - hives, insect bite sensitivity,  Hematological and Lymphatic: negative for - bleeding problems, blood clots,bruising, swollen lymph nodes  Endocrine: negative for - hair pattern changes, hot flashes, malaise/lethargy, mood swings, palpitations, polydipsia/polyuria, skin changes, temperature intolerance or unexpected weight change  Respiratory: negative for - cough, hemoptysis, orthopnea, shortness of breath, or wheezing  Cardiovascular: negative for - chest pain, dyspnea on exertion, edema,  Gastrointestinal: negative for - abdominal pain, nausea/vomiting  Genito-Urinary: negative for - dysuria, incontinence, irregular/heavy menses or urinary frequency/urgency  Musculoskeletal: negative for - gait disturbance, joint pain, joint stiffness, joint swelling, muscle pain, muscular weakness  Dermatological:  As in HPI  Neurological: negative for confusion, dizziness, headaches, impaired coordination/balance, memory loss, numbness/tingling, seizures, speech problems, tremors or weakness       Objective:   Temp 98 1 °F (36 7 °C) (Temporal)   Ht 5' 4\" (1 626 m)   Wt 54 9 kg (121 lb)   BMI 20 77 kg/m²     Physical Exam:    General Appearance:    Alert, cooperative, no distress   Head:    Normocephalic, without " "obvious abnormality, atraumatic           Skin:   A full skin exam was performed including scalp, head scalp, eyes, ears, nose, lips, neck, chest, axilla, abdomen, back, buttocks, bilateral upper extremities, bilateral lower extremities, hands, feet, fingers, toes, fingernails, and toenails with tenderness scaly patch noted on the right upper arm measures about over centimeter in size normal keratotic papules greasy stuck on appearance nothing else atypical no exam previously to skin cancer well-healed without recurrence     Assessment:     1  Seborrheic keratosis        2  History of skin cancer        3  Screening for skin condition              Plan:   Seborrheic keratosis patient reassured these are normal growths we acquire with age no treatment needed  History of skin cancer in no recurrence nothing else atypical sunblock recommended follow-up in 6 months  Screening for dermatologic disorders nothing else of concern noted on complete exam follow-up in 6 months lesion on the upper arm looks more like an irritant than a potential skin cancer at this time we will keep an eye on this if it does not resolve or gets larger we will need to biopsy    Radha Gustafson MD  1/76/6175,1:98 PM    Portions of the record may have been created with voice recognition software   Occasional wrong word or \"sound a like\" substitutions may have occurred due to the inherent limitations of voice recognition software   Read the chart carefully and recognize, using context, where substitutions have occurred    "

## 2023-04-27 NOTE — PROGRESS NOTES
"[facility] Note     Patient Name: Bushra Dennis  Encounter Date: 04/27/2023     Have you been cared for by a [facility] Dermatologist in the last 3 years and, if so, which description applies to you? Yes  I have been here within the last 3 years, and my medical history has NOT changed since that time  I am MALE/not capable of bearing children  REVIEW OF SYSTEMS:  Have you recently had or currently have any of the following? · No changes in my recent health  PAST MEDICAL HISTORY:  Have you personally ever had or currently have any of the following? If \"YES,\" then please provide more detail  · No changes in my medical history  FAMILY HISTORY:  Any \"first degree relatives\" (parent, brother, sister, or child) with the following? • No changes in my family's known health  PATIENT EXPERIENCE:    • Do you want the Dermatologist to perform a COMPLETE skin exam today including a clinical examination under the \"bra and underwear\" areas? Yes  • If necessary, do we have your permission to call and leave a detailed message on your Preferred Phone number that includes your specific medical information?   Yes      No Known Allergies   Current Outpatient Medications:   •  Advair Diskus 250-50 MCG/ACT inhaler, INHALE 1 PUFF 2 TIMES A DAY RINSE MOUTH AFTER USE , Disp: 60 blister, Rfl: 1  •  cyanocobalamin (VITAMIN B-12) 1,000 mcg tablet, Take by mouth daily, Disp: , Rfl:   •  ezetimibe (ZETIA) 10 mg tablet, TAKE 1 TABLET BY MOUTH EVERY DAY, Disp: 90 tablet, Rfl: 1  •  fluticasone (FLONASE) 50 mcg/act nasal spray, 1-2 sprays into each nostril daily, Disp: 16 g, Rfl: 3  •  Multiple Vitamin (MULTIVITAMIN) capsule, Take 1 capsule by mouth daily, Disp: , Rfl:   •  rosuvastatin (CRESTOR) 10 MG tablet, TAKE 1 TABLET BY MOUTH EVERY DAY, Disp: 90 tablet, Rfl: 1          • Whom besides the patient is providing clinical information about today's encounter?   o NO ADDITIONAL HISTORIAN (patient alone " provided history)    Physical Exam and Assessment/Plan by Diagnosis:

## 2023-05-02 ENCOUNTER — TELEPHONE (OUTPATIENT)
Dept: FAMILY MEDICINE CLINIC | Facility: MEDICAL CENTER | Age: 78
End: 2023-05-02

## 2023-05-02 DIAGNOSIS — J45.40 MODERATE PERSISTENT ASTHMA WITHOUT COMPLICATION: ICD-10-CM

## 2023-05-02 RX ORDER — FLUTICASONE PROPIONATE AND SALMETEROL 50; 250 UG/1; UG/1
POWDER RESPIRATORY (INHALATION)
Qty: 60 BLISTER | Refills: 1 | Status: SHIPPED | OUTPATIENT
Start: 2023-05-02

## 2023-07-02 DIAGNOSIS — J45.40 MODERATE PERSISTENT ASTHMA WITHOUT COMPLICATION: ICD-10-CM

## 2023-07-03 RX ORDER — FLUTICASONE PROPIONATE AND SALMETEROL 50; 250 UG/1; UG/1
POWDER RESPIRATORY (INHALATION)
Qty: 60 BLISTER | Refills: 1 | Status: SHIPPED | OUTPATIENT
Start: 2023-07-03

## 2023-07-05 ENCOUNTER — OFFICE VISIT (OUTPATIENT)
Dept: FAMILY MEDICINE CLINIC | Facility: MEDICAL CENTER | Age: 78
End: 2023-07-05
Payer: MEDICARE

## 2023-07-05 VITALS
RESPIRATION RATE: 16 BRPM | DIASTOLIC BLOOD PRESSURE: 80 MMHG | BODY MASS INDEX: 21.56 KG/M2 | HEART RATE: 61 BPM | TEMPERATURE: 98.7 F | SYSTOLIC BLOOD PRESSURE: 124 MMHG | WEIGHT: 125.6 LBS

## 2023-07-05 DIAGNOSIS — E78.2 MIXED HYPERLIPIDEMIA: ICD-10-CM

## 2023-07-05 DIAGNOSIS — J45.40 MODERATE PERSISTENT ASTHMA WITHOUT COMPLICATION: ICD-10-CM

## 2023-07-05 DIAGNOSIS — J30.1 CHRONIC SEASONAL ALLERGIC RHINITIS DUE TO POLLEN: Primary | ICD-10-CM

## 2023-07-05 PROCEDURE — 99214 OFFICE O/P EST MOD 30 MIN: CPT | Performed by: FAMILY MEDICINE

## 2023-07-05 NOTE — ASSESSMENT & PLAN NOTE
His last LDL, in March, was 72. He is taking Zetia and rosuvastatin. Continue both medications, continue exercise, which he does, low-fat diet.

## 2023-07-05 NOTE — ASSESSMENT & PLAN NOTE
He is responding well to Flonase. He is taking 1 or 2 sprays every day. Him and his wife are delighted with the response. Continue Flonase.

## 2023-07-05 NOTE — PROGRESS NOTES
Name: Ashwini Cool      : 1945      MRN: [id number]  Encounter Provider: John Baron MD  Encounter Date: 2023   Encounter department: [address] [place] [address]    Assessment & Plan     1. Chronic seasonal allergic rhinitis due to pollen  Assessment & Plan:  He is responding well to Flonase. He is taking 1 or 2 sprays every day. Him and his wife are delighted with the response. Continue Flonase. 2. Moderate persistent asthma without complication  Assessment & Plan:  He is using Advair every day. No rescue inhaler needed. Continue Advair. 3. Mixed hyperlipidemia  Assessment & Plan:  His last LDL, in March, was 72. He is taking Zetia and rosuvastatin. Continue both medications, continue exercise, which he does, low-fat diet. Subjective      Review of Systems   Constitutional: Negative for activity change, fatigue and fever. HENT: Negative for congestion, ear discharge, ear pain, postnasal drip, rhinorrhea, sinus pain, sneezing and sore throat. Eyes: Negative for photophobia, pain, discharge and redness. Respiratory: Negative for apnea, cough, shortness of breath and wheezing. Cardiovascular: Negative for chest pain and palpitations. Gastrointestinal: Negative for abdominal pain, blood in stool, constipation, diarrhea, nausea and vomiting. Endocrine: Negative for polydipsia, polyphagia and polyuria. Genitourinary: Negative for decreased urine volume, difficulty urinating, dysuria, frequency, penile discharge, penile pain and urgency. Musculoskeletal: Negative for arthralgias, gait problem, joint swelling and neck pain. Skin: Negative for color change and rash. Neurological: Negative for dizziness, tremors, seizures, weakness and headaches. Psychiatric/Behavioral: Negative for agitation and sleep disturbance. The patient is not nervous/anxious.         Current Outpatient Medications on File Prior to Visit   Medication Sig   • Advair Diskus 250-50 MCG/ACT inhaler INHALE 1 DOSE BY MOUTH TWICE DAILY. RINSE MOUTH AFTER USE   • cyanocobalamin (VITAMIN B-12) 1,000 mcg tablet Take by mouth daily   • ezetimibe (ZETIA) 10 mg tablet TAKE 1 TABLET BY MOUTH EVERY DAY   • fluticasone (FLONASE) 50 mcg/act nasal spray 1-2 sprays into each nostril daily   • Multiple Vitamin (MULTIVITAMIN) capsule Take 1 capsule by mouth daily   • rosuvastatin (CRESTOR) 10 MG tablet TAKE 1 TABLET BY MOUTH EVERY DAY       Objective     /80 (Cuff Size: Standard)   Pulse 61   Temp 98.7 °F (37.1 °C)   Resp 16   Wt 57 kg (125 lb 9.6 oz)   BMI 21.56 kg/m²     Physical Exam  Vitals and nursing note reviewed. Constitutional:       General: He is not in acute distress. Appearance: Normal appearance. He is well-developed. He is not ill-appearing. HENT:      Head: Normocephalic and atraumatic. Right Ear: Tympanic membrane, ear canal and external ear normal.      Left Ear: Tympanic membrane, ear canal and external ear normal.      Nose: Nose normal. No congestion or rhinorrhea. Mouth/Throat:      Mouth: Mucous membranes are moist.   Eyes:      General: Lids are normal.      Extraocular Movements: Extraocular movements intact. Conjunctiva/sclera: Conjunctivae normal.      Pupils: Pupils are equal, round, and reactive to light. Neck:      Thyroid: No thyromegaly. Vascular: No carotid bruit. Cardiovascular:      Rate and Rhythm: Normal rate and regular rhythm. Pulses: Normal pulses. Heart sounds: Normal heart sounds, S1 normal and S2 normal. No murmur heard. Pulmonary:      Effort: Pulmonary effort is normal. No respiratory distress. Breath sounds: Normal breath sounds. No wheezing or rales. Abdominal:      General: Bowel sounds are normal.      Palpations: Abdomen is soft. There is no mass. Tenderness: There is no abdominal tenderness. Musculoskeletal:         General: Normal range of motion.       Cervical back: Normal range of motion and neck supple. Lymphadenopathy:      Cervical: No cervical adenopathy. Skin:     General: Skin is warm and dry. Coloration: Skin is not pale. Findings: No rash. Neurological:      Mental Status: He is alert and oriented to person, place, and time. Cranial Nerves: No cranial nerve deficit. Sensory: No sensory deficit. Deep Tendon Reflexes: Reflexes are normal and symmetric. Psychiatric:         Behavior: Behavior normal. Behavior is cooperative. Thought Content:  Thought content normal.         Judgment: Judgment normal.       Monty Menezes MD

## 2023-08-27 DIAGNOSIS — E78.2 MIXED HYPERLIPIDEMIA: ICD-10-CM

## 2023-08-28 RX ORDER — ROSUVASTATIN CALCIUM 10 MG/1
TABLET, COATED ORAL
Qty: 90 TABLET | Refills: 1 | Status: SHIPPED | OUTPATIENT
Start: 2023-08-28

## 2023-08-28 RX ORDER — EZETIMIBE 10 MG/1
TABLET ORAL
Qty: 90 TABLET | Refills: 1 | Status: SHIPPED | OUTPATIENT
Start: 2023-08-28

## 2023-09-05 DIAGNOSIS — J45.40 MODERATE PERSISTENT ASTHMA WITHOUT COMPLICATION: ICD-10-CM

## 2023-09-05 RX ORDER — FLUTICASONE PROPIONATE AND SALMETEROL 50; 250 UG/1; UG/1
POWDER RESPIRATORY (INHALATION)
Qty: 60 BLISTER | Refills: 1 | Status: SHIPPED | OUTPATIENT
Start: 2023-09-05

## 2023-09-19 ENCOUNTER — OFFICE VISIT (OUTPATIENT)
Dept: FAMILY MEDICINE CLINIC | Facility: MEDICAL CENTER | Age: 78
End: 2023-09-19
Payer: MEDICARE

## 2023-09-19 VITALS
WEIGHT: 122 LBS | OXYGEN SATURATION: 97 % | SYSTOLIC BLOOD PRESSURE: 128 MMHG | BODY MASS INDEX: 20.83 KG/M2 | DIASTOLIC BLOOD PRESSURE: 74 MMHG | HEIGHT: 64 IN | HEART RATE: 58 BPM

## 2023-09-19 DIAGNOSIS — J45.40 MODERATE PERSISTENT ASTHMA WITHOUT COMPLICATION: ICD-10-CM

## 2023-09-19 DIAGNOSIS — E78.2 MIXED HYPERLIPIDEMIA: ICD-10-CM

## 2023-09-19 DIAGNOSIS — Z00.00 PREVENTATIVE HEALTH CARE: Primary | ICD-10-CM

## 2023-09-19 DIAGNOSIS — J30.1 CHRONIC SEASONAL ALLERGIC RHINITIS DUE TO POLLEN: ICD-10-CM

## 2023-09-19 PROCEDURE — 99213 OFFICE O/P EST LOW 20 MIN: CPT | Performed by: FAMILY MEDICINE

## 2023-09-19 PROCEDURE — G0439 PPPS, SUBSEQ VISIT: HCPCS | Performed by: FAMILY MEDICINE

## 2023-09-19 RX ORDER — FLUTICASONE PROPIONATE 50 MCG
1-2 SPRAY, SUSPENSION (ML) NASAL DAILY
Qty: 16 G | Refills: 3 | Status: SHIPPED | OUTPATIENT
Start: 2023-09-19

## 2023-09-19 NOTE — PROGRESS NOTES
Assessment and Plan:     Problem List Items Addressed This Visit        Respiratory    Moderate persistent asthma without complication     He is using Advair. It has been effective and well-tolerated. He its been a long time since he needed a rescue inhaler. Continue Advair. Chronic seasonal allergic rhinitis due to pollen     It is worse in the fall. He is taking fluticasone nasal spray every day and it is working well. Relevant Medications    fluticasone (FLONASE) 50 mcg/act nasal spray       Other    Mixed hyperlipidemia     He is taking rosuvastatin and Zetia. His triglycerides and cholesterol is well below goal.    Continue those medications, continue low-carb low-fat diet. Other Visit Diagnoses     Preventative health care    -  Primary           Preventive health issues were discussed with patient, and age appropriate screening tests were ordered as noted in patient's After Visit Summary. Personalized health advice and appropriate referrals for health education or preventive services given if needed, as noted in patient's After Visit Summary. History of Present Illness:     Patient presents for a Medicare Wellness Visit    This is a pleasant 50-year-old man. He is a retired [employer] . He is  and lives with his wife. They have 1 adult son. And he is healthy and alive. He is up-to-date with colonoscopy and prostate screening     Patient Care Team:  Jaime Manzanares MD as PCP - General (Family Medicine)  MD Sea Mckinney Cea, MD     Review of Systems:     Review of Systems   Constitutional: Negative for activity change, fatigue and fever. HENT: Positive for dental problem. Negative for congestion, ear discharge, ear pain, postnasal drip, rhinorrhea, sinus pain, sneezing and sore throat. Eyes: Negative for photophobia, pain, discharge and redness. Respiratory: Negative for apnea, cough, shortness of breath and wheezing.     Cardiovascular: Negative for chest pain and palpitations. Gastrointestinal: Negative for abdominal pain, blood in stool, constipation, diarrhea, nausea and vomiting. Endocrine: Negative for polydipsia, polyphagia and polyuria. Genitourinary: Negative for decreased urine volume, difficulty urinating, dysuria, frequency, penile discharge, penile pain and urgency. Musculoskeletal: Negative for arthralgias, gait problem, joint swelling and neck pain. Skin: Negative for color change and rash. Neurological: Negative for dizziness, tremors, seizures, weakness and headaches. Psychiatric/Behavioral: Negative for agitation and sleep disturbance. The patient is not nervous/anxious.          Problem List:     Patient Active Problem List   Diagnosis   • Actinic keratosis   • Seborrheic keratosis   • Squamous cell carcinoma of back   • Moderate persistent asthma without complication   • Mixed hyperlipidemia   • Chronic seasonal allergic rhinitis due to pollen      Past Medical and Surgical History:     Past Medical History:   Diagnosis Date   • Asthma    • Malignant neoplasm of skin    • Nonmelanoma skin cancer     Last assessed 6/27/2017      Past Surgical History:   Procedure Laterality Date   • CATARACT EXTRACTION     • EYE SURGERY  2010    Cataracts   • HERNIA REPAIR  2016   • TX RPR 1ST INGUN HRNA AGE 5 YRS/> REDUCIBLE Left 7/21/2016    Procedure: INGUINAL HERNIA REPAIR ;  Surgeon: Rodney Gallagher MD;  Location: [facility];  Service: General      Family History:     Family History   Problem Relation Age of Onset   • Coronary artery disease Father    • No Known Problems Sister    • No Known Problems Brother    • Prostate cancer Brother    • No Known Problems Son       Social History:     Social History     Socioeconomic History   • Marital status: /Civil Union     Spouse name: None   • Number of children: None   • Years of education: None   • Highest education level: None   Occupational History   • None   Tobacco Use   • Smoking status: Former     Packs/day: 0.00     Years: 0.00     Total pack years: 0.00     Types: Cigarettes   • Smokeless tobacco: Never   • Tobacco comments:     [employer] service   Substance and Sexual Activity   • Alcohol use: No   • Drug use: No   • Sexual activity: Yes     Partners: Female     Comment:    Other Topics Concern   • None   Social History Narrative    Never a smoker - As per [employer]      Social Determinants of Health     Financial Resource Strain: Low Risk  (9/19/2023)    Overall Financial Resource Strain (CARDIA)    • Difficulty of Paying Living Expenses: Not very hard   Food Insecurity: Not on file   Transportation Needs: No Transportation Needs (9/19/2023)    PRAPARE - Transportation    • Lack of Transportation (Medical): No    • Lack of Transportation (Non-Medical): No   Physical Activity: Not on file   Stress: Not on file   Social Connections: Not on file   Intimate Partner Violence: Not on file   Housing Stability: Not on file      Medications and Allergies:     Current Outpatient Medications   Medication Sig Dispense Refill   • Advair Diskus 250-50 MCG/ACT inhaler TAKE 1 PUFF BY MOUTH TWICE A DAY *RINSE MOUTH AFTER USE* 60 blister 1   • cyanocobalamin (VITAMIN B-12) 1,000 mcg tablet Take by mouth daily     • ezetimibe (ZETIA) 10 mg tablet TAKE 1 TABLET BY MOUTH EVERY DAY 90 tablet 1   • fluticasone (FLONASE) 50 mcg/act nasal spray 1-2 sprays into each nostril daily 16 g 3   • Multiple Vitamin (MULTIVITAMIN) capsule Take 1 capsule by mouth daily     • rosuvastatin (CRESTOR) 10 MG tablet TAKE 1 TABLET BY MOUTH EVERY DAY 90 tablet 1     No current facility-administered medications for this visit.      No Known Allergies   Immunizations:     Immunization History   Administered Date(s) Administered   • COVID-19 MODERNA VACC 0.5 ML IM 01/30/2021, 02/25/2021   • COVID-19 PFIZER VACCINE 0.3 ML IM 10/29/2021   • COVID-19 Pfizer Vac BIVALENT Charly-sucrose 12 Yr+ IM 05/04/2023   • COVID-19 BusyEvent vac (Charly-sucrose, gray cap) 12 yr+ IM 03/31/2022   • INFLUENZA 10/20/2014, 11/03/2018, 09/17/2020   • Influenza Split High Dose Preservative Free IM 10/15/2019   • Pneumococcal Polysaccharide PPV23 11/03/2018   • Td (adult), adsorbed 05/12/2007      Health Maintenance:         Topic Date Due   • Hepatitis C Screening  Never done         Topic Date Due   • Pneumococcal Vaccine: 65+ Years (2 - PCV) 11/03/2019   • COVID-19 Vaccine (6 - Moderna series) 09/04/2023   • Influenza Vaccine (1) 09/01/2023      Medicare Screening Tests and Risk Assessments:     Joon Celestin is here for his Subsequent Wellness visit. Health Risk Assessment:   Patient rates overall health as very good. Patient feels that their physical health rating is same. Patient is very satisfied with their life. Eyesight was rated as same. Hearing was rated as same. Patient feels that their emotional and mental health rating is same. Patients states they are never, rarely angry. Patient states they are never, rarely unusually tired/fatigued. Pain experienced in the last 7 days has been none. Patient states that he has experienced no weight loss or gain in last 6 months. Fall Risk Screening: In the past year, patient has experienced: no history of falling in past year      Home Safety:  Patient does not have trouble with stairs inside or outside of their home. Patient has working smoke alarms and has working carbon monoxide detector. Home safety hazards include: none. Nutrition:   Current diet is Regular. Medications:   Patient is currently taking over-the-counter supplements. OTC medications include: see medication list. Patient is able to manage medications. Activities of Daily Living (ADLs)/Instrumental Activities of Daily Living (IADLs):   Walk and transfer into and out of bed and chair?: Yes  Dress and groom yourself?: Yes    Bathe or shower yourself?: Yes    Feed yourself?  Yes  Do your laundry/housekeeping?: Yes  Manage your money, pay your bills and track your expenses?: Yes  Make your own meals?: Yes    Do your own shopping?: Yes    Previous Hospitalizations:   Any hospitalizations or ED visits within the last 12 months?: No      Advance Care Planning:   Living will: No    Durable POA for healthcare: No    Advanced directive: No      Cognitive Screening:   Provider or family/friend/caregiver concerned regarding cognition?: No    PREVENTIVE SCREENINGS      Cardiovascular Screening:    General: Screening Not Indicated and History Lipid Disorder      Diabetes Screening:     General: Screening Current      Prostate Cancer Screening:    General: Screening Not Indicated      Abdominal Aortic Aneurysm (AAA) Screening:    Risk factors include: tobacco use        Lung Cancer Screening:     General: Screening Not Indicated    Screening, Brief Intervention, and Referral to Treatment (SBIRT)    Screening  Typical number of drinks in a day: 0    Single Item Drug Screening:  How often have you used an illegal drug (including marijuana) or a prescription medication for non-medical reasons in the past year? never    Single Item Drug Screen Score: 0  Interpretation: Negative screen for possible drug use disorder    No results found. Physical Exam:     /74 (BP Location: Left arm, Patient Position: Sitting, Cuff Size: Adult)   Pulse 58   Ht 5' 4" (1.626 m)   Wt 55.3 kg (122 lb)   SpO2 97%   BMI 20.94 kg/m²     Physical Exam  Vitals and nursing note reviewed. Constitutional:       General: He is not in acute distress. Appearance: Normal appearance. He is well-developed. He is not ill-appearing. HENT:      Head: Normocephalic. Right Ear: Tympanic membrane, ear canal and external ear normal.      Left Ear: Tympanic membrane, ear canal and external ear normal.      Nose: Nose normal. No rhinorrhea. Mouth/Throat:      Mouth: Mucous membranes are moist.      Pharynx: Uvula midline. No oropharyngeal exudate.       Tonsils: No tonsillar exudate. Comments: Poor dentition  Eyes:      General: Lids are normal.      Extraocular Movements: Extraocular movements intact. Conjunctiva/sclera: Conjunctivae normal.      Pupils: Pupils are equal, round, and reactive to light. Neck:      Thyroid: No thyromegaly. Vascular: No carotid bruit. Trachea: Trachea normal.   Cardiovascular:      Rate and Rhythm: Normal rate and regular rhythm. Pulses: Normal pulses. Heart sounds: Normal heart sounds, S1 normal and S2 normal. No murmur heard. Pulmonary:      Effort: Pulmonary effort is normal. No respiratory distress. Breath sounds: Normal breath sounds. Abdominal:      General: Bowel sounds are normal.      Palpations: Abdomen is soft. Tenderness: There is no abdominal tenderness. Hernia: No hernia is present. Musculoskeletal:         General: Normal range of motion. Cervical back: Normal range of motion and neck supple. Lymphadenopathy:      Cervical: No cervical adenopathy. Skin:     General: Skin is warm and dry. Neurological:      General: No focal deficit present. Mental Status: He is alert and oriented to person, place, and time. Cranial Nerves: No cranial nerve deficit. Sensory: No sensory deficit. Gait: Gait normal.      Deep Tendon Reflexes: Reflexes are normal and symmetric. Psychiatric:         Speech: Speech normal.         Behavior: Behavior normal. Behavior is cooperative. Thought Content:  Thought content normal.          Allen Jacques MD

## 2023-09-19 NOTE — ASSESSMENT & PLAN NOTE
He is taking rosuvastatin and Zetia. His triglycerides and cholesterol is well below goal.    Continue those medications, continue low-carb low-fat diet.

## 2023-09-19 NOTE — ASSESSMENT & PLAN NOTE
He is using Advair. It has been effective and well-tolerated. He its been a long time since he needed a rescue inhaler. Continue Advair.

## 2023-10-14 DIAGNOSIS — J30.1 CHRONIC SEASONAL ALLERGIC RHINITIS DUE TO POLLEN: ICD-10-CM

## 2023-10-16 RX ORDER — FLUTICASONE PROPIONATE 50 MCG
SPRAY, SUSPENSION (ML) NASAL
Qty: 48 ML | Refills: 2 | Status: SHIPPED | OUTPATIENT
Start: 2023-10-16

## 2023-11-13 DIAGNOSIS — J45.40 MODERATE PERSISTENT ASTHMA WITHOUT COMPLICATION: ICD-10-CM

## 2023-11-13 RX ORDER — FLUTICASONE PROPIONATE AND SALMETEROL 50; 250 UG/1; UG/1
POWDER RESPIRATORY (INHALATION)
Qty: 60 BLISTER | Refills: 1 | Status: SHIPPED | OUTPATIENT
Start: 2023-11-13

## 2024-01-25 DIAGNOSIS — J45.40 MODERATE PERSISTENT ASTHMA WITHOUT COMPLICATION: ICD-10-CM

## 2024-01-25 RX ORDER — FLUTICASONE PROPIONATE AND SALMETEROL 250; 50 UG/1; UG/1
1 POWDER RESPIRATORY (INHALATION) 2 TIMES DAILY
Qty: 60 BLISTER | Refills: 5 | Status: SHIPPED | OUTPATIENT
Start: 2024-01-25

## 2024-01-25 NOTE — TELEPHONE ENCOUNTER
Reason for call:   [x] Refill   [] Prior Auth  [] Other:     Office:   [x] PCP/Provider -   [] Specialty/Provider -     Medication: advair 250-50 mcg    Pharmacy: [facility]    Does the patient have enough for 3 days?   [x] Yes   [] No - Send as HP to POD

## 2024-02-26 DIAGNOSIS — E78.2 MIXED HYPERLIPIDEMIA: ICD-10-CM

## 2024-02-26 RX ORDER — EZETIMIBE 10 MG/1
TABLET ORAL
Qty: 90 TABLET | Refills: 1 | Status: SHIPPED | OUTPATIENT
Start: 2024-02-26

## 2024-02-26 RX ORDER — ROSUVASTATIN CALCIUM 10 MG/1
TABLET, COATED ORAL
Qty: 90 TABLET | Refills: 1 | Status: SHIPPED | OUTPATIENT
Start: 2024-02-26

## 2024-03-13 ENCOUNTER — RA CDI HCC (OUTPATIENT)
Dept: OTHER | Facility: HOSPITAL | Age: 79
End: 2024-03-13

## 2024-03-20 ENCOUNTER — OFFICE VISIT (OUTPATIENT)
Dept: FAMILY MEDICINE CLINIC | Facility: MEDICAL CENTER | Age: 79
End: 2024-03-20
Payer: MEDICARE

## 2024-03-20 VITALS
TEMPERATURE: 99.1 F | HEART RATE: 68 BPM | BODY MASS INDEX: 20.87 KG/M2 | SYSTOLIC BLOOD PRESSURE: 114 MMHG | WEIGHT: 121.6 LBS | DIASTOLIC BLOOD PRESSURE: 62 MMHG | OXYGEN SATURATION: 97 %

## 2024-03-20 DIAGNOSIS — J45.40 MODERATE PERSISTENT ASTHMA WITHOUT COMPLICATION: ICD-10-CM

## 2024-03-20 DIAGNOSIS — Z13.0 SCREENING FOR IRON DEFICIENCY ANEMIA: ICD-10-CM

## 2024-03-20 DIAGNOSIS — E78.2 MIXED HYPERLIPIDEMIA: Primary | ICD-10-CM

## 2024-03-20 DIAGNOSIS — C44.529 SQUAMOUS CELL CARCINOMA OF BACK: ICD-10-CM

## 2024-03-20 DIAGNOSIS — L57.0 ACTINIC KERATOSIS: ICD-10-CM

## 2024-03-20 DIAGNOSIS — J30.1 CHRONIC SEASONAL ALLERGIC RHINITIS DUE TO POLLEN: ICD-10-CM

## 2024-03-20 DIAGNOSIS — Z13.29 SCREENING FOR THYROID DISORDER: ICD-10-CM

## 2024-03-20 DIAGNOSIS — Z13.1 SCREENING FOR DIABETES MELLITUS: ICD-10-CM

## 2024-03-20 PROCEDURE — 99214 OFFICE O/P EST MOD 30 MIN: CPT | Performed by: FAMILY MEDICINE

## 2024-03-20 PROCEDURE — G2211 COMPLEX E/M VISIT ADD ON: HCPCS | Performed by: FAMILY MEDICINE

## 2024-03-20 RX ORDER — ALBUTEROL SULFATE 90 UG/1
2 AEROSOL, METERED RESPIRATORY (INHALATION) EVERY 6 HOURS PRN
Qty: 18 G | Refills: 1 | Status: SHIPPED | OUTPATIENT
Start: 2024-03-20

## 2024-03-20 RX ORDER — FLUTICASONE PROPIONATE 50 MCG
2 SPRAY, SUSPENSION (ML) NASAL DAILY
Qty: 48 ML | Refills: 2 | Status: SHIPPED | OUTPATIENT
Start: 2024-03-20

## 2024-03-20 NOTE — PROGRESS NOTES
Name: David Grijalva      : 1945      MRN: [id number]  Encounter Provider: Michele Hoffman MD  Encounter Date: 3/20/2024   Encounter department: [facility]    Assessment & Plan     1. Mixed hyperlipidemia  Assessment & Plan:  He is due to get a lipid panel.  He has been taking Zetia and rosuvastatin.  He is having no side effects from those medications.    Continue those medication, eat a low saturated fat diet.    Orders:  -     Lipid Panel with Direct LDL reflex; Future  -     Comprehensive metabolic panel; Future    2. Chronic seasonal allergic rhinitis due to pollen  Assessment & Plan:  He is using Flonase and it is working well for him.  He uses it year-long.    Continue Flonase    Orders:  -     fluticasone (FLONASE) 50 mcg/act nasal spray; 2 sprays into each nostril daily    3. Moderate persistent asthma without complication  Assessment & Plan:  He is using Advair daily.  It is working well and it is a rare occasion that he needs a rescue inhaler.    Will continue Advair and he needs another rescue inhaler because of what he has is very old.    Orders:  -     albuterol (ProAir HFA) 90 mcg/act inhaler; Inhale 2 puffs every 6 (six) hours as needed for wheezing    4. Squamous cell carcinoma of back  Assessment & Plan:  He follows up with dermatology.  The SCC was excised several years ago.      5. Actinic keratosis  Assessment & Plan:  Continue follow-up with dermatology.      6. Screening for iron deficiency anemia  -     CBC and differential; Future    7. Screening for thyroid disorder  -     TSH, 3rd generation with Free T4 reflex; Future    8. Screening for diabetes mellitus  -     Comprehensive metabolic panel; Future           Subjective      Review of Systems   Constitutional: Negative.    HENT: Negative.     Eyes: Negative.    Respiratory: Negative.     Cardiovascular: Negative.    Gastrointestinal: Negative.    Genitourinary: Negative.    Musculoskeletal: Negative.     Neurological: Negative.    Psychiatric/Behavioral: Negative.         Current Outpatient Medications on File Prior to Visit   Medication Sig   • cyanocobalamin (VITAMIN B-12) 1,000 mcg tablet Take by mouth daily   • ezetimibe (ZETIA) 10 mg tablet TAKE 1 TABLET BY MOUTH EVERY DAY   • Fluticasone-Salmeterol (Advair Diskus) 250-50 mcg/dose inhaler Inhale 1 puff 2 (two) times a day Rinse mouth after use.   • Multiple Vitamin (MULTIVITAMIN) capsule Take 1 capsule by mouth daily   • rosuvastatin (CRESTOR) 10 MG tablet TAKE 1 TABLET BY MOUTH EVERY DAY   • [DISCONTINUED] fluticasone (FLONASE) 50 mcg/act nasal spray USE 1-2 SPRAYS INTO EACH NOSTRIL DAILY       Objective     /62 (BP Location: Left arm, Patient Position: Sitting, Cuff Size: Large)   Pulse 68   Temp 99.1 °F (37.3 °C) (Temporal)   Wt 55.2 kg (121 lb 9.6 oz)   SpO2 97%   BMI 20.87 kg/m²     Physical Exam  Vitals and nursing note reviewed.   Constitutional:       General: He is not in acute distress.     Appearance: Normal appearance. He is well-developed. He is not ill-appearing.   HENT:      Head: Normocephalic and atraumatic.      Right Ear: Tympanic membrane, ear canal and external ear normal.      Left Ear: Tympanic membrane, ear canal and external ear normal.      Nose: Nose normal. No congestion or rhinorrhea.      Mouth/Throat:      Mouth: Mucous membranes are moist.   Eyes:      General: Lids are normal.      Extraocular Movements: Extraocular movements intact.      Conjunctiva/sclera: Conjunctivae normal.      Pupils: Pupils are equal, round, and reactive to light.   Neck:      Thyroid: No thyromegaly.      Vascular: No carotid bruit.   Cardiovascular:      Rate and Rhythm: Normal rate and regular rhythm.      Pulses: Normal pulses.      Heart sounds: Normal heart sounds, S1 normal and S2 normal. No murmur heard.  Pulmonary:      Effort: Pulmonary effort is normal. No respiratory distress.      Breath sounds: Normal breath sounds. No wheezing  or rales.   Abdominal:      General: Bowel sounds are normal.      Palpations: Abdomen is soft. There is no mass.      Tenderness: There is no abdominal tenderness.   Musculoskeletal:         General: Normal range of motion.      Cervical back: Normal range of motion and neck supple.   Lymphadenopathy:      Cervical: No cervical adenopathy.   Skin:     General: Skin is warm and dry.      Coloration: Skin is not pale.      Findings: No rash.   Neurological:      Mental Status: He is alert and oriented to person, place, and time.      Cranial Nerves: No cranial nerve deficit.      Sensory: No sensory deficit.      Deep Tendon Reflexes: Reflexes are normal and symmetric.   Psychiatric:         Behavior: Behavior normal. Behavior is cooperative.         Thought Content: Thought content normal.         Judgment: Judgment normal.       Michele Hoffman MD

## 2024-03-20 NOTE — ASSESSMENT & PLAN NOTE
He is due to get a lipid panel.  He has been taking Zetia and rosuvastatin.  He is having no side effects from those medications.    Continue those medication, eat a low saturated fat diet.

## 2024-03-20 NOTE — ASSESSMENT & PLAN NOTE
He is using Advair daily.  It is working well and it is a rare occasion that he needs a rescue inhaler.    Will continue Advair and he needs another rescue inhaler because of what he has is very old.

## 2024-03-21 ENCOUNTER — APPOINTMENT (OUTPATIENT)
Dept: LAB | Facility: MEDICAL CENTER | Age: 79
End: 2024-03-21
Payer: MEDICARE

## 2024-03-21 DIAGNOSIS — Z13.29 SCREENING FOR THYROID DISORDER: ICD-10-CM

## 2024-03-21 DIAGNOSIS — Z13.0 SCREENING FOR IRON DEFICIENCY ANEMIA: ICD-10-CM

## 2024-03-21 DIAGNOSIS — E78.2 MIXED HYPERLIPIDEMIA: ICD-10-CM

## 2024-03-21 DIAGNOSIS — Z13.1 SCREENING FOR DIABETES MELLITUS: ICD-10-CM

## 2024-03-21 LAB
ALBUMIN SERPL BCP-MCNC: 3.7 G/DL (ref 3.5–5)
ALP SERPL-CCNC: 48 U/L (ref 34–104)
ALT SERPL W P-5'-P-CCNC: 18 U/L (ref 7–52)
ANION GAP SERPL CALCULATED.3IONS-SCNC: 4 MMOL/L (ref 4–13)
AST SERPL W P-5'-P-CCNC: 17 U/L (ref 13–39)
BASOPHILS # BLD AUTO: 0.08 THOUSANDS/ÂΜL (ref 0–0.1)
BASOPHILS NFR BLD AUTO: 1 % (ref 0–1)
BILIRUB SERPL-MCNC: 0.59 MG/DL (ref 0.2–1)
BUN SERPL-MCNC: 15 MG/DL (ref 5–25)
CALCIUM SERPL-MCNC: 9.1 MG/DL (ref 8.4–10.2)
CHLORIDE SERPL-SCNC: 105 MMOL/L (ref 96–108)
CHOLEST SERPL-MCNC: 127 MG/DL
CO2 SERPL-SCNC: 28 MMOL/L (ref 21–32)
CREAT SERPL-MCNC: 0.83 MG/DL (ref 0.6–1.3)
EOSINOPHIL # BLD AUTO: 0.32 THOUSAND/ÂΜL (ref 0–0.61)
EOSINOPHIL NFR BLD AUTO: 6 % (ref 0–6)
ERYTHROCYTE [DISTWIDTH] IN BLOOD BY AUTOMATED COUNT: 13.2 % (ref 11.6–15.1)
GFR SERPL CREATININE-BSD FRML MDRD: 83 ML/MIN/1.73SQ M
GLUCOSE P FAST SERPL-MCNC: 81 MG/DL (ref 65–99)
HCT VFR BLD AUTO: 42.9 % (ref 36.5–49.3)
HDLC SERPL-MCNC: 47 MG/DL
HGB BLD-MCNC: 13.8 G/DL (ref 12–17)
IMM GRANULOCYTES # BLD AUTO: 0.01 THOUSAND/UL (ref 0–0.2)
IMM GRANULOCYTES NFR BLD AUTO: 0 % (ref 0–2)
LDLC SERPL CALC-MCNC: 68 MG/DL (ref 0–100)
LYMPHOCYTES # BLD AUTO: 1.71 THOUSANDS/ÂΜL (ref 0.6–4.47)
LYMPHOCYTES NFR BLD AUTO: 31 % (ref 14–44)
MCH RBC QN AUTO: 31.2 PG (ref 26.8–34.3)
MCHC RBC AUTO-ENTMCNC: 32.2 G/DL (ref 31.4–37.4)
MCV RBC AUTO: 97 FL (ref 82–98)
MONOCYTES # BLD AUTO: 0.64 THOUSAND/ÂΜL (ref 0.17–1.22)
MONOCYTES NFR BLD AUTO: 12 % (ref 4–12)
NEUTROPHILS # BLD AUTO: 2.82 THOUSANDS/ÂΜL (ref 1.85–7.62)
NEUTS SEG NFR BLD AUTO: 50 % (ref 43–75)
NRBC BLD AUTO-RTO: 0 /100 WBCS
PLATELET # BLD AUTO: 220 THOUSANDS/UL (ref 149–390)
PMV BLD AUTO: 9.8 FL (ref 8.9–12.7)
POTASSIUM SERPL-SCNC: 4.2 MMOL/L (ref 3.5–5.3)
PROT SERPL-MCNC: 6.5 G/DL (ref 6.4–8.4)
RBC # BLD AUTO: 4.42 MILLION/UL (ref 3.88–5.62)
SODIUM SERPL-SCNC: 137 MMOL/L (ref 135–147)
TRIGL SERPL-MCNC: 61 MG/DL
TSH SERPL DL<=0.05 MIU/L-ACNC: 1.89 UIU/ML (ref 0.45–4.5)
WBC # BLD AUTO: 5.58 THOUSAND/UL (ref 4.31–10.16)

## 2024-03-21 PROCEDURE — 80061 LIPID PANEL: CPT

## 2024-03-21 PROCEDURE — 80053 COMPREHEN METABOLIC PANEL: CPT

## 2024-03-21 PROCEDURE — 36415 COLL VENOUS BLD VENIPUNCTURE: CPT

## 2024-03-21 PROCEDURE — 85025 COMPLETE CBC W/AUTO DIFF WBC: CPT

## 2024-03-21 PROCEDURE — 84443 ASSAY THYROID STIM HORMONE: CPT

## 2024-03-26 ENCOUNTER — OFFICE VISIT (OUTPATIENT)
Age: 79
End: 2024-03-26
Payer: MEDICARE

## 2024-03-26 VITALS — TEMPERATURE: 100.1 F | HEIGHT: 64 IN | WEIGHT: 121 LBS | BODY MASS INDEX: 20.66 KG/M2

## 2024-03-26 DIAGNOSIS — Z85.828 HISTORY OF SKIN CANCER: ICD-10-CM

## 2024-03-26 DIAGNOSIS — Z13.89 SCREENING FOR SKIN CONDITION: Primary | ICD-10-CM

## 2024-03-26 DIAGNOSIS — L82.1 SEBORRHEIC KERATOSIS: ICD-10-CM

## 2024-03-26 DIAGNOSIS — D48.9 NEOPLASM OF UNCERTAIN BEHAVIOR: ICD-10-CM

## 2024-03-26 DIAGNOSIS — D18.01 CHERRY ANGIOMA: ICD-10-CM

## 2024-03-26 DIAGNOSIS — D22.9 MULTIPLE NEVI: ICD-10-CM

## 2024-03-26 PROCEDURE — 88342 IMHCHEM/IMCYTCHM 1ST ANTB: CPT | Performed by: STUDENT IN AN ORGANIZED HEALTH CARE EDUCATION/TRAINING PROGRAM

## 2024-03-26 PROCEDURE — 88305 TISSUE EXAM BY PATHOLOGIST: CPT | Performed by: STUDENT IN AN ORGANIZED HEALTH CARE EDUCATION/TRAINING PROGRAM

## 2024-03-26 PROCEDURE — 99214 OFFICE O/P EST MOD 30 MIN: CPT | Performed by: DERMATOLOGY

## 2024-03-26 PROCEDURE — 11102 TANGNTL BX SKIN SINGLE LES: CPT | Performed by: DERMATOLOGY

## 2024-03-26 PROCEDURE — 11103 TANGNTL BX SKIN EA SEP/ADDL: CPT | Performed by: DERMATOLOGY

## 2024-03-26 PROCEDURE — 88341 IMHCHEM/IMCYTCHM EA ADD ANTB: CPT | Performed by: STUDENT IN AN ORGANIZED HEALTH CARE EDUCATION/TRAINING PROGRAM

## 2024-03-26 NOTE — PROGRESS NOTES
"[facility] Note     Patient Name: David Grijalva  Encounter Date: March 26, 2024     Have you been cared for by a [facility] Dermatologist in the last 3 years and, if so, which description applies to you?    Yes.  I have been here within the last 3 years, and my medical history has NOT changed since that time.  I am MALE/not capable of bearing children.    REVIEW OF SYSTEMS:  Have you recently had or currently have any of the following? No changes in my recent health.   PAST MEDICAL HISTORY:  Have you personally ever had or currently have any of the following?  If \"YES,\" then please provide more detail. No changes in my medical history.   HISTORY OF IMMUNOSUPPRESSION: Do you have a history of any of the following:  Systemic Immunosuppression such as Diabetes, Biologic or Immunotherapy, Chemotherapy, Organ Transplantation, Bone Marrow Transplantation?  No     Answering \"YES\" requires the addition of the dotphrase \"IMMUNOSUPPRESSED\" as the first diagnosis of the patient's visit.   FAMILY HISTORY:  Any \"first degree relatives\" (parent, brother, sister, or child) with the following?    No changes in my family's known health.   PATIENT EXPERIENCE:    Do you want the Dermatologist to perform a COMPLETE skin exam today including a clinical examination under the \"bra and underwear\" areas?  Yes  If necessary, do we have your permission to call and leave a detailed message on your Preferred Phone number that includes your specific medical information?  Yes      No Known Allergies   Current Outpatient Medications:     cyanocobalamin (VITAMIN B-12) 1,000 mcg tablet, Take by mouth daily, Disp: , Rfl:     ezetimibe (ZETIA) 10 mg tablet, TAKE 1 TABLET BY MOUTH EVERY DAY, Disp: 90 tablet, Rfl: 1    fluticasone (FLONASE) 50 mcg/act nasal spray, 2 sprays into each nostril daily, Disp: 48 mL, Rfl: 2    Fluticasone-Salmeterol (Advair Diskus) 250-50 mcg/dose inhaler, Inhale 1 puff 2 (two) times a day Rinse mouth " after use., Disp: 60 blister, Rfl: 5    Multiple Vitamin (MULTIVITAMIN) capsule, Take 1 capsule by mouth daily, Disp: , Rfl:     rosuvastatin (CRESTOR) 10 MG tablet, TAKE 1 TABLET BY MOUTH EVERY DAY, Disp: 90 tablet, Rfl: 1    albuterol (ProAir HFA) 90 mcg/act inhaler, Inhale 2 puffs every 6 (six) hours as needed for wheezing (Patient not taking: Reported on 3/26/2024), Disp: 18 g, Rfl: 1          Whom besides the patient is providing clinical information about today's encounter?   NO ADDITIONAL HISTORIAN (patient alone provided history)    Physical Exam and Assessment/Plan by Diagnosis:    Chief complaint: Patient is a 79 y.o male present for a routine skin exam with history of non-melanoma skin cancer and has not had a skin check in several years so wife is concerned about several spots.    HISTORY OF BASAL CELL CARCINOMA    Physical Exam:  Anatomic Location Affected:  Right Calf - 10/2022  Left Shin and Left Back - 2020  Left Forearm Inferior, Right Shin - 2019  Left Hand and Lower Back- 2017  Morphological Description of scar:  well healed  Suspected Recurrence: No  Pertinent Positives:  Pertinent Negatives:    Additional History of Present Condition:  History of basal cell carcinoma with no sign of recurrence    Assessment and Plan:  Based on a thorough discussion of this condition and the management approach to it (including a comprehensive discussion of the known risks, side effects and potential benefits of treatment), the patient (family) agrees to implement the following specific plan:  Monitor for change    HISTORY OF SQUAMOUS CELL CARCINOMA     Physical Exam:  Anatomic Location Affected:  Right Elbow and Right Lower Back - 04/2022  Left Upper Arm, Right Forehead, Right Shin - 2020  Right Dorsum Hand, Right Upper Arm, Left Forearm, Left Forearm Medial - 2019  Mid Back - 2018  Left Elbow and Right Forearm- 2017  Right Forehead, Left Forearm - 2017  Morphological Description of Scar:  well healed  Suspected  "Recurrence: no  Regional adenopathy: no    Additional History of Present Condition:  History of Squamous Cell Carcinoma, treated.    Assessment and Plan:  Based on a thorough discussion of this condition and the management approach to it (including a comprehensive discussion of the known risks, side effects and potential benefits of treatment), the patient (family) agrees to implement the following specific plan:  Monitor for change    MELANOCYTIC NEVI (\"Moles\")    Physical Exam:  Anatomic Location Affected: Mostly on sun-exposed areas of the trunk and extremities  Morphological Description:  Scattered, 1-4mm round to ovoid, symmetrical-appearing, even bordered, skin colored to dark brown macules/papules, mostly in sun-exposed areas  Pertinent Positives:  Pertinent Negatives:    Additional History of Present Condition:  present on exam    Assessment and Plan:  Based on a thorough discussion of this condition and the management approach to it (including a comprehensive discussion of the known risks, side effects and potential benefits of treatment), the patient (family) agrees to implement the following specific plan:  Provided handout with information regarding the ABCDE's of moles   Recommend routine skin exams every 6 months   Sun avoidance, protective clothing (known as UPF clothing), and the use of at least SPF 30 sunscreens is advised. Sunscreen should be reapplied every two hours when outside.     SEBORRHEIC KERATOSIS; NON-INFLAMED    Physical Exam:  Anatomic Location Affected:  scattered across trunk, extremities, face  Morphological Description:  Flat and raised, waxy, smooth to warty textured, yellow to brownish-grey to dark brown to blackish, discrete, \"stuck-on\" appearing papules.  Pertinent Positives:  Pertinent Negatives:    Additional History of Present Condition:  Patient reports new bumps on the skin.  Denies itch, burn, pain, bleeding or ulceration.  Present constantly; nothing seems to make it " "worse or better.  No prior treatment.      Assessment and Plan:  Based on a thorough discussion of this condition and the management approach to it (including a comprehensive discussion of the known risks, side effects and potential benefits of treatment), the patient (family) agrees to implement the following specific plan:  Reassured benign    ANGIOMA (\"CHERRY ANGIOMA\")    Physical Exam:  Anatomic Location: scattered across sun exposed areas of the trunk and extremities   Morphologic Description: Firm red to reddish-blue discrete papules  Pertinent Positives:  Pertinent Negatives:    Additional History of Present Condition:  Present on exam.     Assessment and Plan:  Reassured benign    NEOPLASM OF UNCERTAIN BEHAVIOR OF SKIN    Physical Exam:  (Anatomic Location); (Size and Morphological Description); (Differential Diagnosis):  Specimen A; Right Upper Arm; 7mm indurated keratotic papule; DDX: Squamous Cell Carcinoma   Specimen B;Right Forearm; 12mm keratotic Papule; DDX.: Squamous Cell Carcinoma            Pertinent Positives:  Pertinent Negatives:    Additional History of Present Condition:  present on exam    Assessment and Plan:  I have discussed with the patient that a sample of skin via a \"skin biopsy” would be potentially helpful to further make a specific diagnosis under the microscope.  Based on a thorough discussion of this condition and the management approach to it (including a comprehensive discussion of the known risks, side effects and potential benefits of treatment), the patient (family) agrees to implement the following specific plan:    Procedure:  Skin Biopsy.  After a thorough discussion of treatment options and risk/benefits/alternatives (including but not limited to local pain, scarring, dyspigmentation, blistering, possible superinfection, and inability to confirm a diagnosis via histopathology), verbal and written consent were obtained and portion of the rash was biopsied for tissue sample. "  See below for consent that was obtained from patient and subsequent Procedure Note.     PROCEDURE TANGENTIAL (SHAVE) BIOPSY NOTE:    Performing Physician:   Anatomic Location; Clinical Description with size (cm); Pre-Op Diagnosis:   Specimen A; Right Upper Arm; 7mm indurated keratotic papule; DDX: Squamous Cell Carcinoma   Specimen B;Right Forearm; 12mm keratotic Papule; DDX.: Squamous Cell Carcinoma  Post-op diagnosis: Same     Local anesthesia: 3:1 1% xylocaine with epi and 1-100,000 buffered     Topical anesthesia: None    Hemostasis: Aluminum chloride     After obtaining informed consent  at which time there was a discussion about the purpose of biopsy  and low risks of infection and bleeding.  The area was prepped and draped in the usual fashion. Anesthesia was obtained with 1% lidocaine with epinephrine. A shave biopsy to an appropriate sampling depth was obtained by Shave (Dermablade or 15 blade) The resulting wound was covered with surgical ointment and bandaged appropriately.     The patient tolerated the procedure well without complications and was without signs of functional compromise.      Specimen has been sent for review by Dermatopathology.    Standard post-procedure care has been explained and has been included in written form within the patient's copy of Informed Consent.    Scribe Attestation      I,:  Archana Grissom am acting as a scribe while in the presence of the attending physician.:       I,:  Luis Juarez MD personally performed the services described in this documentation    as scribed in my presence.:

## 2024-03-26 NOTE — PATIENT INSTRUCTIONS
Dr. Juarez Shave/Punch Biopsy After Care Instructions      Remove bandage the next day. Keep bandage dry.    Shower or Bathe as usual the next day.    Cleanse the area once daily with saline or hydrogen peroxide.    Apply Vaseline.  WE ADVISE YOU NOT TO USE NEOSPORIN OR ANY TOPICAL ANTIBIOTIC UNLESS INSTRUCTED BY THE DOCTOR.    Cover area with a dressing or Band-Aid if possible.      Continue treatment until completely healed. (Skin appears in pink).    Try to avoid scab formation.      Slight bleeding may occur after the Band-Aid/Dressing is removed or the first few days after the procedure was done.      Don't panic!!  Apply continuous, direct pressure on the dressing over the wound for 15-20 minutes. DO NOT remove dressing.    HINT:  Set a timer for 15-20 minutes to make sure you press on the wound long enough  SOAKING: the dressing before you remove it should decrease chances of bleeding.  If the wound is on the legs or arms, swelling may occur. Elevating the arm or leg above the level of the heart as much as possible will also decrease swelling, promote healing and decrease chances of bleeding.        ANY QUESTION PLEASE CALL OUR OFFICE AT [phone]).  IF AFTER HOURS, THE ANSWERING SERVICE WILL GET A HOLD OF THE DOCTOR.    PLEASE BE ADVISED THAT BIOPSY RESULTS CAN TAKE UP TO 1 TO 2 WEEKS. YOU WILL RECEIVE THE RESULTS IN [place] FIRST, BUT PLEASE WAIT FOR THE DOCTOR OR STAFF TO NOTIFY YOU.      THANK YOU!!    ACTINIC KERATOSIS    Actinic keratoses are very common on sites repeatedly exposed to the sun, especially the backs of the hands and the face, most often affecting the ears, nose, cheeks, upper lip, vermilion of the lower lip, temples, forehead and balding scalp. In severely chronically sun-damaged individuals, they may also be found on the upper trunk, upper and lower limbs, and dorsum of feet.    We discussed the theoretical premalignant (“pre-cancerous”) nature and etiology of these growths.   We discussed the prevailing notion that actinic keratoses are a reflection of abnormal skin cell development due to DNA damage by short wavelength UVB.  They are more likely to appear if the immune function is poor, due to aging, recent sun exposure, predisposing disease or certain drugs.    We discussed that the main concern is that actinic keratoses may predispose to squamous cell carcinoma. It is rare for a solitary actinic keratosis to evolve to squamous cell carcinoma (SCC), but the risk of SCC occurring at some stage in a patient with more than 10 actinic keratoses is thought to be about 10 to 15%. A tender, thickened, ulcerated or enlarging actinic keratosis is suspicious of SCC.    Actinic keratoses may be prevented by strict sun protection. If already present, keratoses may improve with a very high sun protection factor (50+) broad-spectrum sunscreen applied at least daily to affected areas, year-round.  We recommend that UPF-rated clothing and hats and sunglasses be worn whenever possible and that a sunscreen-moisturizer combination product such as Neutrogena Daily Defense be applied at least three times a day.    We performed a thorough discussion of treatment options and specific risk/benefits/alternatives including but not limited to medical “field” treatment with medications such as the following:    Topical “field area” medications such as 5-fluorouracil or Aldara (specifically, the trouble with long-term compliance, blistering and local skin reaction versus the convenience of at-home therapy and that field therapy “gets what is not yet seen”).    Cryotherapy (specifically, local pain, scarring, dyspigmentation, blistering, possible superinfection, and treats “only what we see” versus directed treatment today).    Photodynamic therapy (specifically, local pain, scarring, dyspigmentation, blistering, possible superinfection, need to schedule for a later date, and time spent in the office versus field  therapy that “gets what is not yet seen”).      BASAL CELL CARCINOMA    What is basal cell carcinoma?  Basal cell carcinoma (BCC) is a common, locally invasive, keratinocytic, or non-melanoma, skin cancer. It is also known as rodent ulcer and basalioma. Patients with BCC often develop multiple primary tumours over time.    Who gets basal cell carcinoma?  Risk factors for BCC include:  Age and sex: BCCs are particularly prevalent in elderly males. However, they also affect females and younger adults   Previous BCC or other form of skin cancer (squamous cell carcinoma, melanoma)   Sun damage (photoaging, actinic keratoses)   Repeated prior episodes of sunburn   Fair skin, blue eyes and blond or red hair--note; BCC can also affect darker skin types   Previous cutaneous injury, thermal burn, disease (eg cutaneous lupus, sebaceous naevus)   Inherited syndromes: BCC is a particular problem for families with basal cell naevus syndrome (Gorlin syndrome), Thpfq-Rilgé-Bccbsqgo syndrome, Rombo syndrome, Oley syndrome and xeroderma pigmentosum   Other risk factors include ionising radiation, exposure to arsenic, and immune suppression due to disease or medicines    What causes basal cell carcinoma?  The cause of BCC is multifactorial.  Most often, there are DNA mutations in the patched (PTCH) tumour suppressor gene, part of hedgehog signaling pathway   These may be triggered by exposure to ultraviolet radiation   Various spontaneous and inherited gene defects predispose to BCC    What are the clinical features of basal cell carcinoma?  BCC is a locally invasive skin tumour. The main characteristics are:  Slowly growing plaque or nodule   Skin coloured, pink or pigmented   Varies in size from a few millimetres to several centimetres in diameter   Spontaneous bleeding or ulceration  BCC is very rarely a threat to life. A tiny proportion of BCCs grow rapidly, invade deeply, and/or metastasise to local lymph nodes.    Types of  basal cell carcinoma  There are several distinct clinical types of BCC, and over 20 histological growth patterns of BCC.  Nodular BCC  Most common type of facial BCC   Shiny or pearly nodule with a smooth surface   May have central depression or ulceration, so its edges appear rolled   Blood vessels cross its surface   Cystic variant is soft, with jelly-like contents   Micronodular, microcystic and infiltrative types are potentially aggressive subtypes   Also known as nodulocystic carcinoma  Superficial BCC  Most common type in younger adults   Most common type on upper trunk and shoulders   Slightly scaly, irregular plaque   Thin, translucent rolled border   Multiple microerosions  Morphoeaform BCC  Usually found in mid-facial sites   Waxy, scar-like plaque with indistinct borders   Wide and deep subclinical extension   May infiltrate cutaneous nerves (perineural spread)   Also known as morpheic, morphoeiform or sclerosing BCC  Basosquamous carcinoma  Mixed basal cell carcinoma (BCC) and squamous cell carcinoma (SCC)   Infiltrative growth pattern   Potentially more aggressive than other forms of BCC   Also known as basisquamous carcinoma and mixed basal-squamous cell carcinoma       Complications of basal cell carcinoma    Recurrent BCC  Recurrence of BCC after initial treatment is not uncommon. Characteristics of recurrent BCC often include:  Incomplete excision or narrow margins at primary excision   Morphoeic, micronodular, and infiltrative subtypes   Location on head and neck    Advanced BCC  Advanced BCCs are large, often neglected tumours.  They may be several centimetres in diameter   They may be deeply infiltrating into tissues below the skin   They are difficult or impossible to treat surgically    Metastatic BCC  Very rare   Primary tumour is often large, neglected or recurrent, located on head and neck, with aggressive subtype   May have had multiple prior treatments   May arise in site exposed to  ionising radiation   Can be fatal    How is basal cell carcinoma diagnosed?  BCC is diagnosed clinically by the presence of a slowly enlarging skin lesion with typical appearance. The diagnosis and  by a diagnostic biopsy or following excision.  Some typical superficial BCCs on trunk and limbs are clinically diagnosed and have non-surgical treatment without histology.    What is the treatment for primary basal cell carcinoma?  The treatment for a BCC depends on its type, size and location, the number to be treated, patient factors, and the preference or expertise of the doctor. Most BCCs are treated surgically. Long-term follow-up is recommended to check for new lesions and recurrence; the latter may be unnecessary if histology has reported wide clear margins.    Excision biopsy  Excision means the lesion is cut out and the skin stitched up.  Most appropriate treatment for nodular, infiltrative and morphoeic BCCs   Should include 3 to 5 mm margin of normal skin around the tumour   Very large lesions may require flap or skin graft to repair the defect   Pathologist will report deep and lateral margins   Further surgery is recommended for lesions that are incompletely excised    Mohs micrographically controlled excision  Mohs micrographically controlled surgery involves examining carefully marked excised tissue under the microscope, layer by layer, to ensure complete excision.  Very high cure rates achieved by trained Mohs surgeons   Used in high-risk areas of the face around eyes, lips and nose   Suitable for ill-defined, morphoeic, infiltrative and recurrent subtypes   Large defects are repaired by flap or skin graft    Superficial skin surgery  Superficial skin surgery comprises shave, curettage, and electrocautery. It is a rapid technique using local anaesthesia and does not require sutures.  Suitable for small, well-defined nodular or superficial BCCs   Lesions are usually located on trunk or limbs   Wound is  left open to heal by secondary intention   Moist wound dressings lead to healing within a few weeks   Eventual scar quality variable    Cryotherapy  Cryotherapy is the treatment of a superficial skin lesion by freezing it, usually with liquid nitrogen.  Suitable for small superficial BCCs on covered areas of trunk and limbs   Best avoided for BCCs on head and neck, and distal to knees   Double freeze-thaw technique   Results in a blister that crusts over and heals within several weeks.   Leaves permanent white deysi    Photodynamic therapy  Photodynamic therapy (PDT) refers to a technique in which BCC is treated with a photosensitising chemical, and exposed to light several hours later.  Topical photosensitisers include aminolevulinic acid lotion and methyl aminolevulinate cream   Suitable for low-risk small, superficial BCCs   Best avoided if tumour in site at high risk of recurrence   Results in inflammatory reaction, maximal 3-4 days after procedure   Treatment repeated 7 days after initial treatment   Excellent cosmetic results    Imiquimod cream  Imiquimod is an immune response modifier.  Best used for superficial BCCs less than 2 cm diameter   Applied three to five times each week, for 6-16 weeks   Results in a variable inflammatory reaction, maximal at three weeks   Minimal scarring is usual    Fluorouracil cream  5-Fluorouracil cream is a topical cytotoxic agent.  Used to treat small superficial basal cell carcinomas   Requires prolonged course, eg twice daily for 6-12 weeks   Causes inflammatory reaction   Has high recurrence rates    Radiotherapy  Radiotherapy or X-ray treatment can be used to treat primary BCCs or as adjunctive treatment if margins are incomplete.  Mainly used if surgery is not suitable   Best avoided in young patients and in genetic conditions predisposing to skin cancer   Best cosmetic results achieved using multiple fractions   Typically, patient attends once-weekly for several weeks    Causes inflammatory reaction followed by scar   Risk of radiodermatitis, late recurrence, and new tumours    What is the treatment for advanced or metastatic basal cell carcinoma?  Locally advanced primary, recurrent or metastatic BCC requires multidisciplinary consultation. Often a combination of treatments is used.  Surgery   Radiotherapy   Targeted therapy  Targeted therapy refers to the hedgehog signalling pathway inhibitors, vismodegib and sonidegib. These drugs have some important risks and side effects.    How can basal cell carcinoma be prevented?  The most important way to prevent BCC is to avoid sunburn. This is especially important in childhood and early life. Fair skinned individuals and those with a personal or family history of BCC should protect their skin from sun exposure daily, year-round and lifelong.  Stay indoors or under the shade in the middle of the day   Wear covering clothing   Apply high protection factor SPF50+ broad-spectrum sunscreens generously to exposed skin if outdoors   Avoid indoor tanning (sun beds, solaria)  Oral nicotinamide (vitamin B3) in a dose of 500 mg twice daily may reduce the number and severity of BCCs.    What is the outlook for basal cell carcinoma?  Most BCCs are cured by treatment. Cure is most likely if treatment is undertaken when the lesion is small.  About 50% of people with BCC develop a second one within 3 years of the first. They are also at increased risk of other skin cancers, especially melanoma. Regular self-skin examinations and long-term annual skin checks by an experienced health professional are recommended.        SQUAMOUS CELL CARCINOMA    What is cutaneous squamous cell carcinoma?  Cutaneous squamous cell carcinoma (SCC) is a common type of keratinocyte or non-melanoma skin cancer. It is derived from cells within the epidermis that make keratin -- the horny protein that makes up skin, hair and nails.  Cutaneous SCC is an invasive disease,  referring to cancer cells that have grown beyond the epidermis. SCC can sometimes metastasise and may prove fatal.  Intraepidermal carcinoma (cutaneous SCC in situ) and mucosal SCC are considered elsewhere.    Who gets cutaneous squamous cell carcinoma?  Risk factors for cutaneous SCC include:  Age and sex: SCCs are particularly prevalent in elderly males. However, they also affect females and younger adults.   Previous SCC or another form of skin cancer (basal cell carcinoma, melanoma) are a strong predictor for further skin cancers.   Actinic keratoses   Outdoor occupation or recreation   Smoking   Fair skin, blue eyes and blond or red hair   Previous cutaneous injury, thermal burn, disease (eg cutaneous lupus, epidermolysis bullosa, leg ulcer)   Inherited syndromes: SCC is a particular problem for families with xeroderma pigmentosum and albinism   Other risk factors include ionising radiation, exposure to arsenic, and immune suppression due to disease (eg chronic lymphocytic leukaemia) or medicines. Organ transplant recipients have a massively increased risk of developing SCC.    What causes cutaneous squamous cell carcinoma?  More than 90% of cases of SCC are associated with numerous DNA mutations in multiple somatic genes. Mutations in the p53 tumour suppressor gene are caused by exposure to ultraviolet radiation (UV), especially UVB (known as signature 7). Other signature mutations relate to cigarette smoking, ageing and immune suppression (eg, to drugs such as azathioprine). Mutations in signalling pathways affect the epidermal growth factor receptor, JENNIFER, Fyn, and [id number] signalling.   Beta-genus human papillomaviruses (wart virus) are thought to play a role in SCC arising in immune-suppressed populations. ?-HPV and HPV subtypes 5, 8, 17, 20, 24, and 38 have also been associated with an increased risk of cutaneous SCC in immunocompetent individuals.     What are the clinical features of cutaneous squamous  cell carcinoma?  Cutaneous SCCs present as enlarging scaly or crusted lumps. They usually arise within pre-existing actinic keratosis or intraepidermal carcinoma.  They grow over weeks to months   They may ulcerate   They are often tender or painful   Located on sun-exposed sites, particularly the face, lips, ears, hands, forearms and lower legs   Size varies from a few millimetres to several centimetres in diameter.    Types of cutaneous squamous cell carcinoma  Distinct clinical types of invasive cutaneous SCC include:  Cutaneous horn -- the horn is due to excessive production of keratin   Keratoacanthoma (KA) -- a rapidly growing keratinising nodule that may resolve without treatment   Carcinoma cuniculatum (‘verrucous carcinoma’), a slow-growing, warty tumour on the sole of the foot.   Multiple eruptive SCC/KA-like lesions arising in syndromes, such as multiple self-healing squamous epitheliomas of Taylor-Smith and Grzybowski syndrome  The pathologist may classify a tumour as well differentiated, moderately well differentiated, poorly differentiated or anaplastic cutaneous SCC. There are other variants.    Classification of squamous cell carcinoma by risk  Cutaneous SCC is classified as low-risk or high-risk, depending on the chance of tumour recurrence and metastasis. Characteristics of high-risk SCC include:  High-risk cutaneous squamous cell carcinoma has the following characteristics:  Diameter greater than or equal to 2 cm   Location on the ear, vermilion of the lip, central face, hands, feet, genitalia   Arising in elderly or immune suppressed patient   Histological thickness greater than 2 mm, poorly differentiated histology, or with the invasion of the subcutaneous tissue, nerves and blood vessels  Metastatic SCC is found in regional lymph nodes (80%), lungs, liver, brain, bones and skin.    Staging cutaneous squamous cell carcinoma  In 2011, the American Joint Committee on Cancer (AJCC) published a  new staging systemic for cutaneous SCC for the 7th Edition of the AJCC manual. This evaluates the dimensions of the original primary tumour (T) and its metastases to lymph nodes (N).    Tumour staging for cutaneous SCC  TX: Th Primary tumour cannot be assessed  T0: No evidence of a primary tumour  Tis: Carcinoma in situ  T1: Tumour ? 2cm without high-risk features  T2: Tumour ? 2cm; or; Tumour ? 2 cm with high-risk features  T3: Tumour with the invasion of maxilla, mandible, orbit or temporal bone  T4: Tumour with the invasion of axial or appendicular skeleton or perineural invasion of skull base    Titi staging for cutaneous SCC  NX: Regional lymph nodes cannot be assessed  N0: No regional lymph node metastasis  N1: Metastasis in one local lymph node ? 3cm  N2: Metastasis in one local lymph node ? 3cm; or; Metastasis in >1 local lymph node ? 6cm  N3: Metastasis in lymph node ? 6cm    How is squamous cell carcinoma diagnosed?  Diagnosis of cutaneous SCC is based on clinical features. The diagnosis and histological subtype are confirmed pathologically by diagnostic biopsy or following excision. See squamous cell carcinoma - pathology.  Patients with high-risk SCC may also undergo staging investigations to determine whether it has spread to lymph nodes or elsewhere. These may include:  Imaging using ultrasound scan, X-rays, CT scans, MRI scans   Lymph node or other tissue biopsies    What is the treatment for cutaneous squamous cell carcinoma?  Cutaneous SCC is nearly always treated surgically. Most cases are excised with a 3-10 mm margin of normal tissue around a visible tumour. A flap or skin graft may be needed to repair the defect.  Other methods of removal include:  Shave, curettage, and electrocautery for low-risk tumours on trunk and limbs   Aggressive cryotherapy for very small, thin, low-risk tumours   Mohs micrographic surgery for large facial lesions with indistinct margins or recurrent tumours    Radiotherapy for an inoperable tumour, patients unsuitable for surgery, or as adjuvant    What is the treatment for advanced or metastatic squamous cell carcinoma?  Locally advanced primary, recurrent or metastatic SCC requires multidisciplinary consultation. Often a combination of treatments is used.  Surgery   Radiotherapy   Cemiplimab   Experimental targeted therapy using epidermal growth factor receptor inhibitors    How can cutaneous squamous cell carcinoma be prevented?  There is a great deal of evidence to show that very careful sun protection at any time of life reduces the number of SCCs. This is particularly important in ageing, sun-damaged, fair skin; in patients that are immune suppressed; and in those who already have actinic keratoses or previous SCC.  Stay indoors or under the shade in the middle of the day   Wear covering clothing   Apply high protection factor SPF50+ broad-spectrum sunscreens generously to exposed skin if outdoors   Avoid indoor tanning (sun beds, solaria)    Oral nicotinamide (vitamin B3) in a dose of 500 mg twice daily may reduce the number and severity of SCCs in people at high risk.  Patients with multiple squamous cell carcinomas may be prescribed an oral retinoid (acitretin or isotretinoin). These reduce the number of tumours but have some nuisance side effects.    What is the outlook for cutaneous squamous cell carcinoma?  Most SCCs are cured by treatment. A cure is most likely if treatment is undertaken when the lesion is small. The risk of recurrence or disease-associated death is greater for tumours that are > 20 mm in diameter and/or > 2 mm in thickness at the time of surgical excision.  About 50% of people at high risk of SCC develop a second one within 5 years of the first. They are also at increased risk of other skin cancers, especially melanoma. Regular self-skin examinations and long-term annual skin checks by an experienced health professional are  recommended.

## 2024-04-01 PROCEDURE — 88341 IMHCHEM/IMCYTCHM EA ADD ANTB: CPT | Performed by: STUDENT IN AN ORGANIZED HEALTH CARE EDUCATION/TRAINING PROGRAM

## 2024-04-01 PROCEDURE — 88305 TISSUE EXAM BY PATHOLOGIST: CPT | Performed by: STUDENT IN AN ORGANIZED HEALTH CARE EDUCATION/TRAINING PROGRAM

## 2024-04-01 PROCEDURE — 88342 IMHCHEM/IMCYTCHM 1ST ANTB: CPT | Performed by: STUDENT IN AN ORGANIZED HEALTH CARE EDUCATION/TRAINING PROGRAM

## 2024-04-04 ENCOUNTER — TELEPHONE (OUTPATIENT)
Age: 79
End: 2024-04-04

## 2024-04-15 ENCOUNTER — TELEPHONE (OUTPATIENT)
Age: 79
End: 2024-04-15

## 2024-04-15 NOTE — TELEPHONE ENCOUNTER
Patients wife called to state the patient has a new lesion on the left forearm that popped up on Saturday its as big as a nickel they were wondering if Dr. Miles could take a look at it when he comes in on 5/6/24 for a procedure

## 2024-05-02 NOTE — ASSESSMENT & PLAN NOTE
He is due for a lipid panel  He is taking Crestor and Zetia  Continue those medications, I will not recommend a low-fat diet because he is underweight  However he should probably avoid sausage, martines, processed meats  Plan:  1. Medication changes: None    2. Testing: None    3. Labs: fasting labs 1 week prior to follow up appt with Dr. Starr    4. Follow up: 6 months    Continue to follow these guidelines unless otherwise instructed by your doctor:    2000mg sodium diet with NO added salt to food  Activity as instructed by your doctor:  Drink 64 ounces of fluid per day, total  Weigh yourself daily at the same time each day and keep a record of it  Report weight gain of 3 lbs in 1 day or 5 lbs or more in 1 week    CONTINUE TO TAKE YOUR MEDICATIONS AS PRESCRIBED   BRING YOUR MEDICATION LIST TO EACH VISIT    Please call the Heart Failure Office at [phone] if you are unable to keep your appointment or if you have any questions or concerns.

## 2024-05-06 ENCOUNTER — PROCEDURE VISIT (OUTPATIENT)
Age: 79
End: 2024-05-06

## 2024-05-06 VITALS
BODY MASS INDEX: 20.66 KG/M2 | HEIGHT: 64 IN | TEMPERATURE: 99 F | DIASTOLIC BLOOD PRESSURE: 60 MMHG | WEIGHT: 121 LBS | SYSTOLIC BLOOD PRESSURE: 108 MMHG

## 2024-05-06 DIAGNOSIS — C44.612 BASAL CELL CARCINOMA OF RIGHT UPPER ARM: Primary | ICD-10-CM

## 2024-05-06 DIAGNOSIS — D04.61 SQUAMOUS CELL CARCINOMA IN SITU (SCCIS) OF SKIN OF RIGHT FOREARM: ICD-10-CM

## 2024-05-06 PROCEDURE — 88342 IMHCHEM/IMCYTCHM 1ST ANTB: CPT | Performed by: STUDENT IN AN ORGANIZED HEALTH CARE EDUCATION/TRAINING PROGRAM

## 2024-05-06 PROCEDURE — 88305 TISSUE EXAM BY PATHOLOGIST: CPT | Performed by: STUDENT IN AN ORGANIZED HEALTH CARE EDUCATION/TRAINING PROGRAM

## 2024-05-06 NOTE — PROGRESS NOTES
"[facility] Dermatology Clinic Note     Patient Name: David Grijalva  Encounter Date: 05/06/2024     Have you been cared for by a [facility] Dermatologist in the last 3 years and, if so, which description applies to you?    Yes.  I have been here within the last 3 years, and my medical history has NOT changed since that time.  I am MALE/not capable of bearing children.    REVIEW OF SYSTEMS:  Have you recently had or currently have any of the following? No changes in my recent health.   PAST MEDICAL HISTORY:  Have you personally ever had or currently have any of the following?  If \"YES,\" then please provide more detail. No changes in my medical history.   HISTORY OF IMMUNOSUPPRESSION: Do you have a history of any of the following:  Systemic Immunosuppression such as Diabetes, Biologic or Immunotherapy, Chemotherapy, Organ Transplantation, Bone Marrow Transplantation?  No     Answering \"YES\" requires the addition of the dotphrase \"IMMUNOSUPPRESSED\" as the first diagnosis of the patient's visit.   FAMILY HISTORY:  Any \"first degree relatives\" (parent, brother, sister, or child) with the following?    No changes in my family's known health.   PATIENT EXPERIENCE:    Do you want the Dermatologist to perform a COMPLETE skin exam today including a clinical examination under the \"bra and underwear\" areas?  NO  If necessary, do we have your permission to call and leave a detailed message on your Preferred Phone number that includes your specific medical information?  Yes      No Known Allergies   Current Outpatient Medications:     albuterol (ProAir HFA) 90 mcg/act inhaler, Inhale 2 puffs every 6 (six) hours as needed for wheezing (Patient not taking: Reported on 3/26/2024), Disp: 18 g, Rfl: 1    cyanocobalamin (VITAMIN B-12) 1,000 mcg tablet, Take by mouth daily, Disp: , Rfl:     ezetimibe (ZETIA) 10 mg tablet, TAKE 1 TABLET BY MOUTH EVERY DAY, Disp: 90 tablet, Rfl: 1    fluticasone (FLONASE) 50 mcg/act nasal spray, 2 " sprays into each nostril daily, Disp: 48 mL, Rfl: 2    Fluticasone-Salmeterol (Advair Diskus) 250-50 mcg/dose inhaler, Inhale 1 puff 2 (two) times a day Rinse mouth after use., Disp: 60 blister, Rfl: 5    Multiple Vitamin (MULTIVITAMIN) capsule, Take 1 capsule by mouth daily, Disp: , Rfl:     rosuvastatin (CRESTOR) 10 MG tablet, TAKE 1 TABLET BY MOUTH EVERY DAY, Disp: 90 tablet, Rfl: 1          Whom besides the patient is providing clinical information about today's encounter?   NO ADDITIONAL HISTORIAN (patient alone provided history)    Physical Exam and Assessment/Plan by Diagnosis:    PROCEDURE:  EXCISION WITH INTERMEDIATE LAYERED CLOSURE     Attending:   Assistant:  Felisa ADLER    Pre-Op Diagnosis: basal cell carcinoma   Post-Op Diagnosis: Same   Benign versus Malignant malignant      Lesion Anatomic Location: right upper arm (Previous Accession Number: [id number] )  Pre-op size: 7 mm  Size of defect:  15 mm (with 0.4 centimeter margins)  Final repaired wound length:  5.0 cm    Written and verbal, witnessed informed consent was obtained. I discussed that excision is a method of removing lesions both benign and malignant lesions.  A portion of normal skin is often taken to ensure completeness of removal.  I reviewed that procedure will include numbing the area,  cutting around and under defect, undermining tissue, and closing the wound with sutures both inside and out.  These sutures are usually removed in 7 to 14 days. Risks (bleeding, pain, infection, scarring, recurrence) and benefits discussed. It was discussed with patient that every effort is made to minimize scar, but scarring is influenced also by extrinsic factor such as location, age and genetics.     Time Out: performed:  yes  Correct patient: yes.   Correct site per Clinic Report: yes  Correct site per Patient Report: yes    LOCAL ANESTHESIA: 3:1 1% xylocaine with epi and 1-100,000 buffered    DESCRIPTION OF PROCEDURE: The patient was brought  back into the procedure room, anesthetized locally, prepped and draped in the usual fashion. Using a #15 blade with a scalpel, the lesion was excised in elliptical fashion. The wound was  undermined in the  fascial plane. Hemostasis was achieved with light electrocoagulation. Purpose of undermining was to decrease wound tension and facilitate closure.    The wound was closed with subcutaneous sutures as follows:    Deep suture:4-0 Vicryl 3 sutures      Epidermal edge closure was accomplished with superficial sutures as follows:    Superficial suture: 4-0 Ethilon  Superficial suture type: 9 interrupted    Estimated blood loss less than 3ml.    The patient tolerated the procedure well without any complications. The wound was cleaned with sterile saline, dried off, surgical vaseline ointment was applied, and the wound was covered. A pressure dressing was applied for stabilization and light pressure. The patient was given detailed oral and written instructions on postoperative care as detailed in consent. The patient left in good medical condition.      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 I discussed that excision is a method of removing lesions both benign and malignant lesions.  A portion of normal skin is often taken to ensure completeness of removal.  I reviewed that procedure will include numbing the area,  cutting around and under defect, undermining tissue, and closing the wound with sutures both inside and out.  These sutures are usually removed in 7 to 14 days. Risks  (bleeding, pain, infection, scarring, recurrence) and benefits discussed. It was discussed with patient that every effort is made to minimize scar, but scarring is influenced also by extrinsic factor such as location, age and genetics.     Time Out: performed:  yes  Correct patient: yes.   Correct site per Clinic Report: yes  Correct site per Patient Report: yes    LOCAL ANESTHESIA: 3:1 1% xylocaine with epi and 1-100,000 buffered    DESCRIPTION OF PROCEDURE: The patient was brought back into the procedure room, anesthetized locally, prepped and draped in the usual fashion. Using a #15 blade with a scalpel, the lesion was excised in elliptical fashion. The wound was  undermined in the  fascial plane. Hemostasis was achieved with light electrocoagulation. Purpose of undermining was to decrease wound tension and facilitate closure.    The wound was closed with subcutaneous sutures as follows:    Deep suture:4-0 Vicryl 2 sutures      Epidermal edge closure was accomplished with superficial sutures as follows:    Superficial suture: 4-0 Ethilon  Superficial suture type: 7 interrupted sutures    Estimated blood loss less than 3ml.    The patient tolerated the procedure well without any complications. The wound was cleaned with sterile saline, dried off, surgical vaseline ointment was applied, and the wound was covered. A pressure dressing was applied for stabilization and light pressure. The patient was given detailed oral and written instructions on postoperative care as detailed in consent. The patient left in good medical condition.    POSTOP DISCUSSION DISCUSSION AND INSTRUCTIONS FOR PATIENT      Rationale for Procedure  A skin excision allows the dermatologist to remove a lesion. The procedure involves a local numbing medication and removing the entire lesion. Typically, the lesion is being removed because it is atypical, traumatized, or for significant appearance reasons.  The area will be open like a brush burn  and allowed to heal.   There will be no sutures.Tissue is sent to pathologist who will reconfirm diagnosis and verify completeness of lesion removal.    Description of Procedure  We would like to perform a skin excision today.  A local anesthetic, similar to the kind that a dentist uses when filling a cavity, will be injected with a very small needle into the skin area to be sampled.  The injected skin and tissue underneath should “go to sleep” and become numbed so that no further pain should be felt.  A scalpel will be used to cut around the lesion and tissue will be submitted to pathology for examination.  Depending on the diagnosis the lesion will be excised with a certain amount of normal skin to help assure completeness of lesion removal.  The physician will discuss in advance the anticipated size and extent of removal.   Bleeding will occur, but it will stopped with direct pressure, sutures, and electrocautery.    Surgical “Vaseline-type” ointment will also applied after the procedure to help create a barrier between the wound and the outside world.      Risks and Potential Complications  The advantage of a skin excision is that it allows us to remove a problem lesion quickly.  Although this usually permits the lesion to heal as soon as possible with the least scarring, there are some risks and potential complications that include but are not limited to the following:  Some bleeding is normal at time of procedure and some bleeding on gauze is normal  the first few days after surgery.  Profuse bleeding and bleeding with swelling and pain should be reported as detailed  below  Infection is uncommon in skin surgery.  Infection should be reported and is indicated by pain, redness, and discharge of purulent material.  Some dull to at time sharp pain could occur initially the day after surgery.  Persistent pain or increasing pain days after surgery is not expected and should be reported.  Every effort is made to  minimize scar, but location, size, and genetics do play a role in scar appearance.  A surgical wound does not achieve its optimal appearance until 6 months.  There are several treatments available if scarring would be problematic including scar creams, silicone pad, laser and scar revision.  Skin discoloration can occur especially in people of color.  Its important to avoid sun on wound in first 6 months after surgery.  Treatment is available if pigment is problematic.  Incomplete removal of the lesion or recurrence of lesion can occur and this would then require further treatment and more surgery.  Nerve Damage/Numbness/Loss of Function is very rare, but is most likely to occur if lesion is large or if it is in a high risk location  Allergic Reaction to lidocaine is rare.  More commonly,  epinephrine is used  with the lidocaine.  Occasionally, epinephrine (aka adrenalin) may cause a brief  feeling of anxiety or jitteriness.  The person at the microscope  (pathologist) may provide additional information that was unexpected. This unexpected finding could provoke the need for additional treatment or evaluation.    What You Will Need to Do After the Procedure  Keep the area clean and dry the first day. Try NOT to remove the bandage for the first day.  Gently clean the area with soap and water and apply Vaseline ointment (this is over the counter and not a prescription) to the excision  site for up to 2 weeks.    Apply a clean appropriately sized bandage to area.  Gauze and paper tape are recommended for sensitive skin.  Return for suture removal as instructed (generally 1 week for the face, 2 weeks for the body).  Take Acetaminophen (Tylenol) for discomfort, if no contraindications.  Do NOT take Ibuprofen or aspirin unless specifically told to do so by your Dermatologist because these medications can make bleeding worse.  Call our office immediately for signs of infection: fever, chills, increased redness, warmth,  tenderness, discomfort/pain, or pus or foul smell coming from the wound.    If bleeding is noticed, place a clean cloth over the area and apply firm pressure for thirty minutes.  Check the wound ONLY after 30 minutes of direct pressure; do not cheat and sneak a peak, as that does not count.  If bleeding persists after 30 minutes of legitimate direct pressure, then try one more round of direct pressure for an additional 10 minutes to the area.  Should the bleeding become heavier or not stop after the second attempt, call [facility] directly at [phone] or, if after hours, go to your local Emergency Room/Emergency Department.    Scribe Attestation      I,:  Felisa Valente am acting as a scribe while in the presence of the attending physician.:       I,:  Luis Juarez MD personally performed the services described in this documentation    as scribed in my presence.:

## 2024-05-06 NOTE — PATIENT INSTRUCTIONS
"Dr. Juarez - Surgery with Sutures After Care Instructions    WOUND CARE AFTER SURGERY:    Do NOT to remove the pressure bandage for 48 hours. Keep the area clean and dry while this bandage is on.    After removing the bandage for the first time, gently clean the area with soap and water. If the bandage is difficult to remove, getting the bandage wet in the shower will sometimes help soften the adhesive and allow it to be removed more easily.     You will now need to cleanse this area daily in the shower with gentle soap. There is no need to scrub the area. Apply plain Vaseline ointment (this is over the counter and not a prescription) to the site followed by a clean appropriately sized bandage to area.  Non stick dressing and paper tape (or Hypafix) are recommended for sensitive skin but a bandaid is fine if it covers the area well.  DO NOT USE NEOSPORIN, BACITRACIN OR ANY TOPICAL ANTIBIOTIC UNLESS INSTRUCTED BY THE DOCTOR.      You will need to continue the above daily wound care until you return for suture removal in 10 days (generally 7 days for the face, 10-14 days off the face)      RESTRICTIONS:     For two DAYS:   - You will need to take it very easy as this time is highest risk for bleeding. Being a \"couch potato\" during these two days is generally recommended.   - For surgeries on the face/neck/scalp: Avoid leaning down to pick things up off the floor as this brings blood up to your head. Instead, squat down to pick things up.     For two WEEKS:   - No heavy lifting (anything greater than 10 pounds)   - You can start to do slow, gentle activities such as slow walking but nothing to increase your heart rate and blood pressure too much (such as cardiovascular exercise). It is important to take it easy as there is still a risk for bleeding and a high risk popping of stitches open during this time.     If we did surgery near the eyes (including the nose, forehead, front part of your scalp, cheeks): It is VERY " common to get a large amount of swelling around your eyes (puffy eyes). Although less frequent, this can be enough to swell your eyes shut and can also come along with bruising. This should not hurt and is very expected and normal. It is typically worst at ~ 3 days out from your surgery and dramatically better 1 week post-operatively.     If we did surgery around your nose: No blowing your nose as this puts you at higher risk of popping stitches durign this time. Instead dab under your nose with a tissue or use a Q-tip inside your nose.    If we did surgery on the skin above or bellow your lip or your lip itself: No sipping from straws as this uses a lot of the muscles around your mouth and increases the risk of popping stitches during this time.    MANAGING YOUR PAIN AFTER SURGERY     You can expect to have some pain after surgery. This is normal. The pain is typically worse the first two days after surgery, and quickly begins to get better.     The best strategy for controlling your pain after surgery is around the clock pain control. You can take over the counter Acetaminophen (Tylenol) for discomfort, if no contraindications.     If you are taking this at the maximum dose, you can alternate this with Motrin (ibuprofen or Advil) as well. Alternating these medications with each other allows you to maximize your pain control. In addition to Tylenol and Motrin, you can use heating pads or ice packs on your incisions to help reduce your pain.     How will I alternate your regular strength over-the-counter pain medication?  You will take a dose of pain medication every three hours.   Start by taking 650 mg of Tylenol (2 pills of 325 mg)   3 hours later take 600 mg of Motrin (3 pills of 200 mg)   3 hours after taking the Motrin take 650 mg of Tylenol   3 hours after that take 600 mg of Motrin.    See example - if your first dose of Tylenol is at 12:00 PM     12:00 PM  Tylenol 650 mg (2 pills of 325 mg)    3:00 PM   Motrin 600 mg (3 pills of 200 mg)    6:00 PM  Tylenol 650 mg (2 pills of 325 mg)    9:00 PM  Motrin 600 mg (3 pills of 200 mg)    Continue alternating every 3 hours      Important:   Do not take more than 4000mg of Tylenol or 3200mg of Motrin in a 24-hour period.       CALL OUR OFFICE IMMEDIATELY FOR ANY SIGNS OF INFECTION:    This includes fever, chills, increased redness, warmth, tenderness, severe discomfort/pain, or pus or foul smell coming from the wound.  Call [facility] directly at   [phone]    IF BLEEDING IS NOTICED:    Place a clean cloth over the area and apply firm pressure for thirty minutes.  Check the wound ONLY after 30 minutes of direct pressure; do not cheat and sneak a peak, as that does not count.  If bleeding persists after 30 minutes of legitimate direct pressure, then try one more round of direct pressure to the area.  Should the bleeding become heavier or not stop after the second attempt, call [facility] directly at [phone]).

## 2024-05-16 ENCOUNTER — OFFICE VISIT (OUTPATIENT)
Age: 79
End: 2024-05-16

## 2024-05-16 VITALS — BODY MASS INDEX: 20.66 KG/M2 | WEIGHT: 121 LBS | HEIGHT: 64 IN

## 2024-05-16 DIAGNOSIS — C44.612 BASAL CELL CARCINOMA OF RIGHT UPPER ARM: Primary | ICD-10-CM

## 2024-05-16 DIAGNOSIS — D04.61 SQUAMOUS CELL CARCINOMA IN SITU (SCCIS) OF SKIN OF RIGHT FOREARM: ICD-10-CM

## 2024-05-16 PROCEDURE — RECHECK: Performed by: DERMATOLOGY

## 2024-05-16 NOTE — PROGRESS NOTES
"[facility] Dermatology Clinic Note     Patient Name: David Grijalva  Encounter Date: May 16, 2024     Have you been cared for by a [facility] Dermatologist in the last 3 years and, if so, which description applies to you?    Yes.  I have been here within the last 3 years, and my medical history has NOT changed since that time.  I am MALE/not capable of bearing children.    REVIEW OF SYSTEMS:  Have you recently had or currently have any of the following? No changes in my recent health.   PAST MEDICAL HISTORY:  Have you personally ever had or currently have any of the following?  If \"YES,\" then please provide more detail. No changes in my medical history.   HISTORY OF IMMUNOSUPPRESSION: Do you have a history of any of the following:  Systemic Immunosuppression such as Diabetes, Biologic or Immunotherapy, Chemotherapy, Organ Transplantation, Bone Marrow Transplantation?  No     Answering \"YES\" requires the addition of the dotphrase \"IMMUNOSUPPRESSED\" as the first diagnosis of the patient's visit.   FAMILY HISTORY:  Any \"first degree relatives\" (parent, brother, sister, or child) with the following?    No changes in my family's known health.   PATIENT EXPERIENCE:    Do you want the Dermatologist to perform a COMPLETE skin exam today including a clinical examination under the \"bra and underwear\" areas?  NO  If necessary, do we have your permission to call and leave a detailed message on your Preferred Phone number that includes your specific medical information?  Yes      No Known Allergies   Current Outpatient Medications:     albuterol (ProAir HFA) 90 mcg/act inhaler, Inhale 2 puffs every 6 (six) hours as needed for wheezing, Disp: 18 g, Rfl: 1    cyanocobalamin (VITAMIN B-12) 1,000 mcg tablet, Take by mouth daily, Disp: , Rfl:     ezetimibe (ZETIA) 10 mg tablet, TAKE 1 TABLET BY MOUTH EVERY DAY, Disp: 90 tablet, Rfl: 1    fluticasone (FLONASE) 50 mcg/act nasal spray, 2 sprays into each nostril daily, Disp: 48 mL, " Rfl: 2    Fluticasone-Salmeterol (Advair Diskus) 250-50 mcg/dose inhaler, Inhale 1 puff 2 (two) times a day Rinse mouth after use., Disp: 60 blister, Rfl: 5    Multiple Vitamin (MULTIVITAMIN) capsule, Take 1 capsule by mouth daily, Disp: , Rfl:     rosuvastatin (CRESTOR) 10 MG tablet, TAKE 1 TABLET BY MOUTH EVERY DAY, Disp: 90 tablet, Rfl: 1          Whom besides the patient is providing clinical information about today's encounter?   NO ADDITIONAL HISTORIAN (patient alone provided history)    Physical Exam and Assessment/Plan by Diagnosis:    Chief complaint: Patient is a 78 y/o male present for suture removal of the Right Upper Arm andRight Forearm, s/p excision x2.    Suture removal    Date/Time: 5/16/2024 2:45 PM    Performed by: Archana Grissom  Authorized by: Luis Juarez MD  Universal Protocol:  Consent: Verbal consent obtained. Written consent not obtained.  Risks and benefits: risks, benefits and alternatives were discussed  Consent given by: patient  Timeout called at: 5/16/2024 3:03 PM.  Patient understanding: patient states understanding of the procedure being performed  Patient consent: the patient's understanding of the procedure matches consent given  Procedure consent: procedure consent matches procedure scheduled  Relevant documents: relevant documents present and verified  Test results: test results available and properly labeled (path pending)  Site marked: the operative site was not marked  Radiology Images displayed and confirmed. If images not available, report reviewed: imaging studies not available  Patient identity confirmed: verbally with patient      Patient location:  Clinic  Location:     Laterality:  Right    Location:  Upper extremity    Upper extremity location: Rigth Upper Arm and Right Forearm.  Procedure details:     Tools used:  Suture removal kit    Wound appearance:  No sign(s) of infection, good wound healing and clean  Post-procedure details:     Post-removal:  Steri-Strips  applied    Patient tolerance of procedure:  Tolerated well, no immediate complications  Comments:      RTC - 6 month skin check

## 2024-05-17 PROCEDURE — 88342 IMHCHEM/IMCYTCHM 1ST ANTB: CPT | Performed by: STUDENT IN AN ORGANIZED HEALTH CARE EDUCATION/TRAINING PROGRAM

## 2024-05-17 PROCEDURE — 88305 TISSUE EXAM BY PATHOLOGIST: CPT | Performed by: STUDENT IN AN ORGANIZED HEALTH CARE EDUCATION/TRAINING PROGRAM

## 2024-05-20 ENCOUNTER — TELEPHONE (OUTPATIENT)
Age: 79
End: 2024-05-20

## 2024-05-20 NOTE — TELEPHONE ENCOUNTER
Left a voicemail for patient letting him know that biopsy report was normal.     Felisa Valente/ignacia  05/20/24  10:30 AM     Stable

## 2024-05-20 NOTE — TELEPHONE ENCOUNTER
----- Message from Luis Juarez MD sent at 5/18/2024 11:51 AM EDT -----  Call patient if not coming in shortly for suture removal to let him know that the margins were clear

## 2024-09-30 ENCOUNTER — OFFICE VISIT (OUTPATIENT)
Age: 79
End: 2024-09-30
Payer: MEDICARE

## 2024-09-30 VITALS
BODY MASS INDEX: 20.66 KG/M2 | HEART RATE: 79 BPM | TEMPERATURE: 98.8 F | WEIGHT: 121 LBS | HEIGHT: 64 IN | RESPIRATION RATE: 18 BRPM

## 2024-09-30 DIAGNOSIS — D22.9 MULTIPLE MELANOCYTIC NEVI: ICD-10-CM

## 2024-09-30 DIAGNOSIS — D18.01 CHERRY ANGIOMA: ICD-10-CM

## 2024-09-30 DIAGNOSIS — L82.1 SEBORRHEIC KERATOSIS: Primary | ICD-10-CM

## 2024-09-30 DIAGNOSIS — Z85.89 HISTORY OF SQUAMOUS CELL CARCINOMA: ICD-10-CM

## 2024-09-30 DIAGNOSIS — Z85.828 HISTORY OF BASAL CELL CARCINOMA: ICD-10-CM

## 2024-09-30 DIAGNOSIS — L57.0 ACTINIC KERATOSIS: ICD-10-CM

## 2024-09-30 PROCEDURE — 17000 DESTRUCT PREMALG LESION: CPT | Performed by: DERMATOLOGY

## 2024-09-30 PROCEDURE — 99214 OFFICE O/P EST MOD 30 MIN: CPT | Performed by: DERMATOLOGY

## 2024-09-30 NOTE — PROGRESS NOTES
"[facility] Note     Patient Name: David Grijalva  Encounter Date: 9/30/24     Have you been cared for by a [facility] Dermatologist in the last 3 years and, if so, which description applies to you?    Yes.  I have been here within the last 3 years, and my medical history has NOT changed since that time.  I am MALE/not capable of bearing children.    REVIEW OF SYSTEMS:  Have you recently had or currently have any of the following? No changes in my recent health.   PAST MEDICAL HISTORY:  Have you personally ever had or currently have any of the following?  If \"YES,\" then please provide more detail. No changes in my medical history.   HISTORY OF IMMUNOSUPPRESSION: Do you have a history of any of the following:  Systemic Immunosuppression such as Diabetes, Biologic or Immunotherapy, Chemotherapy, Organ Transplantation, Bone Marrow Transplantation or Prednisone?  No     Answering \"YES\" requires the addition of the dotphrase \"IMMUNOSUPPRESSED\" as the first diagnosis of the patient's visit.   FAMILY HISTORY:  Any \"first degree relatives\" (parent, brother, sister, or child) with the following?    No changes in my family's known health.   PATIENT EXPERIENCE:    Do you want the Dermatologist to perform a COMPLETE skin exam today including a clinical examination under the \"bra and underwear\" areas?  Yes  If necessary, do we have your permission to call and leave a detailed message on your Preferred Phone number that includes your specific medical information?  Yes      No Known Allergies   Current Outpatient Medications:     albuterol (ProAir HFA) 90 mcg/act inhaler, Inhale 2 puffs every 6 (six) hours as needed for wheezing, Disp: 18 g, Rfl: 1    cyanocobalamin (VITAMIN B-12) 1,000 mcg tablet, Take by mouth daily, Disp: , Rfl:     ezetimibe (ZETIA) 10 mg tablet, TAKE 1 TABLET BY MOUTH EVERY DAY, Disp: 90 tablet, Rfl: 1    fluticasone (FLONASE) 50 mcg/act nasal spray, 2 sprays into each nostril daily, " Disp: 48 mL, Rfl: 2    Fluticasone-Salmeterol (Advair Diskus) 250-50 mcg/dose inhaler, Inhale 1 puff 2 (two) times a day Rinse mouth after use., Disp: 60 blister, Rfl: 5    Multiple Vitamin (MULTIVITAMIN) capsule, Take 1 capsule by mouth daily, Disp: , Rfl:     rosuvastatin (CRESTOR) 10 MG tablet, TAKE 1 TABLET BY MOUTH EVERY DAY, Disp: 90 tablet, Rfl: 1          Whom besides the patient is providing clinical information about today's encounter?   NO ADDITIONAL HISTORIAN (patient alone provided history)    Physical Exam and Assessment/Plan by Diagnosis:  CHIEF COMPLAINT    79 year old male patient presents today for 6 Month Check Up.  Patient has a History of squamous cell carcinoma and basal cell carcinoma     HISTORY OF BASAL CELL CARCINOMA     Physical Exam:  Anatomic Location Affected:  Right Calf - 10/2022  Left Shin and Left Back - 2020  Left Forearm Inferior, Right Shin - 2019  Left Hand and Lower Back- 2017  Morphological Description of scar:  well healed  Suspected Recurrence: No  Pertinent Positives:  Pertinent Negatives:     Additional History of Present Condition:  History of basal cell carcinoma with no sign of recurrence     Assessment and Plan:  Based on a thorough discussion of this condition and the management approach to it (including a comprehensive discussion of the known risks, side effects and potential benefits of treatment), the patient (family) agrees to implement the following specific plan:  Monitor for change     HISTORY OF SQUAMOUS CELL CARCINOMA      Physical Exam:  Anatomic Location Affected:  Right Elbow and Right Lower Back - 04/2022  Left Upper Arm, Right Forehead, Right Shin - 2020  Right Dorsum Hand, Right Upper Arm, Left Forearm, Left Forearm Medial - 2019  Mid Back - 2018  Left Elbow and Right Forearm- 2017  Right Forehead, Left Forearm - 2017  Morphological Description of Scar:  well healed  Suspected Recurrence: no  Regional adenopathy: no     Additional History of Present  Condition:  History of Squamous Cell Carcinoma, treated.     Assessment and Plan:  Based on a thorough discussion of this condition and the management approach to it (including a comprehensive discussion of the known risks, side effects and potential benefits of treatment), the patient (family) agrees to implement the following specific plan:  Monitor for change    ACTINIC KERATOSIS WITH CRYOSURGERY PROCEDURE    ACTINIC KERATOSIS    Physical Exam:  Anatomic Location Affected:  right hand   Morphological Description:  scaly pink patches     Additional History of Present Condition:  present on exam   Physical Exam  Constitutional:              Assessment and Plan:  Based on a thorough discussion of this condition and the management approach to it (including a comprehensive discussion of the known risks, side effects and potential benefits of treatment), the patient (family) agrees to implement the following specific plan:    Treated with cryotherapy in office today-consent form signed by patient in office   Monitor for any changes     Actinic keratoses are very common on sites repeatedly exposed to the sun, especially the backs of the hands and the face.  They are considered precancers and have a low risk of turning into squamous cell carcinoma. It is rare for a solitary actinic keratosis to evolve into a squamous cell carcinoma (SCC), but the risk is 10-15% when more than 10 actinic keratoses are present. A tender, thickened, ulcerated or enlarging actinic keratosis is suspicious of SCC.    Actinic keratoses may be prevented by strict sun protection. If already present, keratoses may improve with a very high sun protection factor (50+) broad-spectrum sunscreen applied at least daily to affected areas, year-round.  We recommend that sun protective clothing and hats and sunglasses be worn whenever possible.  Note that you can make you own UPF 30 rate clothing using just your own washing machine with a product called  "sun guard    There are several different options for treating actinic keratoses    Topical “medications such as 5-fluorouracil or Aldara  - good for field treatment ie treats what's seen and not seen    Cryotherapy - good for single spots but treats “only what we see” versus a field treatment    Photodynamic therapy - involves application of a light sensitizing medicine and then exposure to a special light, also a good field treatment        PROCEDURE:  DESTRUCTION OF PRE-MALIGNANT LESIONS  After a thorough discussion of treatment options and risk/benefits/alternatives (including but not limited to local pain, scarring, dyspigmentation, blistering, and possible superinfection), verbal and written consent were obtained and the aforementioned lesions were treated on with cryotherapy using liquid nitrogen x 1 cycle for 5-10 seconds.    TOTAL NUMBER of 1 pre-malignant lesions were treated today on the ANATOMIC LOCATION: right hand .     The patient tolerated the procedure well, and after-care instructions were provided.      MELANOCYTIC NEVI (\"Moles\")     Physical Exam:  Anatomic Location Affected: Mostly on sun-exposed areas of the trunk and extremities  Morphological Description:  Scattered, 1-4mm round to ovoid, symmetrical-appearing, even bordered, skin colored to dark brown macules/papules, mostly in sun-exposed areas  Pertinent Positives:  Pertinent Negatives:     Additional History of Present Condition:  present on exam     Assessment and Plan:  Based on a thorough discussion of this condition and the management approach to it (including a comprehensive discussion of the known risks, side effects and potential benefits of treatment), the patient (family) agrees to implement the following specific plan:  Provided handout with information regarding the ABCDE's of moles   Recommend routine skin exams every 6 months   Sun avoidance, protective clothing (known as UPF clothing), and the use of at least SPF 30 " "sunscreens is advised. Sunscreen should be reapplied every two hours when outside.      SEBORRHEIC KERATOSIS; NON-INFLAMED     Physical Exam:  Anatomic Location Affected:  scattered across trunk, extremities, face  Morphological Description:  Flat and raised, waxy, smooth to warty textured, yellow to brownish-grey to dark brown to blackish, discrete, \"stuck-on\" appearing papules.  Pertinent Positives:  Pertinent Negatives:     Additional History of Present Condition:  Patient reports new bumps on the skin.  Denies itch, burn, pain, bleeding or ulceration.  Present constantly; nothing seems to make it worse or better.  No prior treatment.       Assessment and Plan:  Based on a thorough discussion of this condition and the management approach to it (including a comprehensive discussion of the known risks, side effects and potential benefits of treatment), the patient (family) agrees to implement the following specific plan:  Reassured benign     ANGIOMA (\"CHERRY ANGIOMA\")    Physical Exam:  Anatomic Location: scattered across sun exposed areas of the trunk and extremities   Morphologic Description: Firm red to reddish-blue discrete papules  Pertinent Positives:  Pertinent Negatives:     Additional History of Present Condition:  Present on exam.      Assessment and Plan:  Reassured benign    Scribe Attestation      I,:  Viridiana Quesada MA am acting as a scribe while in the presence of the attending physician.:       I,:  Luis Juarez MD personally performed the services described in this documentation    as scribed in my presence.:            "

## 2024-09-30 NOTE — PATIENT INSTRUCTIONS
ACTINIC KERATOSIS    Actinic keratoses are very common on sites repeatedly exposed to the sun, especially the backs of the hands and the face, most often affecting the ears, nose, cheeks, upper lip, vermilion of the lower lip, temples, forehead and balding scalp. In severely chronically sun-damaged individuals, they may also be found on the upper trunk, upper and lower limbs, and dorsum of feet.    We discussed the theoretical premalignant (“pre-cancerous”) nature and etiology of these growths.  We discussed the prevailing notion that actinic keratoses are a reflection of abnormal skin cell development due to DNA damage by short wavelength UVB.  They are more likely to appear if the immune function is poor, due to aging, recent sun exposure, predisposing disease or certain drugs.    We discussed that the main concern is that actinic keratoses may predispose to squamous cell carcinoma. It is rare for a solitary actinic keratosis to evolve to squamous cell carcinoma (SCC), but the risk of SCC occurring at some stage in a patient with more than 10 actinic keratoses is thought to be about 10 to 15%. A tender, thickened, ulcerated or enlarging actinic keratosis is suspicious of SCC.    Actinic keratoses may be prevented by strict sun protection. If already present, keratoses may improve with a very high sun protection factor (50+) broad-spectrum sunscreen applied at least daily to affected areas, year-round.  We recommend that UPF-rated clothing and hats and sunglasses be worn whenever possible and that a sunscreen-moisturizer combination product such as Neutrogena Daily Defense be applied at least three times a day.    We performed a thorough discussion of treatment options and specific risk/benefits/alternatives including but not limited to medical “field” treatment with medications such as the following:    Topical “field area” medications such as 5-fluorouracil or Aldara (specifically, the trouble with long-term  compliance, blistering and local skin reaction versus the convenience of at-home therapy and that field therapy “gets what is not yet seen”).    Cryotherapy (specifically, local pain, scarring, dyspigmentation, blistering, possible superinfection, and treats “only what we see” versus directed treatment today).    Photodynamic therapy (specifically, local pain, scarring, dyspigmentation, blistering, possible superinfection, need to schedule for a later date, and time spent in the office versus field therapy that “gets what is not yet seen”).      BASAL CELL CARCINOMA    What is basal cell carcinoma?  Basal cell carcinoma (BCC) is a common, locally invasive, keratinocytic, or non-melanoma, skin cancer. It is also known as rodent ulcer and basalioma. Patients with BCC often develop multiple primary tumours over time.    Who gets basal cell carcinoma?  Risk factors for BCC include:  Age and sex: BCCs are particularly prevalent in elderly males. However, they also affect females and younger adults   Previous BCC or other form of skin cancer (squamous cell carcinoma, melanoma)   Sun damage (photoaging, actinic keratoses)   Repeated prior episodes of sunburn   Fair skin, blue eyes and blond or red hair--note; BCC can also affect darker skin types   Previous cutaneous injury, thermal burn, disease (eg cutaneous lupus, sebaceous naevus)   Inherited syndromes: BCC is a particular problem for families with basal cell naevus syndrome (Gorlin syndrome), Qdjrp-Mblsé-Igufbhtg syndrome, Rombo syndrome, Oley syndrome and xeroderma pigmentosum   Other risk factors include ionising radiation, exposure to arsenic, and immune suppression due to disease or medicines    What causes basal cell carcinoma?  The cause of BCC is multifactorial.  Most often, there are DNA mutations in the patched (PTCH) tumour suppressor gene, part of hedgehog signaling pathway   These may be triggered by exposure to ultraviolet radiation   Various spontaneous  and inherited gene defects predispose to BCC    What are the clinical features of basal cell carcinoma?  BCC is a locally invasive skin tumour. The main characteristics are:  Slowly growing plaque or nodule   Skin coloured, pink or pigmented   Varies in size from a few millimetres to several centimetres in diameter   Spontaneous bleeding or ulceration  BCC is very rarely a threat to life. A tiny proportion of BCCs grow rapidly, invade deeply, and/or metastasise to local lymph nodes.    Types of basal cell carcinoma  There are several distinct clinical types of BCC, and over 20 histological growth patterns of BCC.  Nodular BCC  Most common type of facial BCC   Shiny or pearly nodule with a smooth surface   May have central depression or ulceration, so its edges appear rolled   Blood vessels cross its surface   Cystic variant is soft, with jelly-like contents   Micronodular, microcystic and infiltrative types are potentially aggressive subtypes   Also known as nodulocystic carcinoma  Superficial BCC  Most common type in younger adults   Most common type on upper trunk and shoulders   Slightly scaly, irregular plaque   Thin, translucent rolled border   Multiple microerosions  Morphoeaform BCC  Usually found in mid-facial sites   Waxy, scar-like plaque with indistinct borders   Wide and deep subclinical extension   May infiltrate cutaneous nerves (perineural spread)   Also known as morpheic, morphoeiform or sclerosing BCC  Basosquamous carcinoma  Mixed basal cell carcinoma (BCC) and squamous cell carcinoma (SCC)   Infiltrative growth pattern   Potentially more aggressive than other forms of BCC   Also known as basisquamous carcinoma and mixed basal-squamous cell carcinoma       Complications of basal cell carcinoma    Recurrent BCC  Recurrence of BCC after initial treatment is not uncommon. Characteristics of recurrent BCC often include:  Incomplete excision or narrow margins at primary excision   Morphoeic,  micronodular, and infiltrative subtypes   Location on head and neck    Advanced BCC  Advanced BCCs are large, often neglected tumours.  They may be several centimetres in diameter   They may be deeply infiltrating into tissues below the skin   They are difficult or impossible to treat surgically    Metastatic BCC  Very rare   Primary tumour is often large, neglected or recurrent, located on head and neck, with aggressive subtype   May have had multiple prior treatments   May arise in site exposed to ionising radiation   Can be fatal    How is basal cell carcinoma diagnosed?  BCC is diagnosed clinically by the presence of a slowly enlarging skin lesion with typical appearance. The diagnosis and  by a diagnostic biopsy or following excision.  Some typical superficial BCCs on trunk and limbs are clinically diagnosed and have non-surgical treatment without histology.    What is the treatment for primary basal cell carcinoma?  The treatment for a BCC depends on its type, size and location, the number to be treated, patient factors, and the preference or expertise of the doctor. Most BCCs are treated surgically. Long-term follow-up is recommended to check for new lesions and recurrence; the latter may be unnecessary if histology has reported wide clear margins.    Excision biopsy  Excision means the lesion is cut out and the skin stitched up.  Most appropriate treatment for nodular, infiltrative and morphoeic BCCs   Should include 3 to 5 mm margin of normal skin around the tumour   Very large lesions may require flap or skin graft to repair the defect   Pathologist will report deep and lateral margins   Further surgery is recommended for lesions that are incompletely excised    Mohs micrographically controlled excision  Mohs micrographically controlled surgery involves examining carefully marked excised tissue under the microscope, layer by layer, to ensure complete excision.  Very high cure rates achieved by trained Mohs  surgeons   Used in high-risk areas of the face around eyes, lips and nose   Suitable for ill-defined, morphoeic, infiltrative and recurrent subtypes   Large defects are repaired by flap or skin graft    Superficial skin surgery  Superficial skin surgery comprises shave, curettage, and electrocautery. It is a rapid technique using local anaesthesia and does not require sutures.  Suitable for small, well-defined nodular or superficial BCCs   Lesions are usually located on trunk or limbs   Wound is left open to heal by secondary intention   Moist wound dressings lead to healing within a few weeks   Eventual scar quality variable    Cryotherapy  Cryotherapy is the treatment of a superficial skin lesion by freezing it, usually with liquid nitrogen.  Suitable for small superficial BCCs on covered areas of trunk and limbs   Best avoided for BCCs on head and neck, and distal to knees   Double freeze-thaw technique   Results in a blister that crusts over and heals within several weeks.   Leaves permanent white deysi    Photodynamic therapy  Photodynamic therapy (PDT) refers to a technique in which BCC is treated with a photosensitising chemical, and exposed to light several hours later.  Topical photosensitisers include aminolevulinic acid lotion and methyl aminolevulinate cream   Suitable for low-risk small, superficial BCCs   Best avoided if tumour in site at high risk of recurrence   Results in inflammatory reaction, maximal 3-4 days after procedure   Treatment repeated 7 days after initial treatment   Excellent cosmetic results    Imiquimod cream  Imiquimod is an immune response modifier.  Best used for superficial BCCs less than 2 cm diameter   Applied three to five times each week, for 6-16 weeks   Results in a variable inflammatory reaction, maximal at three weeks   Minimal scarring is usual    Fluorouracil cream  5-Fluorouracil cream is a topical cytotoxic agent.  Used to treat small superficial basal cell carcinomas    Requires prolonged course, eg twice daily for 6-12 weeks   Causes inflammatory reaction   Has high recurrence rates    Radiotherapy  Radiotherapy or X-ray treatment can be used to treat primary BCCs or as adjunctive treatment if margins are incomplete.  Mainly used if surgery is not suitable   Best avoided in young patients and in genetic conditions predisposing to skin cancer   Best cosmetic results achieved using multiple fractions   Typically, patient attends once-weekly for several weeks   Causes inflammatory reaction followed by scar   Risk of radiodermatitis, late recurrence, and new tumours    What is the treatment for advanced or metastatic basal cell carcinoma?  Locally advanced primary, recurrent or metastatic BCC requires multidisciplinary consultation. Often a combination of treatments is used.  Surgery   Radiotherapy   Targeted therapy  Targeted therapy refers to the hedgehog signalling pathway inhibitors, vismodegib and sonidegib. These drugs have some important risks and side effects.    How can basal cell carcinoma be prevented?  The most important way to prevent BCC is to avoid sunburn. This is especially important in childhood and early life. Fair skinned individuals and those with a personal or family history of BCC should protect their skin from sun exposure daily, year-round and lifelong.  Stay indoors or under the shade in the middle of the day   Wear covering clothing   Apply high protection factor SPF50+ broad-spectrum sunscreens generously to exposed skin if outdoors   Avoid indoor tanning (sun beds, solaria)  Oral nicotinamide (vitamin B3) in a dose of 500 mg twice daily may reduce the number and severity of BCCs.    What is the outlook for basal cell carcinoma?  Most BCCs are cured by treatment. Cure is most likely if treatment is undertaken when the lesion is small.  About 50% of people with BCC develop a second one within 3 years of the first. They are also at increased risk of other  skin cancers, especially melanoma. Regular self-skin examinations and long-term annual skin checks by an experienced health professional are recommended.        SQUAMOUS CELL CARCINOMA    What is cutaneous squamous cell carcinoma?  Cutaneous squamous cell carcinoma (SCC) is a common type of keratinocyte or non-melanoma skin cancer. It is derived from cells within the epidermis that make keratin -- the horny protein that makes up skin, hair and nails.  Cutaneous SCC is an invasive disease, referring to cancer cells that have grown beyond the epidermis. SCC can sometimes metastasise and may prove fatal.  Intraepidermal carcinoma (cutaneous SCC in situ) and mucosal SCC are considered elsewhere.    Who gets cutaneous squamous cell carcinoma?  Risk factors for cutaneous SCC include:  Age and sex: SCCs are particularly prevalent in elderly males. However, they also affect females and younger adults.   Previous SCC or another form of skin cancer (basal cell carcinoma, melanoma) are a strong predictor for further skin cancers.   Actinic keratoses   Outdoor occupation or recreation   Smoking   Fair skin, blue eyes and blond or red hair   Previous cutaneous injury, thermal burn, disease (eg cutaneous lupus, epidermolysis bullosa, leg ulcer)   Inherited syndromes: SCC is a particular problem for families with xeroderma pigmentosum and albinism   Other risk factors include ionising radiation, exposure to arsenic, and immune suppression due to disease (eg chronic lymphocytic leukaemia) or medicines. Organ transplant recipients have a massively increased risk of developing SCC.    What causes cutaneous squamous cell carcinoma?  More than 90% of cases of SCC are associated with numerous DNA mutations in multiple somatic genes. Mutations in the p53 tumour suppressor gene are caused by exposure to ultraviolet radiation (UV), especially UVB (known as signature 7). Other signature mutations relate to cigarette smoking, ageing and  immune suppression (eg, to drugs such as azathioprine). Mutations in signalling pathways affect the epidermal growth factor receptor, JENNIFER, Fyn, and [id number] signalling.   Beta-genus human papillomaviruses (wart virus) are thought to play a role in SCC arising in immune-suppressed populations. ?-HPV and HPV subtypes 5, 8, 17, 20, 24, and 38 have also been associated with an increased risk of cutaneous SCC in immunocompetent individuals.     What are the clinical features of cutaneous squamous cell carcinoma?  Cutaneous SCCs present as enlarging scaly or crusted lumps. They usually arise within pre-existing actinic keratosis or intraepidermal carcinoma.  They grow over weeks to months   They may ulcerate   They are often tender or painful   Located on sun-exposed sites, particularly the face, lips, ears, hands, forearms and lower legs   Size varies from a few millimetres to several centimetres in diameter.    Types of cutaneous squamous cell carcinoma  Distinct clinical types of invasive cutaneous SCC include:  Cutaneous horn -- the horn is due to excessive production of keratin   Keratoacanthoma (KA) -- a rapidly growing keratinising nodule that may resolve without treatment   Carcinoma cuniculatum (‘verrucous carcinoma’), a slow-growing, warty tumour on the sole of the foot.   Multiple eruptive SCC/KA-like lesions arising in syndromes, such as multiple self-healing squamous epitheliomas of Taylor-Smith and Grzybowski syndrome  The pathologist may classify a tumour as well differentiated, moderately well differentiated, poorly differentiated or anaplastic cutaneous SCC. There are other variants.    Classification of squamous cell carcinoma by risk  Cutaneous SCC is classified as low-risk or high-risk, depending on the chance of tumour recurrence and metastasis. Characteristics of high-risk SCC include:  High-risk cutaneous squamous cell carcinoma has the following characteristics:  Diameter greater than or equal to  2 cm   Location on the ear, vermilion of the lip, central face, hands, feet, genitalia   Arising in elderly or immune suppressed patient   Histological thickness greater than 2 mm, poorly differentiated histology, or with the invasion of the subcutaneous tissue, nerves and blood vessels  Metastatic SCC is found in regional lymph nodes (80%), lungs, liver, brain, bones and skin.    Staging cutaneous squamous cell carcinoma  In 2011, the American Joint Committee on Cancer (AJCC) published a new staging systemic for cutaneous SCC for the 7th Edition of the AJCC manual. This evaluates the dimensions of the original primary tumour (T) and its metastases to lymph nodes (N).    Tumour staging for cutaneous SCC  TX: Th Primary tumour cannot be assessed  T0: No evidence of a primary tumour  Tis: Carcinoma in situ  T1: Tumour <= 2cm without high-risk features  T2: Tumour >= 2cm; or; Tumour <= 2 cm with high-risk features  T3: Tumour with the invasion of maxilla, mandible, orbit or temporal bone  T4: Tumour with the invasion of axial or appendicular skeleton or perineural invasion of skull base    Titi staging for cutaneous SCC  NX: Regional lymph nodes cannot be assessed  N0: No regional lymph node metastasis  N1: Metastasis in one local lymph node <= 3cm  N2: Metastasis in one local lymph node >= 3cm; or; Metastasis in >1 local lymph node <= 6cm  N3: Metastasis in lymph node >= 6cm    How is squamous cell carcinoma diagnosed?  Diagnosis of cutaneous SCC is based on clinical features. The diagnosis and histological subtype are confirmed pathologically by diagnostic biopsy or following excision. See squamous cell carcinoma - pathology.  Patients with high-risk SCC may also undergo staging investigations to determine whether it has spread to lymph nodes or elsewhere. These may include:  Imaging using ultrasound scan, X-rays, CT scans, MRI scans   Lymph node or other tissue biopsies    What is the treatment for cutaneous  squamous cell carcinoma?  Cutaneous SCC is nearly always treated surgically. Most cases are excised with a 3-10 mm margin of normal tissue around a visible tumour. A flap or skin graft may be needed to repair the defect.  Other methods of removal include:  Shave, curettage, and electrocautery for low-risk tumours on trunk and limbs   Aggressive cryotherapy for very small, thin, low-risk tumours   Mohs micrographic surgery for large facial lesions with indistinct margins or recurrent tumours   Radiotherapy for an inoperable tumour, patients unsuitable for surgery, or as adjuvant    What is the treatment for advanced or metastatic squamous cell carcinoma?  Locally advanced primary, recurrent or metastatic SCC requires multidisciplinary consultation. Often a combination of treatments is used.  Surgery   Radiotherapy   Cemiplimab   Experimental targeted therapy using epidermal growth factor receptor inhibitors    How can cutaneous squamous cell carcinoma be prevented?  There is a great deal of evidence to show that very careful sun protection at any time of life reduces the number of SCCs. This is particularly important in ageing, sun-damaged, fair skin; in patients that are immune suppressed; and in those who already have actinic keratoses or previous SCC.  Stay indoors or under the shade in the middle of the day   Wear covering clothing   Apply high protection factor SPF50+ broad-spectrum sunscreens generously to exposed skin if outdoors   Avoid indoor tanning (sun beds, solaria)    Oral nicotinamide (vitamin B3) in a dose of 500 mg twice daily may reduce the number and severity of SCCs in people at high risk.  Patients with multiple squamous cell carcinomas may be prescribed an oral retinoid (acitretin or isotretinoin). These reduce the number of tumours but have some nuisance side effects.    What is the outlook for cutaneous squamous cell carcinoma?  Most SCCs are cured by treatment. A cure is most likely if  "treatment is undertaken when the lesion is small. The risk of recurrence or disease-associated death is greater for tumours that are > 20 mm in diameter and/or > 2 mm in thickness at the time of surgical excision.  About 50% of people at high risk of SCC develop a second one within 5 years of the first. They are also at increased risk of other skin cancers, especially melanoma. Regular self-skin examinations and long-term annual skin checks by an experienced health professional are recommended.           MELANOCYTIC NEVI (\"Moles\")    Melanocytic nevi (\"moles\") are tan or brown, raised or flat areas of the skin which have an increased number of melanocytes. Melanocytes are the cells in our body which make pigment and account for skin color.    Some moles are present at birth (I.e., \"congenital nevi\"), while others come up later in life (i.e., \"acquired nevi\").  The sun can stimulate the body to make more moles.  Sunburns are not the only thing that triggers more moles.  Chronic sun exposure can do it too.     Clinically distinguishing a healthy mole from melanoma may be difficult, even for experienced dermatologists. The \"ABCDE's\" of moles have been suggested as a means of helping to alert a person to a suspicious mole and the possible increased risk of melanoma.  The suggestions for raising alert are as follows:    Asymmetry: Healthy moles tend to be symmetric, while melanomas are often asymmetric.  Asymmetry means if you draw a line through the mole, the two halves do not match in color, size, shape, or surface texture. Asymmetry can be a result of rapid enlargement of a mole, the development of a raised area on a previously flat lesion, scaling, ulceration, bleeding or scabbing within the mole.  Any mole that starts to demonstrate \"asymmetry\" should be examined promptly by a board certified dermatologist.     Border: Healthy moles tend to have discrete, even borders.  The border of a melanoma often blends into the " "normal skin and does not sharply delineate the mole from normal skin.  Any mole that starts to demonstrate \"uneven borders\" should be examined promptly by a board certified dermatologist.     Color: Healthy moles tend to be one color throughout.  Melanomas tend to be made up of different colors ranging from dark black, blue, white, or red.  Any mole that demonstrates a color change should be examined promptly by a board certified dermatologist.     Diameter: Healthy moles tend to be smaller than 0.6 cm in size; an exception are \"congenital nevi\" that can be larger.  Melanomas tend to grow and can often be greater than 0.6 cm (1/4 of an inch, or the size of a pencil eraser). This is only a guideline, and many normal moles may be larger than 0.6 cm without being unhealthy.  Any mole that starts to change in size (small to bigger or bigger to smaller) should be examined promptly by a board certified dermatologist.     Evolving: Healthy moles tend to \"stay the same.\"  Melanomas may often show signs of change or evolution such as a change in size, shape, color, or elevation.  Any mole that starts to itch, bleed, crust, burn, hurt, or ulcerate or demonstrate a change or evolution should be examined promptly by a board certified dermatologist.      Dysplastic Nevi  Dysplastic moles are moles that fit the ABCDE rules of melanoma but are not identified as melanomas when examined under the microscope.  They may indicate an increased risk of melanoma in that person. If there is a family history of melanoma, most experts agree that the person may be at an increased risk for developing a melanoma.  Experts still do not agree on what dysplastic moles mean in patients without a personal or family history of melanoma.  Dysplastic moles are usually larger than common moles and have different colors within it with irregular borders. The appearance can be very similar to a melanoma. Biopsies of dysplastic moles may show abnormalities " "which are different from a regular mole.      Melanoma  Malignant melanoma is a type of skin cancer that can be deadly if it spreads throughout the body. The incidence of melanoma in the [place] is growing faster than any other cancer. Melanoma usually grows near the surface of the skin for a period of time, and then begins to grow deeper into the skin. Once it grows deeper into the skin, the risk of spread to other organs greatly increases. Therefore, early detection and removal of a malignant melanoma may result in a better chance at a complete cure; removal after the tumor has spread may not be as effective, leading to worse clinical outcomes such as death.    The true rate of nevus transformation into a melanoma is unknown. It has been estimated that the lifetime risk for any acquired melanocytic nevus on any 20-year-old individual transforming into melanoma by age 80 is 0.03% (1 in 3,164) for men and 0.009% (1 in 10,800) for women.     The appearance of a \"new mole\" remains one of the most reliable methods for identifying a malignant melanoma.  Occasionally, melanomas appear as rapidly growing, blue-black, dome-shaped bumps within a previous mole or previous area of normal skin.  Other times, melanomas are suspected when a mole suddenly appears or changes. Itching, burning, or pain in a pigmented lesion should increase suspicion, but most patients with early melanoma have no skin discomfort whatsoever.  Melanoma can occur anywhere on the skin, including areas that are difficult for self-examination. Many melanomas are first noticed by other family members.  Suspicious-looking moles may be removed for microscopic examination.       You may be able to prevent death from melanoma by doing two simple things:    Try to avoid unnecessary sun exposure and protect your skin when it is exposed to the sun.  People who live near the equator, people who have intermittent exposures to large amounts of sun, and people " "who have had sunburns in childhood or adolescence have an increased risk for melanoma. Sun sense and vigilant sun protection may be keys to helping to prevent melanoma.  We recommend wearing UPF-rated sun protective clothing and sunglasses whenever possible and applying a moisturizer-sunscreen combination product (SPF 50+) such as Neutrogena Daily Defense to sun exposed areas of skin at least three times a day.    Have your moles regularly examined by a board certified dermatologist AND by yourself or a family member/friend at home.  We recommend that you have your moles examined at least once a year by a board certified dermatologist.  Use your birthday as an annual reminder to have your \"Birthday Suit\" (I.e., your skin) examined; it is a nice birthday gift to yourself to know that your skin is healthy appearing!  Additionally, at-home self examinations may be helpful for detecting a possible melanoma.  Use the ABCDEs we discussed and check your moles once a month at home.        SEBORRHEIC KERATOSIS  A seborrheic keratosis is a harmless warty spot that appears during adult life as a common sign of skin aging.  Seborrheic keratoses can arise on any area of skin, covered or uncovered, with the usual exception of the palms and soles. They do not arise from mucous membranes. Seborrheic keratoses can have highly variable appearance.      Seborrheic keratoses are extremely common. It has been estimated that over 90% of adults over the age of 60 years have one or more of them. They occur in males and females of all races, typically beginning to erupt in the 30s or 40s. They are uncommon under the age of 20 years.  The precise cause of seborrhoeic keratoses is not known.  Seborrhoeic keratoses are considered degenerative in nature. As time goes by, seborrheic keratoses tend to become more numerous. Some people inherit a tendency to develop a very large number of them; some people may have hundreds of them.    The name " "\"seborrheic keratosis\" is misleading, because these lesions are not limited to a seborrhoeic distribution (scalp, mid-face, chest, upper back), nor are they formed from sebaceous glands, nor are they associated with sebum -- which is greasy.  Seborrheic keratosis may also be called \"SK,\" \"Seb K,\" \"basal cell papilloma,\" \"senile wart,\" or \"barnacle.\"      There is no easy way to remove multiple lesions on a single occasion.  Unless a specific lesion is \"inflamed\" and is causing pain or stinging/burning or is bleeding, most insurance companies do not authorize treatment.      ANGIOMA (\"CHERRY ANGIOMA\")  Dean angiomas markedly increase in number from about the age of 40, so it has been estimated that 75% of people over 75 years of age have them. Although they also called \"senile angiomas,\" they can occur in young people too - 5% of adolescents have been found to have them.     Cherry angiomas are very common in males and females of any age or race, with no difference in sexes or races affected. They are however more noticeable in white skin than in skin of colour.  There may be a family history of similar lesions. Eruptive (very large number appearing in a short period of time) cherry angiomas have been rarely reported to be associated with internal malignancy and pregnancy.   "

## 2024-11-05 ENCOUNTER — OFFICE VISIT (OUTPATIENT)
Dept: FAMILY MEDICINE CLINIC | Facility: MEDICAL CENTER | Age: 79
End: 2024-11-05
Payer: MEDICARE

## 2024-11-05 ENCOUNTER — APPOINTMENT (OUTPATIENT)
Dept: LAB | Facility: MEDICAL CENTER | Age: 79
End: 2024-11-05
Payer: MEDICARE

## 2024-11-05 VITALS
DIASTOLIC BLOOD PRESSURE: 80 MMHG | HEART RATE: 68 BPM | HEIGHT: 64 IN | SYSTOLIC BLOOD PRESSURE: 146 MMHG | OXYGEN SATURATION: 97 % | WEIGHT: 132.6 LBS | RESPIRATION RATE: 18 BRPM | BODY MASS INDEX: 22.64 KG/M2 | TEMPERATURE: 98.9 F

## 2024-11-05 DIAGNOSIS — J45.40 MODERATE PERSISTENT ASTHMA WITHOUT COMPLICATION: ICD-10-CM

## 2024-11-05 DIAGNOSIS — Z23 ENCOUNTER FOR IMMUNIZATION: ICD-10-CM

## 2024-11-05 DIAGNOSIS — E78.2 MIXED HYPERLIPIDEMIA: Primary | ICD-10-CM

## 2024-11-05 DIAGNOSIS — E78.2 MIXED HYPERLIPIDEMIA: ICD-10-CM

## 2024-11-05 DIAGNOSIS — Z12.5 SCREENING FOR MALIGNANT NEOPLASM OF PROSTATE: ICD-10-CM

## 2024-11-05 DIAGNOSIS — J30.1 CHRONIC SEASONAL ALLERGIC RHINITIS DUE TO POLLEN: ICD-10-CM

## 2024-11-05 LAB
ALBUMIN SERPL BCG-MCNC: 3.8 G/DL (ref 3.5–5)
ALP SERPL-CCNC: 56 U/L (ref 34–104)
ALT SERPL W P-5'-P-CCNC: 22 U/L (ref 7–52)
ANION GAP SERPL CALCULATED.3IONS-SCNC: 8 MMOL/L (ref 4–13)
AST SERPL W P-5'-P-CCNC: 24 U/L (ref 13–39)
BASOPHILS # BLD AUTO: 0.08 THOUSANDS/ΜL (ref 0–0.1)
BASOPHILS NFR BLD AUTO: 1 % (ref 0–1)
BILIRUB SERPL-MCNC: 0.31 MG/DL (ref 0.2–1)
BUN SERPL-MCNC: 20 MG/DL (ref 5–25)
CALCIUM SERPL-MCNC: 9.3 MG/DL (ref 8.4–10.2)
CHLORIDE SERPL-SCNC: 104 MMOL/L (ref 96–108)
CHOLEST SERPL-MCNC: 145 MG/DL
CO2 SERPL-SCNC: 28 MMOL/L (ref 21–32)
CREAT SERPL-MCNC: 0.81 MG/DL (ref 0.6–1.3)
EOSINOPHIL # BLD AUTO: 0.41 THOUSAND/ΜL (ref 0–0.61)
EOSINOPHIL NFR BLD AUTO: 5 % (ref 0–6)
ERYTHROCYTE [DISTWIDTH] IN BLOOD BY AUTOMATED COUNT: 12.8 % (ref 11.6–15.1)
GFR SERPL CREATININE-BSD FRML MDRD: 83 ML/MIN/1.73SQ M
GLUCOSE SERPL-MCNC: 87 MG/DL (ref 65–140)
HCT VFR BLD AUTO: 43.7 % (ref 36.5–49.3)
HDLC SERPL-MCNC: 45 MG/DL
HGB BLD-MCNC: 14.3 G/DL (ref 12–17)
IMM GRANULOCYTES # BLD AUTO: 0.02 THOUSAND/UL (ref 0–0.2)
IMM GRANULOCYTES NFR BLD AUTO: 0 % (ref 0–2)
LDLC SERPL CALC-MCNC: 70 MG/DL (ref 0–100)
LYMPHOCYTES # BLD AUTO: 1.76 THOUSANDS/ΜL (ref 0.6–4.47)
LYMPHOCYTES NFR BLD AUTO: 23 % (ref 14–44)
MCH RBC QN AUTO: 32.4 PG (ref 26.8–34.3)
MCHC RBC AUTO-ENTMCNC: 32.7 G/DL (ref 31.4–37.4)
MCV RBC AUTO: 99 FL (ref 82–98)
MONOCYTES # BLD AUTO: 0.79 THOUSAND/ΜL (ref 0.17–1.22)
MONOCYTES NFR BLD AUTO: 10 % (ref 4–12)
NEUTROPHILS # BLD AUTO: 4.53 THOUSANDS/ΜL (ref 1.85–7.62)
NEUTS SEG NFR BLD AUTO: 61 % (ref 43–75)
NONHDLC SERPL-MCNC: 100 MG/DL
NRBC BLD AUTO-RTO: 0 /100 WBCS
PLATELET # BLD AUTO: 243 THOUSANDS/UL (ref 149–390)
PMV BLD AUTO: 10.2 FL (ref 8.9–12.7)
POTASSIUM SERPL-SCNC: 4.4 MMOL/L (ref 3.5–5.3)
PROT SERPL-MCNC: 7 G/DL (ref 6.4–8.4)
PSA SERPL-MCNC: 0.82 NG/ML (ref 0–4)
RBC # BLD AUTO: 4.42 MILLION/UL (ref 3.88–5.62)
SODIUM SERPL-SCNC: 140 MMOL/L (ref 135–147)
TRIGL SERPL-MCNC: 151 MG/DL
TSH SERPL DL<=0.05 MIU/L-ACNC: 2.2 UIU/ML (ref 0.45–4.5)
WBC # BLD AUTO: 7.59 THOUSAND/UL (ref 4.31–10.16)

## 2024-11-05 PROCEDURE — 36415 COLL VENOUS BLD VENIPUNCTURE: CPT

## 2024-11-05 PROCEDURE — G0103 PSA SCREENING: HCPCS

## 2024-11-05 PROCEDURE — 80061 LIPID PANEL: CPT

## 2024-11-05 PROCEDURE — 85025 COMPLETE CBC W/AUTO DIFF WBC: CPT

## 2024-11-05 PROCEDURE — 80053 COMPREHEN METABOLIC PANEL: CPT

## 2024-11-05 PROCEDURE — 90662 IIV NO PRSV INCREASED AG IM: CPT | Performed by: INTERNAL MEDICINE

## 2024-11-05 PROCEDURE — 84443 ASSAY THYROID STIM HORMONE: CPT

## 2024-11-05 PROCEDURE — 99214 OFFICE O/P EST MOD 30 MIN: CPT | Performed by: INTERNAL MEDICINE

## 2024-11-05 PROCEDURE — G0008 ADMIN INFLUENZA VIRUS VAC: HCPCS | Performed by: INTERNAL MEDICINE

## 2024-11-05 NOTE — PROGRESS NOTES
Including ambulatory Visit  Name: David Grijalva      : 1945      MRN: [id number]  Encounter Provider: Xuan España MD  Encounter Date: 2024   Encounter department: [facility] [place]    Assessment & Plan  Mixed hyperlipidemia  Continue Crestor, lipid profile is stable  Orders:    CBC and differential; Future    Comprehensive metabolic panel; Future    Lipid panel; Future    TSH, 3rd generation; Future    Moderate persistent asthma without complication  Continue inhalers, symptoms are under control       Chronic seasonal allergic rhinitis due to pollen  Continue Flonase nasal spray       Screening for malignant neoplasm of prostate    Orders:    PSA, Total Screen; Future    Encounter for immunization    Orders:    influenza vaccine, high-dose, PF 0.5 mL (Fluzone High Dose)      Depression Screening and Follow-up Plan: Patient was screened for depression during today's encounter. They screened negative with a PHQ-2 score of 0.      History of Present Illness     HPI  This is a patient of Dr. Hoffman who needs to establish.  He is here for follow-up of hyperlipidemia, asthma, seasonal allergies.  He is doing very well and taking his medications regularly.  The results of the blood work were discussed with him      Review of Systems   Constitutional:  Negative for chills, diaphoresis, fatigue and fever.   HENT:  Negative for congestion, ear discharge, ear pain, hearing loss, postnasal drip, rhinorrhea, sinus pressure, sinus pain, sneezing, sore throat and voice change.    Eyes:  Positive for discharge. Negative for pain, redness and visual disturbance.   Respiratory:  Negative for cough, chest tightness, shortness of breath and wheezing.    Cardiovascular:  Negative for chest pain, palpitations and leg swelling.   Gastrointestinal:  Negative for abdominal distention, abdominal pain, blood in stool, constipation, diarrhea, nausea and vomiting.   Endocrine: Negative for cold  "intolerance, heat intolerance, polydipsia, polyphagia and polyuria.   Genitourinary:  Negative for dysuria, flank pain, frequency, hematuria and urgency.   Musculoskeletal:  Negative for arthralgias, back pain, gait problem, joint swelling, myalgias, neck pain and neck stiffness.   Skin:  Negative for rash.   Neurological:  Negative for dizziness, tremors, syncope, facial asymmetry, speech difficulty, weakness, light-headedness, numbness and headaches.   Hematological:  Does not bruise/bleed easily.   Psychiatric/Behavioral:  Negative for behavioral problems, confusion and sleep disturbance. The patient is not nervous/anxious.      Medical History Reviewed by provider this encounter:  Tobacco  Allergies  Meds  Problems  Med Hx  Surg Hx  Fam Hx           Objective     /80 (BP Location: Left arm, Patient Position: Sitting, Cuff Size: Standard)   Pulse 68   Temp 98.9 °F (37.2 °C) (Temporal)   Resp 18   Ht 5' 4\" (1.626 m)   Wt 60.1 kg (132 lb 9.6 oz)   SpO2 97%   BMI 22.76 kg/m²     Physical Exam  Constitutional:       General: He is not in acute distress.     Appearance: He is well-developed. He is not diaphoretic.   HENT:      Head: Normocephalic and atraumatic.      Right Ear: External ear normal.      Left Ear: External ear normal.      Nose: Nose normal.   Eyes:      General: No scleral icterus.        Right eye: Discharge present.         Left eye: No discharge.      Conjunctiva/sclera: Conjunctivae normal.   Cardiovascular:      Rate and Rhythm: Normal rate and regular rhythm.      Heart sounds: Normal heart sounds. No murmur heard.     No friction rub. No gallop.   Pulmonary:      Effort: Pulmonary effort is normal. No respiratory distress.      Breath sounds: Normal breath sounds. No wheezing or rales.   Abdominal:      General: Bowel sounds are normal. There is no distension.      Palpations: Abdomen is soft.      Tenderness: There is no abdominal tenderness. There is no guarding or " rebound.   Musculoskeletal:         General: No tenderness.   Skin:     General: Skin is warm and dry.      Findings: No erythema or rash.   Neurological:      Mental Status: He is alert and oriented to person, place, and time.      Cranial Nerves: No cranial nerve deficit.      Sensory: No sensory deficit.      Motor: No abnormal muscle tone.   Psychiatric:         Behavior: Behavior normal.

## 2024-11-05 NOTE — ASSESSMENT & PLAN NOTE
Continue Crestor, lipid profile is stable  Orders:    CBC and differential; Future    Comprehensive metabolic panel; Future    Lipid panel; Future    TSH, 3rd generation; Future

## 2024-12-02 ENCOUNTER — TELEPHONE (OUTPATIENT)
Dept: FAMILY MEDICINE CLINIC | Facility: CLINIC | Age: 79
End: 2024-12-02

## 2024-12-02 NOTE — TELEPHONE ENCOUNTER
----- Message from Anusha MORRIS sent at 11/29/2024  1:58 PM EST -----  LVM to cb for an appt  ----- Message -----  From: Allegra Valladares LPN  Sent: 11/29/2024   1:09 PM EST  To: [facility] Clerical    Patient overdue for his AWV since 9/2024.  Next appointment not scheduled until May 2025. Please call and schedule a sooner appointment for his AWV.

## 2024-12-22 NOTE — PROGRESS NOTES
[facility] DERMATOLOGY  [address] [place] [address]  Renetta Samano [place] [address]  [phone]  [phone]     MRN: [id number] : 1945  Encounter: [id number]  Patient Information: Isaac Landa    Subjective:     69-year-old male presents for planned excision of previously biopsied basal cell carcinoma right lower leg     Objective:   Ht 5' 4" (1 626 m)   Wt 53 5 kg (118 lb)   BMI 20 25 kg/m²     Physical Exam:    General Appearance:    Alert, cooperative, no distress   Skin:   Previous site of biopsy noted    Procedure name: Excision with intermediate layered closure        Location:  Right lower leg    Diagnosis: Basal cell carcinoma    Size of lesion: 14 mm    Margins: 4 mm    Size + Margins: 22 mm    I explained the diagnosis to the patient and we recommend an excision of the lesion for diagnosis and/or treatment  Potential complications include, but are not limited to: scarring, bleeding, infection, incomplete removal, recurrence, and nerve damage  The risks, benefits, and alternatives were discussed with the patient in detail  The location of the tumor was identified, circled, and confirmed by the patient  The correct patient, site, and procedure were confirmed  Anesthesia: 1% xylocaine with 1:100,000 epinephrine 6 cc  The patient was brought into the room, prepped and draped sterilely in the usual manner and anesthesia was administered by local infiltration  A fusiform shape was drawn around the lesion, and the margins were incised to the level of the subcutaneous fat with a number 15cc Bard-Gwyn blade  The tissue was removed with sharp and blunt dissection  The lateral margins of the resulting defect were undermined with sharp and blunt dissection  Hemostasis was achieved with electrocautery  The deeper layers of the defect, including subcutaneous fat and fascia, had to be approximated to reduce tension on the suture line    Layered wound closure was performed  The wound was cleaned with saline, dried off, petroleum applied, and the wound was covered with a pressure dressing  The patient was given detailed verbal and written instructions on postoperative care  Suture for adipose/fascial/dermal layer closure: 4-0  vicryl 4 sutures interrupted    Suture used to approximate epidermis: 4-0  nylon 10sutures interrupted    Final wound length: 4 5 cm    Follow-up in: 2 weeks  Patient tolerated procedure well without complications  Assessment:     1  Basal cell carcinoma (BCC) of right lower leg           Plan:   Wound care instructions given to patient        Prior to Admission medications    Medication Sig Start Date End Date Taking? Authorizing Provider   cyanocobalamin (VITAMIN B-12) 1,000 mcg tablet Take by mouth daily    Historical Provider, MD   ezetimibe (ZETIA) 10 mg tablet TAKE 1 TABLET BY MOUTH EVERY DAY 9/6/22   Serena Goins MD   Fluticasone-Salmeterol (Advair Diskus) 250-50 mcg/dose inhaler Inhale 1 puff 2 (two) times a day Rinse mouth after use  9/14/22   Serena Goins MD   Multiple Vitamin (MULTIVITAMIN) capsule Take 1 capsule by mouth daily    Historical Provider, MD   rosuvastatin (CRESTOR) 10 MG tablet TAKE 1 TABLET BY MOUTH EVERY DAY 9/6/22   Serena Goins MD     No Known Allergies    [facility]  10/26/2022,3:32 PM    Portions of the record may have been created with voice recognition software   Occasional wrong word or "sound a like" substitutions may have occurred due to the inherent limitations of voice recognition software   Read the chart carefully and recognize, using context, where substitutions have occurred  Type 2 myocardial infarction

## 2025-02-22 ENCOUNTER — OFFICE VISIT (OUTPATIENT)
Dept: URGENT CARE | Facility: MEDICAL CENTER | Age: 80
End: 2025-02-22
Payer: MEDICARE

## 2025-02-22 VITALS
TEMPERATURE: 98 F | OXYGEN SATURATION: 98 % | HEART RATE: 90 BPM | RESPIRATION RATE: 18 BRPM | DIASTOLIC BLOOD PRESSURE: 82 MMHG | SYSTOLIC BLOOD PRESSURE: 132 MMHG

## 2025-02-22 DIAGNOSIS — Z23 ENCOUNTER FOR IMMUNIZATION: Primary | ICD-10-CM

## 2025-02-22 DIAGNOSIS — S61.421D: ICD-10-CM

## 2025-02-22 PROCEDURE — 99213 OFFICE O/P EST LOW 20 MIN: CPT | Performed by: FAMILY MEDICINE

## 2025-02-22 PROCEDURE — 90715 TDAP VACCINE 7 YRS/> IM: CPT

## 2025-02-22 PROCEDURE — G0463 HOSPITAL OUTPT CLINIC VISIT: HCPCS | Performed by: FAMILY MEDICINE

## 2025-02-22 NOTE — PROGRESS NOTES
[facility]        NAME: David Grijalva is a 80 y.o. male  : 1945    MRN: [id number]  DATE: 2025  TIME: 6:25 PM    Assessment and Plan   Encounter for immunization [Z23]  1. Encounter for immunization  amoxicillin-clavulanate (AUGMENTIN) 875-125 mg per tablet    Tdap Vaccine greater than or equal to 8yo      2. Laceration with foreign body of right hand, subsequent encounter  Tdap Vaccine greater than or equal to 8yo        Very small abrasions, hemostatic and no s/s of infection  Given prophylactic antibiotics and updated Tdap    Patient Instructions       Follow up with PCP in 3-5 days.  Proceed to  ER if symptoms worsen.    If tests have been performed at [facility], our office will contact you with results if changes need to be made to the care plan discussed with you at the visit.  [facility]    Chief Complaint     Chief Complaint   Patient presents with   • Immunizations     Need for tetanus immunization; last     • Dog Bite     Patient's dog bite right hand; dog up to date with rabies immunizations          History of Present Illness       Dog bite on right hand last night when they tried to remove something from the dog's mouth. Patient's family wrapped with several layers and cleaned with betadine and then they came to  today.  Not up to date on tetanus.  No fever/chills         Review of Systems   Review of Systems   Constitutional:  Negative for activity change, chills, fatigue and fever.   Gastrointestinal:  Negative for diarrhea, nausea and vomiting.         Current Medications       Current Outpatient Medications:   •  amoxicillin-clavulanate (AUGMENTIN) 875-125 mg per tablet, Take 1 tablet by mouth every 12 (twelve) hours for 10 days, Disp: 20 tablet, Rfl: 0  •  albuterol (ProAir HFA) 90 mcg/act inhaler, Inhale 2 puffs every 6 (six) hours as needed for wheezing, Disp: 18 g, Rfl: 1  •  cyanocobalamin (VITAMIN B-12) 1,000  mcg tablet, Take by mouth daily, Disp: , Rfl:   •  ezetimibe (ZETIA) 10 mg tablet, TAKE 1 TABLET BY MOUTH EVERY DAY, Disp: 90 tablet, Rfl: 1  •  fluticasone (FLONASE) 50 mcg/act nasal spray, 2 sprays into each nostril daily, Disp: 48 mL, Rfl: 2  •  Fluticasone-Salmeterol (Advair Diskus) 250-50 mcg/dose inhaler, Inhale 1 puff 2 (two) times a day Rinse mouth after use., Disp: 60 blister, Rfl: 5  •  Multiple Vitamin (MULTIVITAMIN) capsule, Take 1 capsule by mouth daily, Disp: , Rfl:   •  rosuvastatin (CRESTOR) 10 MG tablet, TAKE 1 TABLET BY MOUTH EVERY DAY, Disp: 90 tablet, Rfl: 1    Current Allergies     Allergies as of 02/22/2025   • (No Known Allergies)            The following portions of the patient's history were reviewed and updated as appropriate: allergies, current medications, past family history, past medical history, past social history, past surgical history and problem list.     Past Medical History:   Diagnosis Date   • Asthma    • Malignant neoplasm of skin    • Nonmelanoma skin cancer     Last assessed 6/27/2017        Past Surgical History:   Procedure Laterality Date   • CATARACT EXTRACTION     • EYE SURGERY  2010    Cataracts   • HERNIA REPAIR  2016   • MO RPR 1ST INGUN HRNA AGE 5 YRS/> REDUCIBLE Left 7/21/2016    Procedure: INGUINAL HERNIA REPAIR ;  Surgeon: Michele Cotton MD;  Location: [facility];  Service: General       Family History   Problem Relation Age of Onset   • Coronary artery disease Father    • No Known Problems Sister    • No Known Problems Brother    • Prostate cancer Brother    • No Known Problems Son          Medications have been verified.        Objective   /82   Pulse 90   Temp 98 °F (36.7 °C) (Temporal)   Resp 18   SpO2 98%   No LMP for male patient.       Physical Exam     Physical Exam  Vitals reviewed.   Constitutional:       Appearance: Normal appearance.   HENT:      Head: Normocephalic and atraumatic.   Cardiovascular:      Rate and Rhythm: Normal rate.    Pulmonary:      Effort: Pulmonary effort is normal.   Musculoskeletal:         General: Signs of injury present.      Comments: Small, 2mm abrasion on dorsum of right hand.  No tenderness.  FROM  NVI  No swelling    Skin:     General: Skin is warm and dry.      Capillary Refill: Capillary refill takes less than 2 seconds.   Neurological:      General: No focal deficit present.      Mental Status: He is alert and oriented to person, place, and time.   Psychiatric:         Mood and Affect: Mood normal.

## 2025-03-24 DIAGNOSIS — J45.40 MODERATE PERSISTENT ASTHMA WITHOUT COMPLICATION: ICD-10-CM

## 2025-03-24 DIAGNOSIS — E78.2 MIXED HYPERLIPIDEMIA: ICD-10-CM

## 2025-03-24 NOTE — TELEPHONE ENCOUNTER
Patient's wife calling to request   Requested Prescriptions     Pending Prescriptions Disp Refills    ezetimibe (ZETIA) 10 mg tablet 90 tablet 1     Sig: Take 1 tablet (10 mg total) by mouth daily    rosuvastatin (CRESTOR) 10 MG tablet 90 tablet 1     Sig: Take 1 tablet (10 mg total) by mouth daily    Fluticasone-Salmeterol (Advair Diskus) 250-50 mcg/dose inhaler 60 blister 5     Sig: Inhale 1 puff 2 (two) times a day Rinse mouth after use.     To the [id number] - pt's wife was advised by pharmacy to have new scripts sent over by Dr. España

## 2025-03-25 RX ORDER — ROSUVASTATIN CALCIUM 10 MG/1
10 TABLET, COATED ORAL DAILY
Qty: 90 TABLET | Refills: 1 | Status: SHIPPED | OUTPATIENT
Start: 2025-03-25

## 2025-03-25 RX ORDER — FLUTICASONE PROPIONATE AND SALMETEROL 250; 50 UG/1; UG/1
1 POWDER RESPIRATORY (INHALATION) 2 TIMES DAILY
Qty: 60 BLISTER | Refills: 5 | Status: SHIPPED | OUTPATIENT
Start: 2025-03-25

## 2025-03-25 RX ORDER — EZETIMIBE 10 MG/1
10 TABLET ORAL DAILY
Qty: 90 TABLET | Refills: 1 | Status: SHIPPED | OUTPATIENT
Start: 2025-03-25

## 2025-04-09 ENCOUNTER — OFFICE VISIT (OUTPATIENT)
Age: 80
End: 2025-04-09
Payer: MEDICARE

## 2025-04-09 VITALS
OXYGEN SATURATION: 96 % | BODY MASS INDEX: 24.17 KG/M2 | DIASTOLIC BLOOD PRESSURE: 86 MMHG | HEIGHT: 64 IN | WEIGHT: 141.6 LBS | TEMPERATURE: 97.9 F | HEART RATE: 53 BPM | SYSTOLIC BLOOD PRESSURE: 142 MMHG

## 2025-04-09 DIAGNOSIS — L82.1 SEBORRHEIC KERATOSIS: ICD-10-CM

## 2025-04-09 DIAGNOSIS — D18.01 CHERRY ANGIOMA: ICD-10-CM

## 2025-04-09 DIAGNOSIS — D48.9 NEOPLASM OF UNCERTAIN BEHAVIOR: Primary | ICD-10-CM

## 2025-04-09 DIAGNOSIS — Z85.828 HISTORY OF SKIN CANCER: ICD-10-CM

## 2025-04-09 PROCEDURE — 11102 TANGNTL BX SKIN SINGLE LES: CPT | Performed by: STUDENT IN AN ORGANIZED HEALTH CARE EDUCATION/TRAINING PROGRAM

## 2025-04-09 PROCEDURE — 88305 TISSUE EXAM BY PATHOLOGIST: CPT | Performed by: PATHOLOGY

## 2025-04-09 PROCEDURE — 99214 OFFICE O/P EST MOD 30 MIN: CPT | Performed by: STUDENT IN AN ORGANIZED HEALTH CARE EDUCATION/TRAINING PROGRAM

## 2025-04-09 PROCEDURE — 11103 TANGNTL BX SKIN EA SEP/ADDL: CPT | Performed by: STUDENT IN AN ORGANIZED HEALTH CARE EDUCATION/TRAINING PROGRAM

## 2025-04-09 NOTE — PATIENT INSTRUCTIONS
"MELANOCYTIC NEVI (\"Moles\")  Melanocytic nevi (\"moles\") are tan or brown, raised or flat areas of the skin which have an increased number of melanocytes. Melanocytes are the cells in our body which make pigment and account for skin color.    Some moles are present at birth (I.e., \"congenital nevi\"), while others come up later in life (i.e., \"acquired nevi\").  The sun can stimulate the body to make more moles.  Sunburns are not the only thing that triggers more moles.  Chronic sun exposure can do it too.     Clinically distinguishing a healthy mole from melanoma may be difficult, even for experienced dermatologists. The \"ABCDE's\" of moles have been suggested as a means of helping to alert a person to a suspicious mole and the possible increased risk of melanoma.  The suggestions for raising alert are as follows:    Asymmetry: Healthy moles tend to be symmetric, while melanomas are often asymmetric.  Asymmetry means if you draw a line through the mole, the two halves do not match in color, size, shape, or surface texture. Asymmetry can be a result of rapid enlargement of a mole, the development of a raised area on a previously flat lesion, scaling, ulceration, bleeding or scabbing within the mole.  Any mole that starts to demonstrate \"asymmetry\" should be examined promptly by a board certified dermatologist.     Border: Healthy moles tend to have discrete, even borders.  The border of a melanoma often blends into the normal skin and does not sharply delineate the mole from normal skin.  Any mole that starts to demonstrate \"uneven borders\" should be examined promptly by a board certified dermatologist.     Color: Healthy moles tend to be one color throughout.  Melanomas tend to be made up of different colors ranging from dark black, blue, white, or red.  Any mole that demonstrates a color change should be examined promptly by a board certified dermatologist.     Diameter: Healthy moles tend to be smaller than 0.6 cm in " "size; an exception are \"congenital nevi\" that can be larger.  Melanomas tend to grow and can often be greater than 0.6 cm (1/4 of an inch, or the size of a pencil eraser). This is only a guideline, and many normal moles may be larger than 0.6 cm without being unhealthy.  Any mole that starts to change in size (small to bigger or bigger to smaller) should be examined promptly by a board certified dermatologist.     Evolving: Healthy moles tend to \"stay the same.\"  Melanomas may often show signs of change or evolution such as a change in size, shape, color, or elevation.  Any mole that starts to itch, bleed, crust, burn, hurt, or ulcerate or demonstrate a change or evolution should be examined promptly by a board certified dermatologist.      Dysplastic Nevi  Dysplastic moles are moles that fit the ABCDE rules of melanoma but are not identified as melanomas when examined under the microscope.  They may indicate an increased risk of melanoma in that person. If there is a family history of melanoma, most experts agree that the person may be at an increased risk for developing a melanoma.  Experts still do not agree on what dysplastic moles mean in patients without a personal or family history of melanoma.  Dysplastic moles are usually larger than common moles and have different colors within it with irregular borders. The appearance can be very similar to a melanoma. Biopsies of dysplastic moles may show abnormalities which are different from a regular mole.      Melanoma  Malignant melanoma is a type of skin cancer that can be deadly if it spreads throughout the body. The incidence of melanoma in the [place] is growing faster than any other cancer. Melanoma usually grows near the surface of the skin for a period of time, and then begins to grow deeper into the skin. Once it grows deeper into the skin, the risk of spread to other organs greatly increases. Therefore, early detection and removal of a malignant " "melanoma may result in a better chance at a complete cure; removal after the tumor has spread may not be as effective, leading to worse clinical outcomes such as death.    The true rate of nevus transformation into a melanoma is unknown. It has been estimated that the lifetime risk for any acquired melanocytic nevus on any 20-year-old individual transforming into melanoma by age 80 is 0.03% (1 in 3,164) for men and 0.009% (1 in 10,800) for women.     The appearance of a \"new mole\" remains one of the most reliable methods for identifying a malignant melanoma.  Occasionally, melanomas appear as rapidly growing, blue-black, dome-shaped bumps within a previous mole or previous area of normal skin.  Other times, melanomas are suspected when a mole suddenly appears or changes. Itching, burning, or pain in a pigmented lesion should increase suspicion, but most patients with early melanoma have no skin discomfort whatsoever.  Melanoma can occur anywhere on the skin, including areas that are difficult for self-examination. Many melanomas are first noticed by other family members.  Suspicious-looking moles may be removed for microscopic examination.       You may be able to prevent death from melanoma by doing two simple things:    Try to avoid unnecessary sun exposure and protect your skin when it is exposed to the sun.  People who live near the equator, people who have intermittent exposures to large amounts of sun, and people who have had sunburns in childhood or adolescence have an increased risk for melanoma. Sun sense and vigilant sun protection may be keys to helping to prevent melanoma.  We recommend wearing UPF-rated sun protective clothing and sunglasses whenever possible and applying a moisturizer-sunscreen combination product (SPF 50+) such as Neutrogena Daily Defense to sun exposed areas of skin at least three times a day.    Have your moles regularly examined by a board certified dermatologist AND by yourself or " "a family member/friend at home.  We recommend that you have your moles examined at least once a year by a board certified dermatologist.  Use your birthday as an annual reminder to have your \"Birthday Suit\" (I.e., your skin) examined; it is a nice birthday gift to yourself to know that your skin is healthy appearing!  Additionally, at-home self examinations may be helpful for detecting a possible melanoma.  Use the ABCDEs we discussed and check your moles once a month at home.        SEBORRHEIC KERATOSIS  A seborrheic keratosis is a harmless warty spot that appears during adult life as a common sign of skin aging.  Seborrheic keratoses can arise on any area of skin, covered or uncovered, with the usual exception of the palms and soles. They do not arise from mucous membranes. Seborrheic keratoses can have highly variable appearance.      Seborrheic keratoses are extremely common. It has been estimated that over 90% of adults over the age of 60 years have one or more of them. They occur in males and females of all races, typically beginning to erupt in the 30s or 40s. They are uncommon under the age of 20 years.  The precise cause of seborrhoeic keratoses is not known.  Seborrhoeic keratoses are considered degenerative in nature. As time goes by, seborrheic keratoses tend to become more numerous. Some people inherit a tendency to develop a very large number of them; some people may have hundreds of them.    The name \"seborrheic keratosis\" is misleading, because these lesions are not limited to a seborrhoeic distribution (scalp, mid-face, chest, upper back), nor are they formed from sebaceous glands, nor are they associated with sebum -- which is greasy.  Seborrheic keratosis may also be called \"SK,\" \"Seb K,\" \"basal cell papilloma,\" \"senile wart,\" or \"barnacle.\"      There is no easy way to remove multiple lesions on a single occasion.  Unless a specific lesion is \"inflamed\" and is causing pain or stinging/burning or " "is bleeding, most insurance companies do not authorize treatment.      ANGIOMA (\"CHERRY ANGIOMA\")  Dean angiomas markedly increase in number from about the age of 40, so it has been estimated that 75% of people over 75 years of age have them. Although they also called \"senile angiomas,\" they can occur in young people too - 5% of adolescents have been found to have them.     Cherry angiomas are very common in males and females of any age or race, with no difference in sexes or races affected. They are however more noticeable in white skin than in skin of colour.  There may be a family history of similar lesions. Eruptive (very large number appearing in a short period of time) cherry angiomas have been rarely reported to be associated with internal malignancy and pregnancy.  III. POST-PROCEDURAL CARE (WHAT YOU WILL NEED TO DO \"AFTER THE BIOPSY\" TO OPTIMIZE HEALING)     Keep the area clean and dry.  Try NOT to remove the bandage or get it wet for the first 24 hours.     Gently clean the area and apply surgical ointment (such as Vaseline petrolatum ointment, which is available \"over the counter\" and not a prescription) to the biopsy site for up to 2 weeks straight.  This acts to protect the wound from the outside world.       Sutures are not usually placed in this procedure.  If any sutures were placed, return for suture removal as instructed (generally 1 week for the face, 2 weeks for the body).       Take Acetaminophen (Tylenol) for discomfort, if no contraindications.  Ibuprofen or aspirin could make bleeding worse.     Call our office immediately for signs of infection: fever, chills, increased redness, warmth, tenderness, discomfort/pain, or pus or foul smell coming from the wound.     WHAT TO DO IF THERE IS ANY BLEEDING?  If a small amount of bleeding is noticed, place a clean cloth over the area and apply firm pressure for ten minutes.  Check the wound after 10 minutes of direct pressure.  If bleeding persists, " try one more time for an additional 10 minutes of direct pressure on the area.  If the bleeding becomes heavier or does not stop after the second attempt, or if you have any other questions about this procedure, then please call your [facility] by calling [phone] (SKIN).      I hereby acknowledge that I have reviewed and verified the site with my Dermatologist and have requested and authorized my Dermatologist to proceed with the procedure

## 2025-04-09 NOTE — PROGRESS NOTES
"[facility] Note     Patient Name: David Grijalva  Encounter Date: April 9,2025     Have you been cared for by a [facility] Dermatologist in the last 3 years and, if so, which description applies to you?    Yes.  I have been here within the last 3 years, and my medical history has NOT changed since that time.  I am FEMALE/of child-bearing potential.    REVIEW OF SYSTEMS:  Have you recently had or currently have any of the following? No changes in my recent health.   PAST MEDICAL HISTORY:  Have you personally ever had or currently have any of the following?  If \"YES,\" then please provide more detail. No changes in my medical history.   HISTORY OF IMMUNOSUPPRESSION: Do you have a history of any of the following:  Systemic Immunosuppression such as Diabetes, Biologic or Immunotherapy, Chemotherapy, Organ Transplantation, Bone Marrow Transplantation or Prednisone?  No     Answering \"YES\" requires the addition of the dotphrase \"IMMUNOSUPPRESSED\" as the first diagnosis of the patient's visit.   FAMILY HISTORY:  Any \"first degree relatives\" (parent, brother, sister, or child) with the following?    No changes in my family's known health.   PATIENT EXPERIENCE:    Do you want the Dermatologist to perform a COMPLETE skin exam today including a clinical examination under the \"bra and underwear\" areas?  NO  If necessary, do we have your permission to call and leave a detailed message on your Preferred Phone number that includes your specific medical information?  Yes      No Known Allergies   Current Outpatient Medications:   •  albuterol (ProAir HFA) 90 mcg/act inhaler, Inhale 2 puffs every 6 (six) hours as needed for wheezing, Disp: 18 g, Rfl: 1  •  cyanocobalamin (VITAMIN B-12) 1,000 mcg tablet, Take by mouth daily, Disp: , Rfl:   •  ezetimibe (ZETIA) 10 mg tablet, Take 1 tablet (10 mg total) by mouth daily, Disp: 90 tablet, Rfl: 1  •  fluticasone (FLONASE) 50 mcg/act nasal spray, 2 sprays into each " nostril daily, Disp: 48 mL, Rfl: 2  •  Fluticasone-Salmeterol (Advair Diskus) 250-50 mcg/dose inhaler, Inhale 1 puff 2 (two) times a day Rinse mouth after use., Disp: 60 blister, Rfl: 5  •  Multiple Vitamin (MULTIVITAMIN) capsule, Take 1 capsule by mouth daily, Disp: , Rfl:   •  rosuvastatin (CRESTOR) 10 MG tablet, Take 1 tablet (10 mg total) by mouth daily, Disp: 90 tablet, Rfl: 1          Whom besides the patient is providing clinical information about today's encounter?   NO ADDITIONAL HISTORIAN (patient alone provided history)    Physical Exam and Assessment/Plan by Diagnosis:      HISTORY OF BASAL CELL CARCINOMA     Physical Exam:  Anatomic Location Affected:  Right Calf - 10/2022  Left Shin and Left Back - 2020  Left Forearm Inferior, Right Shin - 2019  Left Hand and Lower Back- 2017  Morphological Description of scar:  well healed  Suspected Recurrence: No  Pertinent Positives:  Pertinent Negatives:     Additional History of Present Condition:  History of basal cell carcinoma with no sign of recurrence     Assessment and Plan:  Based on a thorough discussion of this condition and the management approach to it (including a comprehensive discussion of the known risks, side effects and potential benefits of treatment), the patient (family) agrees to implement the following specific plan:  Monitor for change     HISTORY OF SQUAMOUS CELL CARCINOMA      Physical Exam:  Anatomic Location Affected:  Right Elbow and Right Lower Back - 04/2022  Left Upper Arm, Right Forehead, Right Shin - 2020  Right Dorsum Hand, Right Upper Arm, Left Forearm, Left Forearm Medial - 2019  Mid Back - 2018  Left Elbow and Right Forearm- 2017  Right Forehead, Left Forearm - 2017  Morphological Description of Scar:  well healed  Suspected Recurrence: no  Regional adenopathy: no     Additional History of Present Condition:  History of Squamous Cell   Carcinoma, treated.     Assessment and Plan:  Based on a thorough discussion of this  "condition and the management approach to it (including a comprehensive discussion of the known risks, side effects and potential benefits of treatment), the patient (family) agrees to implement the following specific plan:  Monitor for change    NEOPLASM OF UNCERTAIN BEHAVIOR OF SKIN    Physical Exam:  (Anatomic Location); (Size and Morphological Description); (Differential Diagnosis):  Specimen A: right leg ,2x3cm crusted nodule DDX: Squamous Cell Carcinoma   B: left leg ,1.5cm pearly plaque DDX: BCC vs SCC  Pertinent Positives:  Pertinent Negatives:    Additional History of Present Condition:  present at exam for 6 months    Assessment and Plan:  I have discussed with the patient that a sample of skin via a \"skin biopsy” would be potentially helpful to further make a specific diagnosis under the microscope.  Based on a thorough discussion of this condition and the management approach to it (including a comprehensive discussion of the known risks, side effects and potential benefits of treatment), the patient (family) agrees to implement the following specific plan:    Procedure:  Skin Biopsy.  After a thorough discussion of treatment options and risk/benefits/alternatives (including but not limited to local pain, scarring, dyspigmentation, blistering, possible superinfection, and inability to confirm a diagnosis via histopathology), verbal and written consent were obtained and portion of the rash was biopsied for tissue sample.  See below for consent that was obtained from patient and subsequent Procedure Note.          PROCEDURE TANGENTIAL (SHAVE) BIOPSY NOTE:    Performing Physician:   Anatomic Location; Clinical Description with size (cm); Pre-Op Diagnosis:   Specimen A: right leg ,2x3cm crusted nodule DDX: Squamous Cell Carcinoma   B: left leg ,1.5cm pearly plaque DDX: BCC vs SCC  Post-op diagnosis: Same     Local anesthesia: 1% xylocaine with epi      Topical anesthesia: None    Hemostasis: " "Electrocautery       After obtaining informed consent  at which time there was a discussion about the purpose of biopsy  and low risks of infection and bleeding.  The area was prepped and draped in the usual fashion. Anesthesia was obtained with 1% lidocaine with epinephrine. A shave biopsy to an appropriate sampling depth was obtained by Shave (Dermablade or 15 blade) The resulting wound was covered with surgical ointment and bandaged appropriately.     The patient tolerated the procedure well without complications and was without signs of functional compromise.      Specimen has been sent for review by Dermatopathology.    Standard post-procedure care has been explained and has been included in written form within the patient's copy of Informed Consent.    INFORMED CONSENT DISCUSSION AND POST-OPERATIVE INSTRUCTIONS FOR PATIENT    I.  RATIONALE FOR PROCEDURE  I understand that a skin biopsy allows the Dermatologist to test a lesion or rash under the microscope to obtain a diagnosis.  It usually involves numbing the area with numbing medication and removing a small piece of skin; sometimes the area will be closed with sutures. In this specific procedure, sutures are not usually needed.  If any sutures are placed, then they are usually need to be removed in 2 weeks or less.    I understand that my Dermatologist recommends that a skin \"shave\" biopsy be performed today.  A local anesthetic, similar to the kind that a dentist uses when filling a cavity, will be injected with a very small needle into the skin area to be sampled.  The injected skin and tissue underneath \"will go to sleep” and become numb so no pain should be felt afterwards.  An instrument shaped like a tiny \"razor blade\" (shave biopsy instrument) will be used to cut a small piece of tissue and skin from the area so that a sample of tissue can be taken and examined more closely under the microscope.  A slight amount of bleeding will occur, but it will be " "stopped with direct pressure and a pressure bandage and any other appropriate methods.  I understands that a scar will form where the wound was created.  Surgical ointment will be applied to help protect the wound.  Sutures are not usually needed.    II.  RISKS AND POTENTIAL COMPLICATIONS   I understand the risks and potential complications of a skin biopsy include but are not limited to the following:  Bleeding  Infection  Pain  Scar/keloid  Skin discoloration  Incomplete Removal  Recurrence  Nerve Damage/Numbness/Loss of Function  Allergic Reaction to Anesthesia  Biopsies are diagnostic procedures and based on findings additional treatment or evaluation may be required  Loss or destruction of specimen resulting in no additional findings    My Dermatologist has explained to me the nature of the condition, the nature of the procedure, and the benefits to be reasonably expected compared with alternative approaches.  My Dermatologist has discussed the likelihood of major risks or complications of this procedure including the specific risks listed above, such as bleeding, infection, and scarring/keloid.  I understand that a scar is expected after this procedure.  I understand that my physician cannot predict if the scar will form a \"keloid,\" which extends beyond the borders of the wound that is created.  A keloid is a thick, painful, and bumpy scar.  A keloid can be difficult to treat, as it does not always respond well to therapy, which includes injecting cortisone directly into the keloid every few weeks.  While this usually reduces the pain and size of the scar, it does not eliminate it.      I understand that photographs may be taken before and after the procedure.  These will be maintained as part of the medical providers confidential records and may not be made available to me.  I further authorize the medical provider to use the photographs for teaching purposes or to illustrate scientific papers, books, or " "lectures if in his/her judgment, medical research, education, or science may benefit from its use.    I have had an opportunity to fully inquire about the risks and benefits of this procedure and its alternatives.   I have been given ample time and opportunity to ask questions and to seek a second opinion if I wished to do so.  I acknowledge that there have specifically been no guarantees as to the cosmetic results from the procedure.  I am aware that with any procedure there is always the possibility of an unexpected complication.    III. POST-PROCEDURAL CARE (WHAT YOU WILL NEED TO DO \"AFTER THE BIOPSY\" TO OPTIMIZE HEALING)    Keep the area clean and dry.  Try NOT to remove the bandage or get it wet for the first 24 hours.    Gently clean the area and apply surgical ointment (such as Vaseline petrolatum ointment, which is available \"over the counter\" and not a prescription) to the biopsy site for up to 2 weeks straight.  This acts to protect the wound from the outside world.      Sutures are not usually placed in this procedure.  If any sutures were placed, return for suture removal as instructed (generally 1 week for the face, 2 weeks for the body).      Take Acetaminophen (Tylenol) for discomfort, if no contraindications.  Ibuprofen or aspirin could make bleeding worse.    Call our office immediately for signs of infection: fever, chills, increased redness, warmth, tenderness, discomfort/pain, or pus or foul smell coming from the wound.    WHAT TO DO IF THERE IS ANY BLEEDING?  If a small amount of bleeding is noticed, place a clean cloth over the area and apply firm pressure for ten minutes.  Check the wound after 10 minutes of direct pressure.  If bleeding persists, try one more time for an additional 10 minutes of direct pressure on the area.  If the bleeding becomes heavier or does not stop after the second attempt, or if you have any other questions about this procedure, then please call your [facility] " "Dermatologist by calling [phone] (SKIN).     I hereby acknowledge that I have reviewed and verified the site with my Dermatologist and have requested and authorized my Dermatologist to proceed with the procedure.  SEBORRHEIC KERATOSIS; NON-INFLAMED    Physical Exam:  Anatomic Location Affected:  scattered across trunk, extremities,  face  Morphological Description:  Flat and raised, waxy, smooth to warty textured, yellow to brownish-grey to dark brown to blackish, discrete, \"stuck-on\" appearing papules.  Pertinent Positives:  Pertinent Negatives:    Additional History of Present Condition:  Patient reports new bumps on the skin.  Denies itch, burn, pain, bleeding or ulceration.  Present constantly; nothing seems to make it worse or better.  No prior treatment.      Assessment and Plan:  Based on a thorough discussion of this condition and the management approach to it (including a comprehensive discussion of the known risks, side effects and potential benefits of treatment), the patient (family) agrees to implement the following specific plan:  Reassured benign        ANGIOMA (\"CHERRY ANGIOMA\")    Physical Exam:  Anatomic Location: scattered across sun exposed areas of the trunk and extremities   Morphologic Description: Firm red to reddish-blue discrete papules  Pertinent Positives:  Pertinent Negatives:    Additional History of Present Condition:  Present on exam.     Assessment and Plan:  Reassured benign  LENTIGO    Physical Exam:  Anatomic Location Affected:  back,arms  Morphological Description:  erythematous plaque  Pertinent Positives:  Pertinent Negatives:    Additional History of Present Condition:  present at exam    Assessment and Plan:  Based on a thorough discussion of this condition and the management approach to it (including a comprehensive discussion of the known risks, side effects and potential benefits of treatment), the patient (family) agrees to implement the following specific " plan:    Reassured benign    What is a lentigo?  A lentigo is a pigmented flat or slightly raised lesion with a clearly defined edge. Unlike an ephelis (freckle), it does not fade in the winter months. There are several kinds of lentigo.  The name lentigo originally referred to its appearance resembling a small lentil. The plural of lentigo is lentigines, although “lentigos” is also in common use.    Who gets lentigines?  Lentigines can affect males and females of all ages and races. Solar lentigines are especially prevalent in fair skinned adults. Lentigines associated with syndromes are present at birth or arise during childhood.    What causes lentigines?  Common forms of lentigo are due to exposure to ultraviolet radiation:  Sun damage including sunburn   Indoor tanning   Phototherapy, especially photochemotherapy (PUVA)    Ionizing radiation, eg radiation therapy, can also cause lentigines.  Several familial syndromes associated with widespread lentigines originate from mutations in Silvio-MAP kinase, mTOR signaling and PTEN pathways.    What are the clinical features of lentigines?  Lentigines have been classified into several different types depending on what they look like, where they appear on the body, causative factors, and whether they are associated to other diseases or conditions.  Lentigines may be solitary or more often, multiple. Most lentigines are smaller than 5 mm in diameter.    Lentigo simplex  A precursor to junctional naevus   Arises during childhood and early adult life   Found on trunk and limbs   Small brown round or oval macule or thin plaque   Jagged or smooth edge   May have a dry surface   May disappear in time  Solar lentigo  A precursor to seborrhoeic keratosis   Found on chronically sun exposed sites such as hands, face, lower legs   May also follow sunburn to shoulders   Yellow, light or dark brown regular or irregular macule or thin plaque   May have a dry surface   Often has  moth-eaten outline   Can slowly enlarge to several centimeters in diameter   May disappear, often through the process known as lichenoid keratosis   When atypical in appearance, may be difficult to distinguish from melanoma in situ  Ink spot lentigo  Also known as reticulated lentigo   Few in number compared to solar lentigines   Follows sunburn in very fair skinned individuals   Dark brown to black irregular ink spot-like macule  PUVA lentigo  Similar to ink spot lentigo but follows photochemotherapy (PUVA)   Location anywhere exposed to PUVA  Tanning bed lentigo  Similar to ink spot lentigo but follows indoor tanning   Location anywhere exposed to tanning bed  Radiation lentigo  Occurs in site of irradiation (accidental or therapeutic)   Associated with late-stage radiation dermatitis: epidermal atrophy, subcutaneous fibrosis, keratosis, telangiectasias  Melanotic macule  Mucosal surfaces or adjacent glabrous skin eg lip, vulva, penis, anus   Light to dark brown   Also called mucosal melanosis  Generalised lentigines  Found on any exposed or covered site from early childhood   Small macules may merge to form larger patches   Not associated with a syndrome   Also called lentigines profusa, multiple lentigines  Agminated lentigines  Naevoid eruption of lentigos confined to a single segmental area   Sharp demarcation in midline   May have associated neurological and developmental abnormalities  Patterned lentigines  Inherited tendency to lentigines on face, lips, buttocks, palms, soles   Recognised mainly in people of  ethnicity  Centrofacial neurodysraphic lentiginosis  Associated with mental retardation  Lentiginosis syndromes  Syndromes include LEOPARD/Ariadne, Peutz-Jeghers, Laugier-Hunziker, Moynahan, Xeroderma pigmentosum, myxoma syndromes (WATSON, NAME, Reyes), Ruvalcaba-Myhre-Cleveland, Bannayan-Zonnana syndrome, Cowden disease (multiple hamartoma syndrome )   Inheritance is autosomal dominant; sporadic  cases common   Widespread lentigines present at birth or arise in early childhood   Associated with neural, endocrine, and mesenchymal tumors    How is the diagnosis made?  Lentigines are usually diagnosed clinically by their typical appearance. Concern regarding possibility of melanoma may lead to:  Dermatoscopy   Diagnostic excision biopsy    Histopathology of a lentigo shows:  Thickened epidermis   An increased number of melanocytes along the basal layer of epidermis   Unlike junctional melanocytic naevus, there are no nests of melanocytes   Increased melanin pigment within the keratinocytes   Additional features depending on type of lentigo    In contrast, an ephelis (freckle) shows sun-induced increased melanin within the keratinocytes, without an increase in number of cells.  What is the treatment for lentigines?  Most lentigines are left alone. Attempts to lighten them may not be successful. The following approaches are used:  SPF 50+ broad-spectrum sunscreen   Hydroquinone bleaching cream   Alpha hydroxy acids   Vitamin C   Retinoids   Azelaic acid   Chemical peels  Individual lesions can be permanently removed using:  Cryotherapy   Intense pulsed light   Pigment lasers    How can lentigines be prevented?  Lentigines associated with exposure ultraviolet radiation can be prevented by very careful sun protection. Clothing is more successful at preventing new lentigines than are sunscreens.    What is the outlook for lentigines?  Lentigines usually persist. They may increase in number with age and sun exposure. Some in sun-protected sites may fade and disappear.          Scribe Attestation    I,:  Meaghan Cowan MA am acting as a scribe while in the presence of the attending physician.:       I,:  Thaddeus Cleveland DO personally performed the services described in this documentation    as scribed in my presence.:

## 2025-04-14 PROCEDURE — 88305 TISSUE EXAM BY PATHOLOGIST: CPT | Performed by: PATHOLOGY

## 2025-04-24 ENCOUNTER — DOCUMENTATION (OUTPATIENT)
Dept: ADMINISTRATIVE | Facility: OTHER | Age: 80
End: 2025-04-24

## 2025-04-24 ENCOUNTER — TELEPHONE (OUTPATIENT)
Age: 80
End: 2025-04-24

## 2025-04-24 NOTE — PROGRESS NOTES
04/24/25 2:19 PM    Annual Wellness Visit outreach is not required, patient has an upcoming appointment with the PCP office.    Thank you.  Angela Perdomo MA  [facility]

## 2025-05-19 ENCOUNTER — OFFICE VISIT (OUTPATIENT)
Dept: FAMILY MEDICINE CLINIC | Facility: MEDICAL CENTER | Age: 80
End: 2025-05-19
Payer: MEDICARE

## 2025-05-19 VITALS
DIASTOLIC BLOOD PRESSURE: 70 MMHG | OXYGEN SATURATION: 98 % | TEMPERATURE: 99.2 F | HEIGHT: 64 IN | BODY MASS INDEX: 24.65 KG/M2 | HEART RATE: 60 BPM | WEIGHT: 144.4 LBS | RESPIRATION RATE: 18 BRPM | SYSTOLIC BLOOD PRESSURE: 120 MMHG

## 2025-05-19 DIAGNOSIS — Z00.00 MEDICARE ANNUAL WELLNESS VISIT, SUBSEQUENT: ICD-10-CM

## 2025-05-19 DIAGNOSIS — M79.89 SWELLING OF CALF: ICD-10-CM

## 2025-05-19 DIAGNOSIS — E78.2 MIXED HYPERLIPIDEMIA: Primary | ICD-10-CM

## 2025-05-19 DIAGNOSIS — J45.40 MODERATE PERSISTENT ASTHMA WITHOUT COMPLICATION: ICD-10-CM

## 2025-05-19 DIAGNOSIS — R22.42 LOCALIZED SWELLING, MASS AND LUMP, LEFT LOWER LIMB: ICD-10-CM

## 2025-05-19 DIAGNOSIS — C44.90 SKIN CANCER: ICD-10-CM

## 2025-05-19 PROBLEM — C44.619: Status: ACTIVE | Noted: 2017-10-04

## 2025-05-19 PROBLEM — D04.61: Status: ACTIVE | Noted: 2017-11-14

## 2025-05-19 PROBLEM — C44.629 SQUAMOUS CELL CANCER OF SKIN OF HAND, LEFT: Status: ACTIVE | Noted: 2017-10-04

## 2025-05-19 PROBLEM — C44.519 BASAL CELL CARCINOMA OF BACK: Status: ACTIVE | Noted: 2017-02-20

## 2025-05-19 PROCEDURE — G0439 PPPS, SUBSEQ VISIT: HCPCS | Performed by: INTERNAL MEDICINE

## 2025-05-19 PROCEDURE — 99214 OFFICE O/P EST MOD 30 MIN: CPT | Performed by: INTERNAL MEDICINE

## 2025-05-19 PROCEDURE — G2211 COMPLEX E/M VISIT ADD ON: HCPCS | Performed by: INTERNAL MEDICINE

## 2025-05-19 NOTE — ASSESSMENT & PLAN NOTE
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  Recently had squamous cell cancer removed from the right leg which needs further surgery.

## 2025-05-19 NOTE — ASSESSMENT & PLAN NOTE
Orders:    VAS VENOUS DUPLEX -LOWER LIMB UNILATERAL; Future  Will get back to him with the results of the ultrasound

## 2025-05-19 NOTE — ASSESSMENT & PLAN NOTE
Orders:    CBC and differential; Future    Comprehensive metabolic panel; Future    Lipid panel; Future    TSH, 3rd generation; Future  Was told to continue the Crestor and Zetia

## 2025-05-19 NOTE — PATIENT INSTRUCTIONS
Medicare Preventive Visit Patient Instructions  Thank you for completing your Welcome to Medicare Visit or Medicare Annual Wellness Visit today. Your next wellness visit will be due in one year (5/20/2026).  The screening/preventive services that you may require over the next 5-10 years are detailed below. Some tests may not apply to you based off risk factors and/or age. Screening tests ordered at today's visit but not completed yet may show as past due. Also, please note that scanned in results may not display below.  Preventive Screenings:  Service Recommendations Previous Testing/Comments   Colorectal Cancer Screening  Colonoscopy    Fecal Occult Blood Test (FOBT)/Fecal Immunochemical Test (FIT)  Fecal DNA/Cologuard Test  Flexible Sigmoidoscopy Age: 45-75 years old   Colonoscopy: every 10 years (May be performed more frequently if at higher risk)  OR  FOBT/FIT: every 1 year  OR  Cologuard: every 3 years  OR  Sigmoidoscopy: every 5 years  Screening may be recommended earlier than age 45 if at higher risk for colorectal cancer. Also, an individualized decision between you and your healthcare provider will decide whether screening between the ages of 76-85 would be appropriate. Colonoscopy: Not on file  FOBT/FIT: Not on file  Cologuard: Not on file  Sigmoidoscopy: Not on file          Prostate Cancer Screening Individualized decision between patient and health care provider in men between ages of 55-69   Medicare will cover every 12 months beginning on the day after your 50th birthday PSA: 0.823 ng/mL     Screening Not Indicated     Hepatitis C Screening Once for adults born between 1945 and 1965  More frequently in patients at high risk for Hepatitis C Hep C Antibody: Not on file        Diabetes Screening 1-2 times per year if you're at risk for diabetes or have pre-diabetes Fasting glucose: 81 mg/dL (3/21/2024)  A1C: No results in last 5 years (No results in last 5 years)  Screening Current   Cholesterol  Screening Once every 5 years if you don't have a lipid disorder. May order more often based on risk factors. Lipid panel: 11/05/2024  Screening Not Indicated  History Lipid Disorder      Other Preventive Screenings Covered by Medicare:  Abdominal Aortic Aneurysm (AAA) Screening: covered once if your at risk. You're considered to be at risk if you have a family history of AAA or a male between the age of 65-75 who smoking at least 100 cigarettes in your lifetime.  Lung Cancer Screening: covers low dose CT scan once per year if you meet all of the following conditions: (1) Age 55-77; (2) No signs or symptoms of lung cancer; (3) Current smoker or have quit smoking within the last 15 years; (4) You have a tobacco smoking history of at least 20 pack years (packs per day x number of years you smoked); (5) You get a written order from a healthcare provider.  Glaucoma Screening: covered annually if you're considered high risk: (1) You have diabetes OR (2) Family history of glaucoma OR (3)  aged 50 and older OR (4)  American aged 65 and older  Osteoporosis Screening: covered every 2 years if you meet one of the following conditions: (1) Have a vertebral abnormality; (2) On glucocorticoid therapy for more than 3 months; (3) Have primary hyperparathyroidism; (4) On osteoporosis medications and need to assess response to drug therapy.  HIV Screening: covered annually if you're between the age of 15-65. Also covered annually if you are younger than 15 and older than 65 with risk factors for HIV infection. For pregnant patients, it is covered up to 3 times per pregnancy.    Immunizations:  Immunization Recommendations   Influenza Vaccine Annual influenza vaccination during flu season is recommended for all persons aged >= 6 months who do not have contraindications   Pneumococcal Vaccine   * Pneumococcal conjugate vaccine = PCV13 (Prevnar 13), PCV15 (Vaxneuvance), PCV20 (Prevnar 20)  * Pneumococcal  polysaccharide vaccine = PPSV23 (Pneumovax) Adults 19-65 yo with certain risk factors or if 65+ yo  If never received any pneumonia vaccine: recommend Prevnar 20 (PCV20)  Give PCV20 if previously received 1 dose of PCV13 or PPSV23   Hepatitis B Vaccine 3 dose series if at intermediate or high risk (ex: diabetes, end stage renal disease, liver disease)   Respiratory syncytial virus (RSV) Vaccine - COVERED BY MEDICARE PART D  * RSVPreF3 (Arexvy) CDC recommends that adults 60 years of age and older may receive a single dose of RSV vaccine using shared clinical decision-making (SCDM)   Tetanus (Td) Vaccine - COST NOT COVERED BY MEDICARE PART B Following completion of primary series, a booster dose should be given every 10 years to maintain immunity against tetanus. Td may also be given as tetanus wound prophylaxis.   Tdap Vaccine - COST NOT COVERED BY MEDICARE PART B Recommended at least once for all adults. For pregnant patients, recommended with each pregnancy.   Shingles Vaccine (Shingrix) - COST NOT COVERED BY MEDICARE PART B  2 shot series recommended in those 19 years and older who have or will have weakened immune systems or those 50 years and older     Health Maintenance Due:  There are no preventive care reminders to display for this patient.  Immunizations Due:      Topic Date Due   • Pneumococcal Vaccine: 65+ Years (2 of 2 - PCV) 11/03/2019   • COVID-19 Vaccine (6 - 2024-25 season) 09/01/2024     Advance Directives   What are advance directives?  Advance directives are legal documents that state your wishes and plans for medical care. These plans are made ahead of time in case you lose your ability to make decisions for yourself. Advance directives can apply to any medical decision, such as the treatments you want, and if you want to donate organs.   What are the types of advance directives?  There are many types of advance directives, and each state has rules about how to use them. You may choose a  combination of any of the following:  Living will:  This is a written record of the treatment you want. You can also choose which treatments you do not want, which to limit, and which to stop at a certain time. This includes surgery, medicine, IV fluid, and tube feedings.   Durable power of  for healthcare (DPAHC):  This is a written record that states who you want to make healthcare choices for you when you are unable to make them for yourself. This person, called a proxy, is usually a family member or a friend. You may choose more than 1 proxy.  Do not resuscitate (DNR) order:  A DNR order is used in case your heart stops beating or you stop breathing. It is a request not to have certain forms of treatment, such as CPR. A DNR order may be included in other types of advance directives.  Medical directive:  This covers the care that you want if you are in a coma, near death, or unable to make decisions for yourself. You can list the treatments you want for each condition. Treatment may include pain medicine, surgery, blood transfusions, dialysis, IV or tube feedings, and a ventilator (breathing machine).  Values history:  This document has questions about your views, beliefs, and how you feel and think about life. This information can help others choose the care that you would choose.  Why are advance directives important?  An advance directive helps you control your care. Although spoken wishes may be used, it is better to have your wishes written down. Spoken wishes can be misunderstood, or not followed. Treatments may be given even if you do not want them. An advance directive may make it easier for your family to make difficult choices about your care.       © Copyright [employer] 2018 Information is for End User's use only and may not be sold, redistributed or otherwise used for commercial purposes. All illustrations and images included in CareNotes® are the copyrighted property of [employer] Inc[employer]  or [employer]

## 2025-05-19 NOTE — PROGRESS NOTES
Name: David Grijalva      : 1945      MRN: [id number]  Encounter Provider: Xuan España MD  Encounter Date: 2025   Encounter department: [facility]  :  Assessment & Plan  Mixed hyperlipidemia    Orders:    CBC and differential; Future    Comprehensive metabolic panel; Future    Lipid panel; Future    TSH, 3rd generation; Future  Was told to continue the Crestor and Zetia  Moderate persistent asthma without complication  Continue inhalers as advised       Swelling of calf    Orders:    VAS VENOUS DUPLEX -LOWER LIMB UNILATERAL; Future  Will get back to him with the results of the ultrasound  Skin cancer  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  Recently had squamous cell cancer removed from the right leg which needs further surgery.       Localized swelling, mass and lump, left lower limb    Orders:    VAS VENOUS DUPLEX -LOWER LIMB UNILATERAL; Future    Medicare annual wellness visit, subsequent            Preventive health issues were discussed with patient, and age appropriate screening tests were ordered as noted in patient's After Visit Summary. Personalized health advice and appropriate referrals for health education or preventive services given if needed, as noted in patient's After Visit Summary.    History of Present Illness     HPI  Patient is here for follow-up of hyperlipidemia, asthma and swelling of his left calf for more than a week.  He has been taking his medication for the cholesterol regularly.  Also takes his inhalers and is doing well with his breathing.  His main concern is that he has multiple cancers on his skin recurrently.  His wife also noticed that his left lower leg has been swollen for the last 7 to 10 days.  She is concerned about that.    Patient Care Team:  Xuan España MD as PCP - General (Internal Medicine)  MD Eren Delgado MD Harneet Sethi, MD (Internal Medicine)    Review of Systems   Constitutional:   Negative for chills, diaphoresis, fatigue and fever.   HENT:  Negative for congestion, ear discharge, ear pain, hearing loss, postnasal drip, rhinorrhea, sinus pressure, sinus pain, sneezing, sore throat and voice change.    Eyes:  Negative for pain, discharge, redness and visual disturbance.   Respiratory:  Negative for cough, chest tightness, shortness of breath and wheezing.    Cardiovascular:  Negative for chest pain, palpitations and leg swelling.   Gastrointestinal:  Negative for abdominal distention, abdominal pain, blood in stool, constipation, diarrhea, nausea and vomiting.   Endocrine: Negative for cold intolerance, heat intolerance, polydipsia, polyphagia and polyuria.   Genitourinary:  Negative for dysuria, flank pain, frequency, hematuria and urgency.   Musculoskeletal:  Negative for arthralgias, back pain, gait problem, joint swelling, myalgias, neck pain and neck stiffness.        Complains of left leg swelling   Skin:  Negative for rash.   Neurological:  Negative for dizziness, tremors, syncope, facial asymmetry, speech difficulty, weakness, light-headedness, numbness and headaches.   Hematological:  Does not bruise/bleed easily.   Psychiatric/Behavioral:  Negative for behavioral problems, confusion and sleep disturbance. The patient is not nervous/anxious.      Medical History Reviewed by provider this encounter:  Tobacco  Allergies  Meds  Problems  Med Hx  Surg Hx  Fam Hx       Annual Wellness Visit Questionnaire   David is here for his Subsequent Wellness visit.     Health Risk Assessment:   Patient rates overall health as good. Patient feels that their physical health rating is same. Patient is very satisfied with their life. Eyesight was rated as same. Hearing was rated as same. Patient feels that their emotional and mental health rating is same. Patients states they are never, rarely angry. Patient states they are never, rarely unusually tired/fatigued. Pain experienced in the last 7  days has been none. Patient states that he has experienced no weight loss or gain in last 6 months.     Depression Screening:   PHQ-2 Score: 0      Fall Risk Screening:   In the past year, patient has experienced: no history of falling in past year      Home Safety:  Patient does not have trouble with stairs inside or outside of their home. Patient has working smoke alarms and has working carbon monoxide detector. Home safety hazards include: not having non-slip bath and/or shower mats.     Nutrition:   Current diet is Regular.     Medications:   Patient is currently taking over-the-counter supplements. OTC medications include: see medication list. Patient is not able to manage medications.     Activities of Daily Living (ADLs)/Instrumental Activities of Daily Living (IADLs):   Walk and transfer into and out of bed and chair?: Yes  Dress and groom yourself?: Yes    Bathe or shower yourself?: Yes    Feed yourself? Yes  Do your laundry/housekeeping?: Yes  Manage your money, pay your bills and track your expenses?: Yes  Make your own meals?: Yes    Do your own shopping?: Yes    Previous Hospitalizations:   Any hospitalizations or ED visits within the last 12 months?: No      Advance Care Planning:   Living will: No      Preventive Screenings      Cardiovascular Screening:    General: Screening Not Indicated and History Lipid Disorder      Diabetes Screening:     General: Screening Current      Prostate Cancer Screening:    General: Screening Not Indicated      Abdominal Aortic Aneurysm (AAA) Screening:    Risk factors include: tobacco use        Lung Cancer Screening:     General: Screening Not Indicated    Immunizations:  - Immunizations due: Prevnar 20 and Zoster (Shingrix)    Screening, Brief Intervention, and Referral to Treatment (SBIRT)     Screening  Typical number of drinks in a day: 0  Typical number of drinks in a week: 0  Interpretation: Low risk drinking behavior.    Single Item Drug Screening:  How often  "have you used an illegal drug (including marijuana) or a prescription medication for non-medical reasons in the past year? never    Single Item Drug Screen Score: 0  Interpretation: Negative screen for possible drug use disorder    Social Drivers of Health     Financial Resource Strain: Low Risk  (9/19/2023)    Overall Financial Resource Strain (CARDIA)     Difficulty of Paying Living Expenses: Not very hard   Food Insecurity: No Food Insecurity (5/19/2025)    Nursing - Inadequate Food Risk Classification     Worried About Running Out of Food in the Last Year: Never true     Ran Out of Food in the Last Year: Never true   Transportation Needs: No Transportation Needs (5/19/2025)    PRAPARE - Transportation     Lack of Transportation (Medical): No     Lack of Transportation (Non-Medical): No   Housing Stability: Low Risk  (5/19/2025)    Housing Stability Vital Sign     Unable to Pay for Housing in the Last Year: No     Number of Times Moved in the Last Year: 0     Homeless in the Last Year: No   Utilities: Not At Risk (5/19/2025)    [facility] Utilities     Threatened with loss of utilities: No     No results found.    Objective   /70 (BP Location: Left arm, Patient Position: Sitting, Cuff Size: Large)   Pulse 60   Temp 99.2 °F (37.3 °C) (Temporal)   Resp 18   Ht 5' 4\" (1.626 m)   Wt 65.5 kg (144 lb 6.4 oz)   SpO2 98%   BMI 24.79 kg/m²     Physical Exam  Constitutional:       General: He is not in acute distress.     Appearance: He is well-developed. He is not diaphoretic.   HENT:      Head: Normocephalic and atraumatic.      Right Ear: External ear normal.      Left Ear: External ear normal.      Nose: Nose normal.     Eyes:      General: No scleral icterus.        Right eye: No discharge.         Left eye: No discharge.      Conjunctiva/sclera: Conjunctivae normal.       Cardiovascular:      Rate and Rhythm: Normal rate and regular rhythm.      Heart sounds: Normal heart sounds. No murmur heard.     No " friction rub. No gallop.   Pulmonary:      Effort: Pulmonary effort is normal. No respiratory distress.      Breath sounds: Normal breath sounds. No wheezing or rales.   Abdominal:      General: Bowel sounds are normal. There is no distension.      Palpations: Abdomen is soft.      Tenderness: There is no abdominal tenderness. There is no guarding or rebound.     Musculoskeletal:         General: No tenderness.      Left lower leg: Edema present.     Skin:     General: Skin is warm and dry.      Findings: No erythema or rash.     Neurological:      Mental Status: He is alert and oriented to person, place, and time.      Cranial Nerves: No cranial nerve deficit.      Sensory: No sensory deficit.      Motor: No abnormal muscle tone.     Psychiatric:         Behavior: Behavior normal.

## 2025-05-21 ENCOUNTER — RESULTS FOLLOW-UP (OUTPATIENT)
Dept: INTERNAL MEDICINE CLINIC | Facility: CLINIC | Age: 80
End: 2025-05-21

## 2025-05-21 ENCOUNTER — HOSPITAL ENCOUNTER (OUTPATIENT)
Dept: NON INVASIVE DIAGNOSTICS | Facility: CLINIC | Age: 80
Discharge: HOME | End: 2025-05-21
Attending: INTERNAL MEDICINE
Payer: MEDICARE

## 2025-05-21 ENCOUNTER — TELEPHONE (OUTPATIENT)
Age: 80
End: 2025-05-21

## 2025-05-21 DIAGNOSIS — M79.89 SWELLING OF CALF: ICD-10-CM

## 2025-05-21 DIAGNOSIS — C44.92 SQUAMOUS CELL CARCINOMA OF SKIN: Primary | ICD-10-CM

## 2025-05-21 DIAGNOSIS — R22.42 LOCALIZED SWELLING, MASS AND LUMP, LEFT LOWER LIMB: ICD-10-CM

## 2025-05-21 PROCEDURE — 93971 EXTREMITY STUDY: CPT

## 2025-05-21 PROCEDURE — 93971 EXTREMITY STUDY: CPT | Performed by: SURGERY

## 2025-05-21 NOTE — TELEPHONE ENCOUNTER
Received call from patient's wife Brianne stating that when they went to the family doctor on Monday the left leg was swollen and a Vascular sonogram was ordered.    Patients wife wanted to let Dr Cleveland know that the sonogram appt is for today at 2 pm.    She also wanted to let him know that the area where the biopsy was done on the leg does not look fully healed.    Brianne stated that the afternoon's are usually best to reach them but if it is an unknown number she typically does not answer.    Brianne asked to be called at [phone].

## 2025-05-22 ENCOUNTER — TELEPHONE (OUTPATIENT)
Dept: DERMATOLOGY | Facility: CLINIC | Age: 80
End: 2025-05-22

## 2025-05-22 NOTE — LETTER
David Grijalva     1945    [address] [id number]    Dear David Grijalva,    You are scheduled to have the Mohs procedure on June 4, 2025 at 9 am for right leg with Dr. Seth Valiente. Your surgery will be located in [facility] at [facility] our address is [address] [place] [address]. Once you arrive please check in with our front staff in [address] and then wait Mohs waiting room [address].  If you have someone bringing you to your appointment they may wait in the waiting room or accompany you in your visit.     Below you will find some pre-op instructions along with some information regarding the Mohs procedure.     If you have any questions please call our office at [phone].     Thank you,    [facility] Department         PRE-OPERATIVE INSTRUCTIONS - MOHS    Before your scheduled surgery, there are a number of important precautions and positive steps you should take to help prepare yourself for a successful treatment and speedy recovery.    Some of the steps, which are listed below, may seem unnecessary and inconvenient, but they are important. For example, when you stop smoking, you increase your ability to heal. Occasionally, there may be valid reasons, personal or medical, why you can't comply. In such cases, please call the office so we can discuss possible ways to overcome any obstacles you may be encountering.    If you have any questions about the surgery, or remember additional medical information that you forgot to mention to our staff, please contact the office prior to your surgery.    GENERAL INFORMATION REGARDING MOHS MICROGRAPHIC SURGERY    Mohs surgery is a specialized technique for the removal of skin cancer developed by Dr. Frederick Mohs over 50 years ago to improve the cure rates of skin cancer. Traditionally, skin cancers are treated by destructive methods (radiation, freezing, scraping, and burning) or  excision (cutting out the tissue with standards margins and sending it to an outside laboratory for testing). These methods all yield cure rates between 65%-94%. However, for cancers located in cosmetically sensitive areas, large tumors, or tumors unsuccessfully treated by other means, Mohs surgery offers a higher cure rate. In most cases, Mohs surgery provides you with a 99% cure rate for primary (previously untreated) basal cell cancer and a 95% cure rate for primary squamous cell cancer. In Mohs surgery, tissue is removed and processed in a way that we are able to check 100% of the margins, giving the highest cure rate for any method of treating skin cancers while providing maximal preservation of normal skin. This allows the surgeon to produce an optimal cosmetic result for the patient by maximizing the amount of tissue removed yielding as small a scar as possible    On the day of surgery, you will be given local anesthesia only (similar to what was given to you during your initial biopsy). You will remain awake. You will verify the location of the skin cancer prior to the onset of the surgery. Once the area is numb, the tissue containing the skin cancer will be removed, taking a small safety margin. This margin is usually smaller than what would be taken with a standard excision. Once the tissue is removed, it is marked and oriented. The first layer (“Stage I”) will be processed in our laboratory. The wound will be treated for bleeding and a bandage will be placed to keep you comfortable while you wait an approximate 45 minutes-1 hour (for the processing of the tissue) in your room. Your Mohs surgeon will examine the pathology in the lab, checking all the margins. If any tumor remains, you will need to take a second layer of skin (“Stage 2”). The area will be re-anesthetized and your Mohs surgeon will remove more skin only in the area where the tumor exists. This process will continue until all the skin cancer  is removed. Unfortunately, there is no method to predict how many layers or stages will be taken.    Once the tumor has been removed completely, we will discuss the best ways to close the defect. Most wounds may be closed with stitches. A larger wound may require a skin graft or a flap. In rare instances, especially for cancers around the eye or for larger cancers, we may work with another surgeon (oculoplastic, ENT, plastics) with special skills to assist with reconstruction.  If the surgery is coordinated with another specialist, you will have the Mohs portion of the procedure first and see the coordinated specialist after the skin cancer has been removed.  Always follow the pre-operative instructions of the surgeon doing the closure.    Medications: Please take all your normal medications the morning of your surgery. If you are a diabetic, please bring your insulin or medications with you, as well as a snack to avoid having low blood sugar during your day with us.    1) Blood Thinners  VERY IMPORTANT: We do NOT stop or hold prescribed blood thinners (such as Coumadin/Warfarin, Plavix, Eliquis, Pradaxa, Brilinta, Apixaban, Xarelto, Lovonox, Rivaroxaban, or Aggrenox) before Mohs surgery. Additionally, If you take aspirin because you have had a stroke, heart attack, heart disease, other condition, or your physician has prescribed you to take it, please continue your aspirin.    Although there is a risk of increased bruising and bleeding, we are still able to safely perform surgery while continuing these medications. Please NEVER stop your prescribed blood thinner without the managing doctor's (the doctor that prescribed the medication) permission or knowledge. If you have any concerns about not holding your blood thinner, please address these with your Mohs surgeon.    Most people should stop all non-steroidal anti-inflammatory medications (Motrin, Naproxen, Advil, Midol, Aleve, etc.) for 7 days prior to your  scheduled surgery and 2 days after (unless instructed otherwise after surgery).  You may take Tylenol for pain.     Vitamins and Supplements  Avoid taking any supplements with Vitamin E, Fish Oil, Gingko, Ginseng, and Garlic for 2 weeks before and 2 days after your surgery. These thin your blood.    Lidocaine Patches  Avoid wearing any over the counter or prescribed Lidocaine patches the day of the surgery.    Alcohol: Avoid drinking alcohol for 2 days prior to your surgery, and for 2 days afterwards (it thins the blood and causes more bruising and swelling).    Smoking: Try to STOP or reduce smoking significantly the week before your surgery, and especially the week afterwards (it greatly improves how well you heal). Tobacco smoke deprives the blood of oxygen, which is urgently needed by the wound during the healing process.    Contact Lenses: Do not wear them on the day of the surgery. Instead, wear glasses and bring your case, in case we need to remove them.    Clothing: Do not wear your nicest clothing on your surgery day. We recommend wearing a button down shirt that will not disrupt your post-operative dressing when changing later that night. Please avoid wearing jeans during the procedure to help prevent damage to our equipment.     Bathing: On the morning of your surgery, you may bathe or shower normally. If you get your hair done on a weekly basis, remember to get your hair washed the day before surgery.   - You will need to keep your surgical site dry for a minimum of 48 hours after surgery.    Makeup: If your surgery is on the face, please do not wear any makeup on the day of the surgery.    Jewelry: Please try to avoid wearing jewelry on the day of surgery.    Food: On the morning of surgery, have breakfast but limit your intake of caffeinated beverages. They are diuretic and may inconvenience you during surgery. If you are following up with another surgeon the same day as your Mohs surgery, you must  receive permission to eat breakfast from that surgeon.    What to bring with you on the day of your surgery:  Bring snacks - Since you could be at the office long, you may bring snacks and/or lunch with you. Some snacks and drinks are available at the office as well.   Bring a sweater - Bring a sweater or jacket that buttons or zips down the front and will not disturb your wound dressing during removal.  Bring something to do - You will be spending much of the day in our office. There will be 45-60 minute waiting periods  between layers/stages, and while there is a television with cable in every room, it is nice to have something to keep you occupied such as books, magazines, knitting, music, or work.     Planning Ahead:  Appointment Length - Understand this procedure length is unpredictable, therefore, plan to be in the office for at least half a day. Depending on procedures scheduled prior to yours, there may be waiting prior to the start of your procedure.  Other Appointments - It is important to realize that no matter how small the skin cancer appears to be, looks can be deceiving. Since your surgery may last the entire day, you should not schedule any other appointments that day.  Special Occasions - Surgery often creates swelling and bruising. Also, the post-op dressing may be rather large and obvious. Keep this in mind as you arrange your social/work schedule. If an important event is already planned, please check with your referring physician or your Mohs surgeon to see if the surgery can be postponed.  Activity Limits after Surgery - If surgery was performed on your face, we recommend that you keep your activity level to a minimum for 2-3 days (the blood pressure elevation related to exercise can lead to bleeding). If you have stitches in an area that will be under tension or significant movement (neck, back, arms, legs), you will need to avoid heavy lifting (anything over 5 lbs) or exercise for at least 2  weeks and possibly longer. We also advise that you limit out of town travel for the first 7 days after surgery. You should also wait at least 7 days before going into a pool or the ocean.  Housework - Since you will need to minimize activity after surgery, plan to do your groceries, laundry, gardening, and other heavy household chores prior to your surgery. Please make arrangements for assistance during the post-op period. If surgery is around your mouth area, you may need to eat soft foods, such as soup, milkshakes, or yogurt for 48 hours.    Purchasing bandage supplies: Prior to surgery, please purchase the following items to care for your surgical wound properly.  Cotton swabs (Q-tips)  Vaseline or Aquaphor  Telfa pads (or any non-stick dressing)  Paper tape or Hypafix tape  Gauze pads (3x3)    Transportation: It is often reassuring and comforting to have a  drive you to and from the surgery. He or she is welcome to wait in the office during the surgery. If you do not have a , you may drive to and from your procedure (unless stated otherwise). If the site being treated is near your eye, be aware that the final bandage may cause some obstruction of vision.     Rescheduling: If you need to reschedule your surgery, please notify the office as soon as possible.

## 2025-05-22 NOTE — TELEPHONE ENCOUNTER
Pre- operative Mohs Telephone Scheduling Note    Do you have a pacemaker or defibrillator? no    Do you have a cochlear implant, spinal or brain stimulator? no    Do you take antibiotics before skin or dental procedures? no  If yes, will likely require pre-operative antibiotics. Ask  the patient why they take the antibiotics (usually because of joint replacement).    Do you have a history of a joint replacements within the past 2 years? no   If yes, will likely require pre-operative antibiotics. Ask if orthopaedic surgeon has prescribed pre-operative antibiotics to take before procedures/dental work?    Do you take any OTC medications that thin your blood (Aspirin, Aleve, Ibuprofen) or supplements that thin your blood (fish oil, garlic, vitamin E, Ginko Biloba)? no    Do you take any prescribed medications that thin your blood (Coumadin, Plavix, Xarelto, Eliquis or another prescribed blood thinner)? no    Do you have an allergy to lidocaine or epinephrine? yes: lidocaine possibly, makes him squirm per wife     Do you have allergies to Iodine? no    Do you wear a lidocaine patch? no    Have you ever been diagnosed with HIV, AIDS, Hep B and Hep C? no    Do you use a cane, walker or wheelchair? no    Is the patient from a nursing home? no If yes, Is there any special accommodations that is needed for patient n/a    Do you smoke? no      If yes,  patient to try and stop 2 days before surgery and 7 days after the surgery. Minimizing smoking as much as possible during this time will improve healing and the cosmetic result after surgery.    Do you use supplemental oxygen? If so, how many liters and can you be off it for a short period of time? N/a    Date scheduled: 6/4/25 at 9 with Dr Valiente    Coordination of Care with other provider (Oculoplastics, Plastics, ENT) required? no   IF YES, PLEASE FORWARD TO APPROPRIATE PERSONNEL TO HELP COORDINATE.    Are there remaining tumors to be scheduled? no    Was Prior  Authorization obtained? No (please use [email or web address] to document prior auth)

## 2025-06-04 ENCOUNTER — PROCEDURE VISIT (OUTPATIENT)
Dept: DERMATOLOGY | Facility: CLINIC | Age: 80
End: 2025-06-04
Payer: MEDICARE

## 2025-06-04 VITALS
OXYGEN SATURATION: 98 % | WEIGHT: 141.8 LBS | TEMPERATURE: 98.6 F | HEIGHT: 64 IN | BODY MASS INDEX: 24.21 KG/M2 | SYSTOLIC BLOOD PRESSURE: 132 MMHG | DIASTOLIC BLOOD PRESSURE: 82 MMHG | HEART RATE: 64 BPM

## 2025-06-04 DIAGNOSIS — C44.722 SQUAMOUS CELL CARCINOMA OF LEG, RIGHT: Primary | ICD-10-CM

## 2025-06-04 PROCEDURE — 12032 INTMD RPR S/A/T/EXT 2.6-7.5: CPT | Performed by: DERMATOLOGY

## 2025-06-04 PROCEDURE — MOHS PR NO CHARGE MOHS PROCEDURE: Performed by: DERMATOLOGY

## 2025-06-04 PROCEDURE — 17313 MOHS 1 STAGE T/A/L: CPT | Performed by: DERMATOLOGY

## 2025-06-04 NOTE — PROGRESS NOTES
Mohs Procedure Note    Patient: David Grijalva  : 1945  MRN: [id number]  Date: 2025    History of Present Illness: The patient is a 80 y.o. 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     Plan: Mohs    Mohs Procedure Timeout      Flowsheet Row Most Recent Value   Timeout: 932   Patient Identity Verified: Yes   Correct Site Verified: Yes   Correct Procedure Verified: Yes            Mohs Diagnosis/Indication/Location/ID      Flowsheet Row Most Recent Value   Pathology Type Squamous cell carcinoma   Anatomic Site right leg   Indications for Mohs tumor location   Mohs ID [id number]            Mohs Site/Accession/Pre-Post      Flowsheet Row Most Recent Value   Original Site Identified (as submitted by referring clinician) Photo, Referral   Biopsy Accession/Specimen # (as submitted by referring clincian) [id number]   Pre-Mohs Size Length (cm) 1.5   Pre-Mohs Size Width (cm): 1.2   Post-Mohs Size-Length (cm) 1.5   Post Mohs Size-Width (cm) 1.4   Repair Type Intermediate layered closure   Suture Type Vicryl, Prolene   Prolene Suture Size 4   Vicryl Suture Size 4   Final repair length (cm): 3   Anesthetic Used 1% Lidocaine with epinephrine  [10 mL]              Mohs Tumor Stage 1 Information      Flowsheet Row Most Recent Value   Tissue Sections (blocks) 2   Microscopic Exam Section 1: No tumor identified in section.   Microscopic Exam Section 2: No tumor identified in section.   Tumor Clear After Stage I? Yes                      Patient identified, procedure verified, site identified and verified. Time out completed. Surgical removal of the  lesion discussed with the patient (risks and benefits, including possibility of scarring, infection, recurrence or potential for further treatment).    I have specifically identified the site with the patient. I have discussed the fact that the patient will have a scar after the procedure regardless of granulation or repair with sutures. I have discussed that the repair options can range from granulation in some cases to linear or curvilinear closures to larger flaps or grafts.  There are sometimes flaps or grafts used that require multiples stages of surgery and will not be completed today, rather be completed over a series of appointments. I have discussed that occasionally due to location, size or depth of the lesion I may recommend consultation with and transfer of care for further removal or the reconstruction to another provider such as ophthalmology surgery, plastic surgery, ENT surgery, or surgical oncology. There are cases in which other testing such as imaging with MRI or CT scan or testing of lymph nodes is recommended because of the nature/depth/location of tumor seen during the removal. There is a risk of injury to nerves causing temporary or permanent numbness or the inability to move muscles full such as the inability to lift eyebrows. Questions answered and verbal and written consent was obtained.    With the patient in the supine position and under adequate local anesthesia with 1% lidocaine with epinephrine 1:100,000, the defect was scrubbed with Chlorhexidine. Sterile drapes were placed from the sterile tray.  Because of the location of the surgical defect, an intermediate closure was judged to give the best possible cosmetic and functional result.  The edges of the defect were carefully debrided removing any dead or coagulated tissue.      Hemostasis was obtained by pinpoint electrocoagulation.  Careful planning of removal of redundant tissue at either end of the defect was drawn out so that the  suture lines would fall in the optimal orientation with regard to the relaxed skin tension lines.  These were then removed with a #15 blade scalpel.  The wound was then approximated by a deep layer of buried vertical mattress sutures and the cutaneous margins were approximated and closed by superficial sutures as noted above.  Estimated blood loss was less than 5 mL.      The patient tolerated the procedure well.  The wound was dressed with petrolatum, a non-stick pad, and a compression dressing.     Seth Valiente MD served as the surgeon and pathologist during the procedure.    Postoperative care: Wound care discussed at length.  I urged the patient to call us if any problems or question should arise.     Complications: none  Post-op medications: none  Patient condition after procedure: stable  Discharge plans: Plan for return to us for suture removal, as scheduled in 14 days.     SCC cleared with 1 stage of mohs and repaired with 3cm closure.  Well tolerated.  S/R in 2 weeks.    Scribe Attestation      I,:  Deysi Bentley MA am acting as a scribe while in the presence of the attending physician.:       I,:  Seth Valiente MD personally performed the services described in this documentation    as scribed in my presence.:                  [1]   Past Medical History:  Diagnosis Date    Asthma     Malignant neoplasm of skin     Nonmelanoma skin cancer     Last assessed 6/27/2017     Squamous cell skin cancer 04/09/2025    right leg, mohs   [2]   Past Surgical History:  Procedure Laterality Date    CATARACT EXTRACTION      EYE SURGERY  2010    Cataracts    HERNIA REPAIR  2016    MOHS SURGERY  06/04/2025    SCC right leg, Dr Valiente    [facility] RPR 1ST INGUN HRNA AGE 5 YRS/> REDUCIBLE Left 07/21/2016    Procedure: INGUINAL HERNIA REPAIR ;  Surgeon: Michele Cotton MD;  Location: [facility];  Service: General   [3]   Current Outpatient Medications:     albuterol (ProAir HFA) 90 mcg/act inhaler, Inhale 2 puffs every 6  (six) hours as needed for wheezing, Disp: 18 g, Rfl: 1    cyanocobalamin (VITAMIN B-12) 1,000 mcg tablet, Take by mouth in the morning., Disp: , Rfl:     ezetimibe (ZETIA) 10 mg tablet, Take 1 tablet (10 mg total) by mouth daily, Disp: 90 tablet, Rfl: 1    fluticasone (FLONASE) 50 mcg/act nasal spray, 2 sprays into each nostril daily, Disp: 48 mL, Rfl: 2    Fluticasone-Salmeterol (Advair Diskus) 250-50 mcg/dose inhaler, Inhale 1 puff 2 (two) times a day Rinse mouth after use., Disp: 60 blister, Rfl: 5    Multiple Vitamin (MULTIVITAMIN) capsule, Take 1 capsule by mouth in the morning., Disp: , Rfl:     rosuvastatin (CRESTOR) 10 MG tablet, Take 1 tablet (10 mg total) by mouth daily, Disp: 90 tablet, Rfl: 1  [4] No Known Allergies

## 2025-06-04 NOTE — PATIENT INSTRUCTIONS
"[facility] After Care    WOUND CARE AFTER SURGERY:    Do NOT to remove the pressure bandage for 48 hours. Keep the area clean and dry while this bandage is on.    After removing the bandage for the first time, gently clean the area with soap and water. If the bandage is difficult to remove, getting the bandage wet in the shower will sometimes help soften the adhesive and allow it to be removed more easily.     You will now need to cleanse this area daily in the shower with gentle soap. There is no need to scrub the area. Apply plain Vaseline ointment (this is over the counter and not a prescription) to the site followed by a clean appropriately sized bandage to area.  Non stick dressing and paper tape (or Hypafix) are recommended for sensitive skin but a bandaid is fine if it covers the area well.    You will need to continue the above daily wound care until you return for suture removal in 10-14 days (generally 7 days for the face, 10-14 days off the face)      RESTRICTIONS:     For two DAYS:   - You will need to take it very easy as this time is highest risk for bleeding. Being a \"couch potato\" during these two days is generally recommended.   - For surgeries on the face/neck/scalp: Avoid leaning down to pick things up off the floor as this brings blood up to your head. Instead, squat down to pick things up.     For two WEEKS:   - No heavy lifting (anything greater than 10 pounds)   - You can start to do slow, gentle activities such as slow walking but nothing to increase your heart rate and blood pressure too much (such as cardiovascular exercise). It is important to take it easy as there is still a risk for bleeding and a high risk popping of stitches open during this time.     MANAGING YOUR PAIN AFTER SURGERY     You can expect to have some pain after surgery. This is normal. The pain is typically worse the first two days after surgery, and quickly begins to get better.     The best strategy for " controlling your pain after surgery is around the clock pain control. You can take over the counter Acetaminophen (Tylenol) for discomfort, if no contraindications.     If you are taking this at the maximum dose, you can alternate this with Motrin (ibuprofen or Advil) as well. Alternating these medications with each other allows you to maximize your pain control. In addition to Tylenol and Motrin, you can use heating pads or ice packs on your incisions to help reduce your pain.     How will I alternate your regular strength over-the-counter pain medication?  You will take a dose of pain medication every three hours.   Start by taking 650 mg of Tylenol (2 pills of 325 mg)   3 hours later take 600 mg of Motrin (3 pills of 200 mg)   3 hours after taking the Motrin take 650 mg of Tylenol   3 hours after that take 600 mg of Motrin.    See example - if your first dose of Tylenol is at 12:00 PM     12:00 PM  Tylenol 650 mg (2 pills of 325 mg)    3:00 PM  Motrin 600 mg (3 pills of 200 mg)    6:00 PM  Tylenol 650 mg (2 pills of 325 mg)    9:00 PM  Motrin 600 mg (3 pills of 200 mg)    Continue alternating every 3 hours      Important:   Do not take more than 4000mg of Tylenol or 3200mg of Motrin in a 24-hour period.     What if I still have pain?   If you have pain that is not controlled with the over-the-counter pain medications (Tylenol and Motrin or Advil), don't hesitate to call our staff using the number provided. We will help make sure you are managing your pain in the best way possible, and if necessary, we can provide a prescription for additional pain medication.     CALL OUR OFFICE IMMEDIATELY FOR ANY SIGNS OF INFECTION:    This includes fever, chills, increased redness, warmth, tenderness, severe discomfort/pain, or pus or foul smell coming from the wound. If you are experiencing any of the above, please call [facility] directly at [phone].    IF BLEEDING IS NOTICED:    Place a clean cloth  over the area and apply firm pressure for thirty minutes.  Check the wound ONLY after 30 minutes of direct pressure; do not cheat and sneak a peak, as that does not count.  If bleeding persists after 30 minutes of legitimate direct pressure, then try one more round of direct pressure to the area.  Should the bleeding become heavier or not stop after the second attempt, call [facility] directly at [phone]. Your call will get routed to the dermatology surgeon on call even after hours.

## 2025-06-11 ENCOUNTER — TELEPHONE (OUTPATIENT)
Age: 80
End: 2025-06-11

## 2025-06-11 NOTE — TELEPHONE ENCOUNTER
[facility]  [phone] NEED TO RESCHD  SURGAPPT  OFFERED  8/4/25 @ 9:00  PT NEEDS TO CONF  PLEASE CONTACT   ELDA WHEN PT CONF  APPT

## 2025-06-17 ENCOUNTER — OFFICE VISIT (OUTPATIENT)
Dept: DERMATOLOGY | Facility: CLINIC | Age: 80
End: 2025-06-17

## 2025-06-17 DIAGNOSIS — Z48.02 ENCOUNTER FOR REMOVAL OF SUTURES: Primary | ICD-10-CM

## 2025-06-17 PROCEDURE — 99024 POSTOP FOLLOW-UP VISIT: CPT | Performed by: STUDENT IN AN ORGANIZED HEALTH CARE EDUCATION/TRAINING PROGRAM

## 2025-06-17 NOTE — PROGRESS NOTES
"Suture removal    Date/Time: 6/17/2025 1:30 PM    Performed by: Lauryn Bernardo RN  Authorized by: Archana Wayne MD    Universal Protocol:  procedure performed by consultantConsent: Verbal consent obtained. Written consent not obtained  Risks and benefits: risks, benefits and alternatives were discussed  Consent given by: patient  Time out: Immediately prior to procedure a \"time out\" was called to verify the correct patient, procedure, equipment, support staff and site/side marked as required.  Timeout called at: 6/17/2025 1:29 PM.  Patient understanding: patient states understanding of the procedure being performed  Patient consent: the patient's understanding of the procedure matches consent given  Procedure consent: procedure consent matches procedure scheduled  Relevant documents: relevant documents not present or verified  Test results: test results not available  Site marked: the operative site was not marked  Radiology Images displayed and confirmed. If images not available, report reviewed: imaging studies not available  Patient identity confirmed: verbally with patient      Patient location:  Clinic  Location:     Laterality:  Right    Location:  Lower extremity    Lower extremity location:  Leg  Procedure details:     Tools used:  Suture removal kit    Wound appearance:  No sign(s) of infection, good wound healing, clean, moist and pink  Post-procedure details:     Post-procedure assessment: Non-adherent dressing and Vaseline applied.    Patient tolerance of procedure:  Tolerated well, no immediate complications  Comments:      Patient advised to use Vaseline and a band aid for 1-2 more weeks to aid in the wound healing process. Patient educated on signs of infection to monitor for such as increased pain, redness, drainage, fever or chills.        Before suture removal     After suture removal     "

## 2025-08-04 ENCOUNTER — PROCEDURE VISIT (OUTPATIENT)
Age: 80
End: 2025-08-04
Payer: MEDICARE

## 2025-08-04 VITALS
DIASTOLIC BLOOD PRESSURE: 74 MMHG | OXYGEN SATURATION: 99 % | HEART RATE: 78 BPM | TEMPERATURE: 98.2 F | BODY MASS INDEX: 24.07 KG/M2 | WEIGHT: 141 LBS | SYSTOLIC BLOOD PRESSURE: 122 MMHG | HEIGHT: 64 IN

## 2025-08-04 DIAGNOSIS — C44.91 BASAL CELL CARCINOMA (BCC), UNSPECIFIED SITE: Primary | ICD-10-CM

## 2025-08-04 PROCEDURE — 17262 DSTRJ MAL LES T/A/L 1.1-2.0: CPT | Performed by: STUDENT IN AN ORGANIZED HEALTH CARE EDUCATION/TRAINING PROGRAM

## 2025-08-04 PROCEDURE — 17000 DESTRUCT PREMALG LESION: CPT | Performed by: STUDENT IN AN ORGANIZED HEALTH CARE EDUCATION/TRAINING PROGRAM

## 2025-08-14 ENCOUNTER — APPOINTMENT (OUTPATIENT)
Dept: URGENT CARE | Facility: MEDICAL CENTER | Age: 80
End: 2025-08-14
Payer: MEDICARE

## 2025-08-14 ENCOUNTER — OFFICE VISIT (OUTPATIENT)
Dept: URGENT CARE | Facility: MEDICAL CENTER | Age: 80
End: 2025-08-14
Payer: MEDICARE

## 2025-08-15 ENCOUNTER — TELEPHONE (OUTPATIENT)
Age: 80
End: 2025-08-15

## 2025-08-19 ENCOUNTER — OFFICE VISIT (OUTPATIENT)
Age: 80
End: 2025-08-19

## 2025-08-19 VITALS
BODY MASS INDEX: 24.07 KG/M2 | WEIGHT: 141 LBS | SYSTOLIC BLOOD PRESSURE: 126 MMHG | HEART RATE: 72 BPM | TEMPERATURE: 98.2 F | HEIGHT: 64 IN | OXYGEN SATURATION: 97 % | DIASTOLIC BLOOD PRESSURE: 60 MMHG | RESPIRATION RATE: 16 BRPM

## 2025-08-19 DIAGNOSIS — Z48.89 ENCOUNTER FOR POST SURGICAL WOUND CHECK: Primary | ICD-10-CM

## 2025-08-20 ENCOUNTER — OFFICE VISIT (OUTPATIENT)
Dept: FAMILY MEDICINE CLINIC | Facility: MEDICAL CENTER | Age: 80
End: 2025-08-20
Payer: MEDICARE

## 2025-08-20 VITALS
DIASTOLIC BLOOD PRESSURE: 70 MMHG | RESPIRATION RATE: 18 BRPM | OXYGEN SATURATION: 96 % | TEMPERATURE: 98.7 F | HEART RATE: 57 BPM | SYSTOLIC BLOOD PRESSURE: 112 MMHG | BODY MASS INDEX: 26.5 KG/M2 | WEIGHT: 155.2 LBS | HEIGHT: 64 IN

## 2025-08-20 DIAGNOSIS — R60.0 BILATERAL EDEMA OF LOWER EXTREMITY: Primary | ICD-10-CM

## 2025-08-20 DIAGNOSIS — L03.90 WOUND CELLULITIS: ICD-10-CM

## 2025-08-20 PROCEDURE — 99213 OFFICE O/P EST LOW 20 MIN: CPT | Performed by: INTERNAL MEDICINE

## 2025-08-20 PROCEDURE — G2211 COMPLEX E/M VISIT ADD ON: HCPCS | Performed by: INTERNAL MEDICINE

## 2025-08-20 RX ORDER — FUROSEMIDE 20 MG/1
20 TABLET ORAL DAILY
Qty: 30 TABLET | Refills: 5 | Status: SHIPPED | OUTPATIENT
Start: 2025-08-20